# Patient Record
Sex: FEMALE | Race: WHITE | NOT HISPANIC OR LATINO | Employment: OTHER | URBAN - NONMETROPOLITAN AREA
[De-identification: names, ages, dates, MRNs, and addresses within clinical notes are randomized per-mention and may not be internally consistent; named-entity substitution may affect disease eponyms.]

---

## 2017-01-20 DIAGNOSIS — M25.512 LEFT SHOULDER PAIN, UNSPECIFIED CHRONICITY: Primary | ICD-10-CM

## 2017-01-23 ENCOUNTER — OFFICE VISIT (OUTPATIENT)
Dept: ORTHOPEDIC SURGERY | Facility: CLINIC | Age: 72
End: 2017-01-23

## 2017-01-23 ENCOUNTER — HOSPITAL ENCOUNTER (OUTPATIENT)
Dept: GENERAL RADIOLOGY | Facility: HOSPITAL | Age: 72
Discharge: HOME | End: 2017-01-23
Attending: ORTHOPAEDIC SURGERY | Admitting: ORTHOPAEDIC SURGERY

## 2017-01-23 VITALS
WEIGHT: 143 LBS | BODY MASS INDEX: 25.34 KG/M2 | HEIGHT: 63 IN | SYSTOLIC BLOOD PRESSURE: 122 MMHG | DIASTOLIC BLOOD PRESSURE: 66 MMHG | HEART RATE: 77 BPM

## 2017-01-23 DIAGNOSIS — G89.29 CHRONIC LEFT SHOULDER PAIN: Primary | ICD-10-CM

## 2017-01-23 DIAGNOSIS — M25.512 CHRONIC LEFT SHOULDER PAIN: Primary | ICD-10-CM

## 2017-01-23 DIAGNOSIS — M25.512 LEFT SHOULDER PAIN, UNSPECIFIED CHRONICITY: ICD-10-CM

## 2017-01-23 PROCEDURE — 73030 X-RAY EXAM OF SHOULDER: CPT | Performed by: RADIOLOGY

## 2017-01-23 PROCEDURE — 99203 OFFICE O/P NEW LOW 30 MIN: CPT | Performed by: ORTHOPAEDIC SURGERY

## 2017-01-23 PROCEDURE — 20610 DRAIN/INJ JOINT/BURSA W/O US: CPT | Performed by: ORTHOPAEDIC SURGERY

## 2017-01-23 PROCEDURE — 73030 X-RAY EXAM OF SHOULDER: CPT

## 2017-01-23 RX ORDER — ESOMEPRAZOLE MAGNESIUM 40 MG/1
CAPSULE, DELAYED RELEASE ORAL
Refills: 3 | COMMUNITY
Start: 2017-01-09 | End: 2017-02-16

## 2017-01-23 RX ORDER — BUPIVACAINE HYDROCHLORIDE 5 MG/ML
5 INJECTION, SOLUTION EPIDURAL; INTRACAUDAL
Status: COMPLETED | OUTPATIENT
Start: 2017-01-23 | End: 2017-01-23

## 2017-01-23 RX ORDER — METHYLPREDNISOLONE ACETATE 40 MG/ML
80 INJECTION, SUSPENSION INTRA-ARTICULAR; INTRALESIONAL; INTRAMUSCULAR; SOFT TISSUE
Status: COMPLETED | OUTPATIENT
Start: 2017-01-23 | End: 2017-01-23

## 2017-01-23 RX ORDER — ROSUVASTATIN CALCIUM 40 MG/1
40 TABLET, COATED ORAL NIGHTLY
Refills: 5 | COMMUNITY
Start: 2017-01-10 | End: 2021-02-04 | Stop reason: SDUPTHER

## 2017-01-23 RX ADMIN — METHYLPREDNISOLONE ACETATE 80 MG: 40 INJECTION, SUSPENSION INTRA-ARTICULAR; INTRALESIONAL; INTRAMUSCULAR; SOFT TISSUE at 09:31

## 2017-01-23 RX ADMIN — BUPIVACAINE HYDROCHLORIDE 5 ML: 5 INJECTION, SOLUTION EPIDURAL; INTRACAUDAL at 09:31

## 2017-01-23 NOTE — MR AVS SNAPSHOT
Sirisha Valdez   1/23/2017 8:00 AM   Office Visit    Dept Phone:  [phone]   Encounter #:  [id number]    Provider:  Tesfaye Polo MD   Department:  [facility]                Your Full Care Plan              Your Updated Medication List          This list is accurate as of: 1/23/17  8:55 AM.  Always use your most recent med list.                aspirin 81 MG EC tablet       cilostazol 50 MG tablet   Commonly known as:  PLETAL   Take 1 tablet by mouth 2 (Two) Times a Day.       diazePAM 10 MG tablet   Commonly known as:  VALIUM       esomeprazole 40 MG capsule   Commonly known as:  nexIUM       HYDROcodone-acetaminophen 7.5-325 MG per tablet   Commonly known as:  NORCO       lisinopril 2.5 MG tablet   Commonly known as:  PRINIVIL,ZESTRIL   Take 1 tablet by mouth Daily.       metoprolol tartrate 50 MG tablet   Commonly known as:  LOPRESSOR       * nicotine 21 MG/24HR patch   Commonly known as:  NICODERM CQ   Place 1 patch on the skin Daily.       * nicotine 14 MG/24HR patch   Commonly known as:  NICODERM CQ   Place 1 patch on the skin Daily.       * nicotine 7 MG/24HR patch   Commonly known as:  NICODERM CQ   Place 1 patch on the skin Daily.       pregabalin 75 MG capsule   Commonly known as:  LYRICA       * rosuvastatin 10 MG tablet   Commonly known as:  CRESTOR       * rosuvastatin 40 MG tablet   Commonly known as:  CRESTOR       * Notice:  This list has 5 medication(s) that are the same as other medications prescribed for you. Read the directions carefully, and ask your doctor or other care provider to review them with you.            Instructions     None    Patient Instructions History      Upcoming Appointments     Visit Type Date Time Department    NEW PATIENT 1/23/2017  8:00 AM [facility]    XR COR SHOULDER 2+ VW L 1/23/2017  8:15 AM [facility] XRAY [facility]    FOLLOW UP 3/6/2017  8:00 AM [facility]      MyChart Signup     Our  "records indicate that your [facility] [employer] account has been deactivated. If you would like to reactivate your account, please email [email or web address] or call [phone] to talk to our [employer] staff.             Other Info from Your Visit           Your Appointments     Mar 06, 2017  8:00 AM EST   Follow Up with Tesfaye Polo MD   [facility] (--)    [address] [place] [address] [place] [id number] [phone]           Arrive 15 minutes prior to appointment.              Allergies     Promethazine      hypotension      Reason for Visit     Left Shoulder - Pain     Shoulder Pain Patient presents today for left shoulder pain      Vital Signs     Blood Pressure Pulse Height Weight Body Mass Index Smoking Status    122/66 77 63\" (160 cm) 143 lb (64.9 kg) 25.33 kg/m2 Current Every Day Smoker        "

## 2017-01-23 NOTE — PROGRESS NOTES
"  Patient: Sirisha Valdez    YOB: 1945        History of Present Illness:   Patient is a 71-year-old white female who presents for evaluation of her left shoulder pain.  Right hand dominant  Relates it to a accident health fall in August of this year she fell out on her outstretched arm.  States it hurts all the time.  Hurts at night when she tries to lay on it.  Is unable to lift her arm overhead.  Has had history of a right rotator cuff repair done in the distal past.  Denies any paresthesias or swelling in her hand or wrist.  States that sometimes her shoulder swells and feels like a popping sensation in it.  Does take hydrocodone 4 times a day along with Lyrica and Valium for other pain problems prior to this injury    Positive smoking history  Review of Systems:    Pertinent positives evaluated and listed in HPI, others systems completed by patient and reviewed today.         Physical Exam: 71 y.o. female  General Appearance:    Alert and oriented x 3, cooperative, in no acute distress                   Vitals:    01/23/17 0853   BP: 122/66   Pulse: 77   Weight: 143 lb (64.9 kg)   Height: 63\" (160 cm)          Normal weight white female in no obvious acute distress.  Skin is intact.  No obvious bruising or ecchymosis or warmth noted.  She can forward flex to about 100° and abduct about the same with discomfort.  At her side she has full internal rotation and about 45° of external rotation without significant discomfort.  She has good internal/external rotation strength.  Does have pain and weakness with stressing the supraspinatus.  Left hand grossly neurovascularly intact without swelling.      Radiology:     X-rays are essentially normal without significant arthritis or evidence of acute injury      Large Joint Arthrocentesis  Date/Time: 1/23/2017 9:31 AM  Consent given by: patient  Site marked: site marked  Supporting Documentation  Indications: pain and diagnostic evaluation   Procedure " Details  Location: shoulder - L subacromial bursa  Needle size: 25 G  Approach: lateral  Medications administered: 80 mg methylPREDNISolone acetate 40 MG/ML; 5 mL bupivacaine (PF TEARTOP) 0.5 %  Patient tolerance: patient tolerated the procedure well with no immediate complications                Assessment/Plan:  Left shoulder pain likely secondary to rotator cuff injury.  We'll try one subacromial injection with some steroid and Marcaine and physical therapy to see how she responds.  We'll see her back in 5-6 weeks if no improvement we'll consider further evaluation with MRI        Discussion/Summary:    This chart was completed utilizing the dragon speech recognition software.  Grammatical errors, random word insertions, pronoun errors, and incomplete sentences or occasional consequences of the system due to software limitations, ambient noise, and hardware issues.  Any questions or concerns about the content, text, or information contained within the body of this dictation should be directly addressed to the physician for clarification        This document was signed by Tesfaye Polo M.D. January 23, 2017 9:27 AM

## 2017-02-16 ENCOUNTER — OFFICE VISIT (OUTPATIENT)
Dept: CARDIOLOGY | Facility: CLINIC | Age: 72
End: 2017-02-16

## 2017-02-16 VITALS
WEIGHT: 145 LBS | DIASTOLIC BLOOD PRESSURE: 70 MMHG | HEART RATE: 81 BPM | HEIGHT: 63 IN | BODY MASS INDEX: 25.69 KG/M2 | SYSTOLIC BLOOD PRESSURE: 118 MMHG

## 2017-02-16 DIAGNOSIS — I10 ESSENTIAL HYPERTENSION: ICD-10-CM

## 2017-02-16 DIAGNOSIS — I73.9 PVD (PERIPHERAL VASCULAR DISEASE) (HCC): Primary | ICD-10-CM

## 2017-02-16 DIAGNOSIS — I49.5 SSS (SICK SINUS SYNDROME) (HCC): ICD-10-CM

## 2017-02-16 DIAGNOSIS — R00.2 PALPITATIONS: ICD-10-CM

## 2017-02-16 DIAGNOSIS — I25.10 ARTERIOSCLEROTIC CARDIOVASCULAR DISEASE (ASCVD): ICD-10-CM

## 2017-02-16 PROCEDURE — 93000 ELECTROCARDIOGRAM COMPLETE: CPT | Performed by: INTERNAL MEDICINE

## 2017-02-16 PROCEDURE — 99213 OFFICE O/P EST LOW 20 MIN: CPT | Performed by: INTERNAL MEDICINE

## 2017-02-16 RX ORDER — BENZONATATE 200 MG/1
200 CAPSULE ORAL 3 TIMES DAILY PRN
COMMUNITY
End: 2017-12-11

## 2017-02-16 RX ORDER — CILOSTAZOL 50 MG/1
50 TABLET ORAL 2 TIMES DAILY
Qty: 60 TABLET | Refills: 6 | Status: SHIPPED | OUTPATIENT
Start: 2017-02-16 | End: 2018-03-01 | Stop reason: SDUPTHER

## 2017-02-16 NOTE — PROGRESS NOTES
Yasmine Reynolds MD  Sirisha Valdez  1945      Patient Active Problem List   Diagnosis   • Arteriosclerotic cardiovascular disease (ASCVD), s/p stenting in 1999, after a myocardial infarction.    • SSS (sick sinus syndrome), s/p permanent pacemaker implantion,    • Palpitations   • Dizziness   • Dyslipidemia, currently on statin   • Essential hypertension, well-contolled.    • PVD (peripheral vascular disease)   • Swelling of both lower extremities       Dear Yasmine Reynolds MD:    Tom Valdez is a 71 y.o. female with the above medical problems who is here today for follow-up.  He states she is doing well.  For some reason she has stopped taking her cilostazol which was apparently is helping her significantly with for claudication.  We will need to reordered today.  She also states she was unable to tolerate the nicotine patches due to blistering at the site of patch but she has cut down to half a pack per day.  She denies any chest pain, shortness breath, palpitations, orthopnea, PND or lower extremity edema.       Current Outpatient Prescriptions:   •  aspirin 81 MG EC tablet, Take 81 mg by mouth daily., Disp: , Rfl:   •  benzonatate (TESSALON) 200 MG capsule, Take 200 mg by mouth 3 (Three) Times a Day As Needed for cough., Disp: , Rfl:   •  diazepam (VALIUM) 10 MG tablet, Take 10 mg by mouth 2 (two) times a day., Disp: , Rfl:   •  HYDROcodone-acetaminophen (NORCO) 7.5-325 MG per tablet, Take 1 tablet by mouth every 4 (four) hours as needed for moderate pain (4-6)., Disp: , Rfl:   •  lisinopril (PRINIVIL,ZESTRIL) 2.5 MG tablet, Take 1 tablet by mouth Daily., Disp: 30 tablet, Rfl: 11  •  metoprolol tartrate (LOPRESSOR) 50 MG tablet, Take 50 mg by mouth 2 (two) times a day., Disp: , Rfl:   •  rosuvastatin (CRESTOR) 40 MG tablet, , Disp: , Rfl: 5  •  cilostazol (PLETAL) 50 MG tablet, Take 1 tablet by mouth 2 (Two) Times a Day., Disp: 60 tablet, Rfl: 6    The following portions of  "the patient's history were reviewed and updated as appropriate: allergies, current medications, past family history, past medical history, past social history, past surgical history and problem list.    Review of Systems   Constitution: Negative for chills, decreased appetite, fever and weakness.   HENT: Positive for sore throat. Negative for ear discharge and ear pain.    Eyes: Negative for pain and redness.   Cardiovascular: Positive for claudication. Negative for chest pain, leg swelling, orthopnea, palpitations, paroxysmal nocturnal dyspnea and syncope.   Respiratory: Negative for cough, shortness of breath and wheezing.    Endocrine: Negative for cold intolerance and heat intolerance.   Hematologic/Lymphatic: Bruises/bleeds easily.   Skin: Negative for dry skin, itching and rash.   Musculoskeletal: Negative for arthritis, back pain, joint pain, neck pain and stiffness.   Gastrointestinal: Negative for abdominal pain, diarrhea, nausea and vomiting.   Genitourinary: Negative for dysuria and hematuria.   Neurological: Negative for dizziness, light-headedness and seizures.   Psychiatric/Behavioral: Negative for depression. The patient is not nervous/anxious.        Objective   Blood pressure 118/70, pulse 81, height 63\" (160 cm), weight 145 lb (65.8 kg).    Physical Exam   Constitutional: She is oriented to person, place, and time. She appears well-developed and well-nourished.   White female sitting comfortably on chair.   HENT:   Mouth/Throat: Oropharynx is clear and moist.   Eyes: EOM are normal. Pupils are equal, round, and reactive to light.   Neck: Neck supple. No JVD present. No tracheal deviation present. No thyromegaly present.   Cardiovascular: Normal rate, regular rhythm, S1 normal and S2 normal.  Exam reveals no gallop and no friction rub.    No murmur heard.  Pulmonary/Chest: Effort normal and breath sounds normal. No respiratory distress. She has no wheezes. She has no rales. She exhibits no " tenderness.   Abdominal: Soft. Bowel sounds are normal. She exhibits no mass. There is no tenderness.   Musculoskeletal: Normal range of motion. She exhibits no edema.   Lymphadenopathy:     She has no cervical adenopathy.   Neurological: She is alert and oriented to person, place, and time.   Skin: Skin is warm and dry. No rash noted.   Psychiatric: She has a normal mood and affect.         ECG 12 Lead  Date/Time: 2/16/2017 11:32 AM  Performed by: PAM ROY  Authorized by: PAM ROY   Comparison: compared with previous ECG from 10/13/2016  Similar to previous ECG  Rhythm: sinus rhythm  Rate: normal  BPM: 80  Conduction: conduction normal  ST Segments: ST segments normal  T depression: II, III and aVF  QRS axis: left and normal  Q waves: II, III and aVF  Clinical impression: non-specific ECG and low voltage            Assessment/Plan     1.  Peripheral vascular disease: Patient with history of peripheral vascular disease status post femorofemoral bypass as well as bilateral lower extremity sent placement.  venous ultrasound of lower extremity did not show any evidence of DVT.  ABIs done during last visit showed decreased circulation to right lower extremity with triphasic flow.  He was reportedly doing well on cilostazol but the patient has discontinued this medication for an unclear reason.  At this point will reorder.  I have also advised her of the importance of tobacco cessation.    2.  Coronary artery disease: Patient with history of coronary artery disease currently on aspirin,beta blocker and statin.  At this point we'll continue current regimen.    3.  Palpitations: Patient with history of palpitations which are currently resolved.  Will continue to monitor.      4.  Sick sinus syndrome: Patient with history of sick sinus syndrome status post pacemaker placement.  She currently remains in sinus rhythm.    5.  Hypertension: Patient is a history of hypertension  currently well controlled on current regimen.  Need to monitor closely a statin losartan.  Will obtain CMP.    6.  Dyslipidemia: Patient with history of dyslipidemia.  Lipid panel showed adequate lipid control.  At this point we'll continue to monitor.    7. Tobacco abuse: Sensation was unable to use patches due to blistering at the site of the patch.  She is going to try nicotine gum.  He has cut down to half a pack per day.  Class her the importance of tobacco cessation.     Diagnosis Plan   1. PVD (peripheral vascular disease)     2. Arteriosclerotic cardiovascular disease (ASCVD), s/p stenting in 1999, after a myocardial infarction.      3. SSS (sick sinus syndrome), s/p permanent pacemaker implantion,      4. Palpitations     5. Essential hypertension, well-contolled.             Return in about 3 months (around 5/16/2017).    I appreciate the opportunity to participate in this patient's cardiovascular care.    Best Regards    Jose F Be

## 2017-02-20 ENCOUNTER — OFFICE VISIT (OUTPATIENT)
Dept: ORTHOPEDIC SURGERY | Facility: CLINIC | Age: 72
End: 2017-02-20

## 2017-02-20 ENCOUNTER — TELEPHONE (OUTPATIENT)
Dept: CARDIOLOGY | Facility: CLINIC | Age: 72
End: 2017-02-20

## 2017-02-20 VITALS — HEIGHT: 63 IN | WEIGHT: 145 LBS | BODY MASS INDEX: 25.69 KG/M2

## 2017-02-20 DIAGNOSIS — G89.29 CHRONIC LEFT SHOULDER PAIN: Primary | ICD-10-CM

## 2017-02-20 DIAGNOSIS — M25.512 CHRONIC LEFT SHOULDER PAIN: Primary | ICD-10-CM

## 2017-02-20 PROCEDURE — 99214 OFFICE O/P EST MOD 30 MIN: CPT | Performed by: ORTHOPAEDIC SURGERY

## 2017-02-20 NOTE — PROGRESS NOTES
"Patient: Sirisha Valdez    YOB: 1945        History of Present Illness:     Patient presents back after the subacromial injection.  States it helped for approximately 1 week.  Continues with chronic complaint of pain weakness popping and catching in the shoulder.  She relates this to a fall in August approximate 6 months ago.  Went to a couple sessions of physical therapy but she said it just, aggravated and made it worse.  Denies any peripheral paresthesias or swelling.  States she had her right shoulder rotator cuff repair done in the past.  Unable to do an MRI secondary to pacemaker.  States she has been told not to take any type of dye    Positive smoking history      Physical Exam: 71 y.o. female  General Appearance:    Alert and oriented x 3, cooperative, in no acute distress                   Vitals:    02/20/17 0951   Weight: 145 lb (65.8 kg)   Height: 63\" (160 cm)          Exam is essentially unchanged.  Notable weakness with stressing the supraspinatus.  Can forward flex to about 80° abduct about the same.  40° of external rotation or side.  Has internal rotation about the SI joint      Radiology:       Previous x-rays performed on January 23 are unremarkable      Assessment/Plan:  Chronic left shoulder pain and weakness status post fall approximately 6 months ago.  Discussed situation with the patient.  Unable to do MRI secondary to pacemaker.  At this point we discussed proceeding with a diagnostic arthroscopy, acromioplasty and probable rotator cuff repair.  We would plan do this as an outpatient.  She would need medical clearance including her cardiologist.  We discussed the detrimental effects of her age and smoking history.  Discussed the risk including infection, bleeding, prolonged rehabilitation, failure to relieve all her symptoms, failure of healing, local nerve damage etc.        Discussion/Summary:        This chart was completed utilizing the dragon speech recognition " software.  Grammatical errors, random word insertions, pronoun errors, and incomplete sentences or occasional consequences of the system due to software limitations, ambient noise, and hardware issues.  Any questions or concerns about the content, text, or information contained within the body of this dictation should be directly addressed to the physician for clarification        This document was signed by Tesfaye Polo M.D. February 20, 2017 10:01 AM

## 2017-02-20 NOTE — H&P
History and Physical      Patient: Sirisha Valdez  YOB: 1945  Date of Encounter: 02/20/2017      HPI:    Patient is a 71-year-old white female who presents for follow up evaluation after subacromial injection. This helped 1 week in regards to her left shoulder pain. Right hand dominant . She relates this to a fall in August approximate 6 months ago. Went to a couple sessions of physical therapy but she said it just, aggravated and made it worse. Denies any peripheral paresthesias or swelling. States she had her right shoulder rotator cuff repair done in the past. Unable to do an MRI secondary to pacemaker. States she has been told not to take any type of dye Does take hydrocodone 4 times a day along with Lyrica and Valium for other pain problems prior to this injury      Past Medical History   Diagnosis Date   • ACD (adult celiac disease)    • Gastroesophageal reflux    • Peripheral vascular disease    • SSS (sick sinus syndrome)    • Status post placement of cardiac pacemaker        Past Surgical History   Procedure Laterality Date   • Shoulder surgery     • Hip surgery     • Carpal tunnel release     • Breast surgery       Mass Removal.   • Hysterectomy     • Pacemaker implantation     • Cataract extraction     • Cardiac catheterization         Family History   Problem Relation Age of Onset   • Heart attack Father    • Heart disease Father    • Heart attack Brother        Social History     Social History   • Marital status:      Spouse name: N/A   • Number of children: N/A   • Years of education: N/A     Occupational History   • Not on file.     Social History Main Topics   • Smoking status: Current Every Day Smoker     Packs/day: 1.00     Types: Cigarettes     Start date: 1961   • Smokeless tobacco: Never Used   • Alcohol use No   • Drug use: No   • Sexual activity: Not on file     Other Topics Concern   • Not on file     Social History Narrative       Current Outpatient Prescriptions  "  Medication Sig Dispense Refill   • aspirin 81 MG EC tablet Take 81 mg by mouth daily.     • benzonatate (TESSALON) 200 MG capsule Take 200 mg by mouth 3 (Three) Times a Day As Needed for cough.     • cilostazol (PLETAL) 50 MG tablet Take 1 tablet by mouth 2 (Two) Times a Day. 60 tablet 6   • diazepam (VALIUM) 10 MG tablet Take 10 mg by mouth 2 (two) times a day.     • HYDROcodone-acetaminophen (NORCO) 7.5-325 MG per tablet Take 1 tablet by mouth every 4 (four) hours as needed for moderate pain (4-6).     • lisinopril (PRINIVIL,ZESTRIL) 2.5 MG tablet Take 1 tablet by mouth Daily. 30 tablet 11   • metoprolol tartrate (LOPRESSOR) 50 MG tablet Take 50 mg by mouth 2 (two) times a day.     • rosuvastatin (CRESTOR) 40 MG tablet   5     No current facility-administered medications for this visit.        Allergies   Allergen Reactions   • Promethazine      hypotension       Review of Systems   Constitution: Negative.   HENT: Negative.    Eyes: Negative.    Cardiovascular: Negative.    Respiratory: Negative.    Endocrine: Negative.    Hematologic/Lymphatic: Negative.    Skin: Negative.    Musculoskeletal:        Pertinent positives listed in HPI   Gastrointestinal: Negative.    Genitourinary: Negative.    Neurological: Negative.    Psychiatric/Behavioral: Negative.    Allergic/Immunologic: Negative.          Vitals:    02/20/17 0951   Weight: 145 lb (65.8 kg)   Height: 63\" (160 cm)             Physical Exam   Constitutional: She is oriented to person, place, and time. She appears well-developed and well-nourished. No distress.   HENT:   Head: Normocephalic and atraumatic.   Nose: Nose normal.   Mouth/Throat: Oropharynx is clear and moist.   Eyes: Conjunctivae and EOM are normal. Pupils are equal, round, and reactive to light. No scleral icterus.   Neck: Normal range of motion. Neck supple. No JVD present. No tracheal deviation present. No thyromegaly present.   Cardiovascular: Normal rate, regular rhythm, normal heart " sounds and intact distal pulses.  Exam reveals no gallop and no friction rub.    No murmur heard.  Pulmonary/Chest: Effort normal. No respiratory distress. She has wheezes. She has no rales. She exhibits no tenderness.   Abdominal: Soft. Bowel sounds are normal.   Musculoskeletal:   Left Shoulder exam reveals no obvious bruising or ecchymosis or warmth noted. Notable weakness with stressing the supraspinatus. Can forward flex to about 80° abduct about the same. 40° of external rotation or side. Has internal rotation about the SI joint. Does have pain and weakness with stressing the supraspinatus. Left hand grossly neurovascularly intact without swelling.   Lymphadenopathy:     She has no cervical adenopathy.   Neurological: She is alert and oriented to person, place, and time.   Skin: Skin is warm and dry. No erythema.   Psychiatric: She has a normal mood and affect.   Nursing note and vitals reviewed.      Radiology/Labs:          Previous x-rays performed on January 23 are unremarkable    Procedures          Assessment:    ICD-10-CM ICD-9-CM   1. Chronic left shoulder pain M25.512 719.41    G89.29 338.29         Plan:    Chronic left shoulder pain and weakness status post fall approximately 6 months ago. Discussed situation with the patient. Unable to do MRI secondary to pacemaker. At this point we discussed proceeding with a diagnostic arthroscopy, acromioplasty and probable rotator cuff repair. We would plan do this as an outpatient. She would need medical clearance including her cardiologist. We discussed the detrimental effects of her age and smoking history. Discussed the risk including infection, bleeding, prolonged rehabilitation, failure to relieve all her symptoms, failure of healing, local nerve damage etc.           Discussion/Summary:           This chart was completed utilizing the dragon speech recognition software. Grammatical errors, random word insertions, pronoun errors, and incomplete sentences  or occasional consequences of the system due to software limitations, ambient noise, and hardware issues. Any questions or concerns about the content, text, or information contained within the body of this dictation should be directly addressed to the physician for clarification      This document was signed by Tesfaye Polo M.D. February 20, 2017 10:21 AM

## 2017-02-20 NOTE — TELEPHONE ENCOUNTER
----- Message from Monica Martinez sent at 2/20/2017 12:47 PM EST -----  Regarding: surgery clearance  Contact: [phone]  Dr. Polo is requesting Surgery clearance for a left shoulder surgery, we just seen her on the 16th do we need to see her back or can we clear her?

## 2017-03-01 ENCOUNTER — TELEPHONE (OUTPATIENT)
Dept: ORTHOPEDIC SURGERY | Facility: CLINIC | Age: 72
End: 2017-03-01

## 2017-03-01 NOTE — TELEPHONE ENCOUNTER
Patient called, she had gone to PCP Dr. Reynolds on Monday 02/27/17 for medical clearance, she ask if we had received it as yet. I told her no, but we have received a clearance from her cardiologist Dr. Be. She said to call her once we receive it.     Phone number [phone]

## 2017-03-07 ENCOUNTER — APPOINTMENT (OUTPATIENT)
Dept: PREADMISSION TESTING | Facility: HOSPITAL | Age: 72
End: 2017-03-07

## 2017-03-08 ENCOUNTER — APPOINTMENT (OUTPATIENT)
Dept: GENERAL RADIOLOGY | Facility: HOSPITAL | Age: 72
End: 2017-03-08
Attending: ORTHOPAEDIC SURGERY

## 2017-03-08 ENCOUNTER — ANESTHESIA EVENT (OUTPATIENT)
Dept: PERIOP | Facility: HOSPITAL | Age: 72
End: 2017-03-08

## 2017-03-08 ENCOUNTER — HOSPITAL ENCOUNTER (OUTPATIENT)
Facility: HOSPITAL | Age: 72
Discharge: HOME | End: 2017-03-08
Attending: ORTHOPAEDIC SURGERY | Admitting: ORTHOPAEDIC SURGERY

## 2017-03-08 ENCOUNTER — ANESTHESIA (OUTPATIENT)
Dept: PERIOP | Facility: HOSPITAL | Age: 72
End: 2017-03-08

## 2017-03-08 VITALS
BODY MASS INDEX: 25.69 KG/M2 | RESPIRATION RATE: 18 BRPM | WEIGHT: 145 LBS | HEIGHT: 63 IN | SYSTOLIC BLOOD PRESSURE: 125 MMHG | TEMPERATURE: 97.3 F | HEART RATE: 87 BPM | DIASTOLIC BLOOD PRESSURE: 69 MMHG | OXYGEN SATURATION: 95 %

## 2017-03-08 DIAGNOSIS — G89.29 CHRONIC LEFT SHOULDER PAIN: ICD-10-CM

## 2017-03-08 DIAGNOSIS — M25.512 CHRONIC LEFT SHOULDER PAIN: ICD-10-CM

## 2017-03-08 PROCEDURE — 25010000002 DEXAMETHASONE PER 1 MG: Performed by: NURSE ANESTHETIST, CERTIFIED REGISTERED

## 2017-03-08 PROCEDURE — 29822 SHO ARTHRS SRG LMTD DBRDMT: CPT | Performed by: ORTHOPAEDIC SURGERY

## 2017-03-08 PROCEDURE — 29826 SHO ARTHRS SRG DECOMPRESSION: CPT | Performed by: ORTHOPAEDIC SURGERY

## 2017-03-08 PROCEDURE — 25010000002 MIDAZOLAM PER 1 MG: Performed by: ANESTHESIOLOGY

## 2017-03-08 PROCEDURE — 25010000003 CEFAZOLIN PER 500 MG: Performed by: ORTHOPAEDIC SURGERY

## 2017-03-08 PROCEDURE — 25010000002 PROPOFOL 10 MG/ML EMULSION: Performed by: NURSE ANESTHETIST, CERTIFIED REGISTERED

## 2017-03-08 PROCEDURE — 25010000002 FENTANYL CITRATE (PF) 100 MCG/2ML SOLUTION: Performed by: ANESTHESIOLOGY

## 2017-03-08 PROCEDURE — 25010000002 ONDANSETRON PER 1 MG: Performed by: NURSE ANESTHETIST, CERTIFIED REGISTERED

## 2017-03-08 PROCEDURE — 25010000002 NEOSTIGMINE 10 MG/10ML SOLUTION: Performed by: NURSE ANESTHETIST, CERTIFIED REGISTERED

## 2017-03-08 RX ORDER — FENTANYL CITRATE 50 UG/ML
INJECTION, SOLUTION INTRAMUSCULAR; INTRAVENOUS AS NEEDED
Status: DISCONTINUED | OUTPATIENT
Start: 2017-03-08 | End: 2017-03-08 | Stop reason: SURG

## 2017-03-08 RX ORDER — SODIUM CHLORIDE, SODIUM LACTATE, POTASSIUM CHLORIDE, CALCIUM CHLORIDE 600; 310; 30; 20 MG/100ML; MG/100ML; MG/100ML; MG/100ML
125 INJECTION, SOLUTION INTRAVENOUS CONTINUOUS
Status: DISCONTINUED | OUTPATIENT
Start: 2017-03-08 | End: 2017-03-08 | Stop reason: HOSPADM

## 2017-03-08 RX ORDER — HYDROCODONE BITARTRATE AND ACETAMINOPHEN 7.5; 325 MG/1; MG/1
1 TABLET ORAL ONCE AS NEEDED
Status: DISCONTINUED | OUTPATIENT
Start: 2017-03-08 | End: 2017-03-08 | Stop reason: HOSPADM

## 2017-03-08 RX ORDER — OXYCODONE HYDROCHLORIDE AND ACETAMINOPHEN 5; 325 MG/1; MG/1
1 TABLET ORAL ONCE AS NEEDED
Status: DISCONTINUED | OUTPATIENT
Start: 2017-03-08 | End: 2017-03-08 | Stop reason: HOSPADM

## 2017-03-08 RX ORDER — FENTANYL CITRATE 50 UG/ML
50 INJECTION, SOLUTION INTRAMUSCULAR; INTRAVENOUS
Status: DISCONTINUED | OUTPATIENT
Start: 2017-03-08 | End: 2017-03-08 | Stop reason: HOSPADM

## 2017-03-08 RX ORDER — ETOMIDATE 2 MG/ML
INJECTION INTRAVENOUS AS NEEDED
Status: DISCONTINUED | OUTPATIENT
Start: 2017-03-08 | End: 2017-03-08 | Stop reason: SURG

## 2017-03-08 RX ORDER — ONDANSETRON 2 MG/ML
4 INJECTION INTRAMUSCULAR; INTRAVENOUS ONCE AS NEEDED
Status: DISCONTINUED | OUTPATIENT
Start: 2017-03-08 | End: 2017-03-08 | Stop reason: HOSPADM

## 2017-03-08 RX ORDER — LIDOCAINE HYDROCHLORIDE 20 MG/ML
INJECTION, SOLUTION INFILTRATION; PERINEURAL AS NEEDED
Status: DISCONTINUED | OUTPATIENT
Start: 2017-03-08 | End: 2017-03-08 | Stop reason: SURG

## 2017-03-08 RX ORDER — ROCURONIUM BROMIDE 10 MG/ML
INJECTION, SOLUTION INTRAVENOUS AS NEEDED
Status: DISCONTINUED | OUTPATIENT
Start: 2017-03-08 | End: 2017-03-08 | Stop reason: SURG

## 2017-03-08 RX ORDER — GLYCOPYRROLATE 0.2 MG/ML
INJECTION INTRAMUSCULAR; INTRAVENOUS AS NEEDED
Status: DISCONTINUED | OUTPATIENT
Start: 2017-03-08 | End: 2017-03-08 | Stop reason: SURG

## 2017-03-08 RX ORDER — MIDAZOLAM HYDROCHLORIDE 1 MG/ML
INJECTION INTRAMUSCULAR; INTRAVENOUS AS NEEDED
Status: DISCONTINUED | OUTPATIENT
Start: 2017-03-08 | End: 2017-03-08 | Stop reason: SURG

## 2017-03-08 RX ORDER — ESMOLOL HYDROCHLORIDE 10 MG/ML
INJECTION INTRAVENOUS AS NEEDED
Status: DISCONTINUED | OUTPATIENT
Start: 2017-03-08 | End: 2017-03-08 | Stop reason: SURG

## 2017-03-08 RX ORDER — PROPOFOL 10 MG/ML
VIAL (ML) INTRAVENOUS AS NEEDED
Status: DISCONTINUED | OUTPATIENT
Start: 2017-03-08 | End: 2017-03-08 | Stop reason: SURG

## 2017-03-08 RX ORDER — KETOROLAC TROMETHAMINE 30 MG/ML
INJECTION, SOLUTION INTRAMUSCULAR; INTRAVENOUS AS NEEDED
Status: DISCONTINUED | OUTPATIENT
Start: 2017-03-08 | End: 2017-03-08

## 2017-03-08 RX ORDER — SODIUM CHLORIDE 0.9 % (FLUSH) 0.9 %
1-10 SYRINGE (ML) INJECTION AS NEEDED
Status: DISCONTINUED | OUTPATIENT
Start: 2017-03-08 | End: 2017-03-08 | Stop reason: HOSPADM

## 2017-03-08 RX ORDER — DEXAMETHASONE SODIUM PHOSPHATE 4 MG/ML
INJECTION, SOLUTION INTRA-ARTICULAR; INTRALESIONAL; INTRAMUSCULAR; INTRAVENOUS; SOFT TISSUE AS NEEDED
Status: DISCONTINUED | OUTPATIENT
Start: 2017-03-08 | End: 2017-03-08 | Stop reason: SURG

## 2017-03-08 RX ORDER — MEPERIDINE HYDROCHLORIDE 25 MG/ML
12.5 INJECTION INTRAMUSCULAR; INTRAVENOUS; SUBCUTANEOUS
Status: DISCONTINUED | OUTPATIENT
Start: 2017-03-08 | End: 2017-03-08 | Stop reason: HOSPADM

## 2017-03-08 RX ORDER — NEOSTIGMINE METHYLSULFATE 1 MG/ML
INJECTION, SOLUTION INTRAVENOUS AS NEEDED
Status: DISCONTINUED | OUTPATIENT
Start: 2017-03-08 | End: 2017-03-08 | Stop reason: SURG

## 2017-03-08 RX ORDER — IPRATROPIUM BROMIDE AND ALBUTEROL SULFATE 2.5; .5 MG/3ML; MG/3ML
3 SOLUTION RESPIRATORY (INHALATION) ONCE AS NEEDED
Status: DISCONTINUED | OUTPATIENT
Start: 2017-03-08 | End: 2017-03-08 | Stop reason: HOSPADM

## 2017-03-08 RX ORDER — HYDROCODONE BITARTRATE AND ACETAMINOPHEN 7.5; 325 MG/1; MG/1
1-2 TABLET ORAL EVERY 4 HOURS PRN
Qty: 24 TABLET | Refills: 0 | Status: SHIPPED | OUTPATIENT
Start: 2017-03-08 | End: 2018-01-10 | Stop reason: SDUPTHER

## 2017-03-08 RX ORDER — MAGNESIUM HYDROXIDE 1200 MG/15ML
LIQUID ORAL AS NEEDED
Status: DISCONTINUED | OUTPATIENT
Start: 2017-03-08 | End: 2017-03-08 | Stop reason: HOSPADM

## 2017-03-08 RX ORDER — ONDANSETRON 2 MG/ML
INJECTION INTRAMUSCULAR; INTRAVENOUS AS NEEDED
Status: DISCONTINUED | OUTPATIENT
Start: 2017-03-08 | End: 2017-03-08 | Stop reason: SURG

## 2017-03-08 RX ADMIN — ETOMIDATE 10 MG: 2 INJECTION, SOLUTION INTRAVENOUS at 10:22

## 2017-03-08 RX ADMIN — FENTANYL CITRATE 25 MCG: 50 INJECTION INTRAMUSCULAR; INTRAVENOUS at 11:25

## 2017-03-08 RX ADMIN — NEOSTIGMINE METHYLSULFATE 4 MG: 1 INJECTION, SOLUTION INTRAVENOUS at 11:20

## 2017-03-08 RX ADMIN — DEXAMETHASONE SODIUM PHOSPHATE 4 MG: 4 INJECTION, SOLUTION INTRAMUSCULAR; INTRAVENOUS at 11:20

## 2017-03-08 RX ADMIN — FENTANYL CITRATE 25 MCG: 50 INJECTION INTRAMUSCULAR; INTRAVENOUS at 11:32

## 2017-03-08 RX ADMIN — ONDANSETRON 4 MG: 2 INJECTION, SOLUTION INTRAMUSCULAR; INTRAVENOUS at 11:20

## 2017-03-08 RX ADMIN — FENTANYL CITRATE 50 MCG: 50 INJECTION INTRAMUSCULAR; INTRAVENOUS at 10:45

## 2017-03-08 RX ADMIN — CEFAZOLIN SODIUM 2 G: 2 SOLUTION INTRAVENOUS at 10:28

## 2017-03-08 RX ADMIN — FENTANYL CITRATE 25 MCG: 50 INJECTION INTRAMUSCULAR; INTRAVENOUS at 11:05

## 2017-03-08 RX ADMIN — PROPOFOL 30 MG: 10 INJECTION, EMULSION INTRAVENOUS at 10:22

## 2017-03-08 RX ADMIN — ESMOLOL HYDROCHLORIDE 20 MG: 10 INJECTION, SOLUTION INTRAVENOUS at 11:40

## 2017-03-08 RX ADMIN — FENTANYL CITRATE 100 MCG: 50 INJECTION INTRAMUSCULAR; INTRAVENOUS at 10:02

## 2017-03-08 RX ADMIN — FENTANYL CITRATE 100 MCG: 50 INJECTION INTRAMUSCULAR; INTRAVENOUS at 10:22

## 2017-03-08 RX ADMIN — LIDOCAINE HYDROCHLORIDE 100 MG: 20 INJECTION, SOLUTION INFILTRATION; PERINEURAL at 10:22

## 2017-03-08 RX ADMIN — GLYCOPYRROLATE 0.6 MG: 0.2 INJECTION, SOLUTION INTRAMUSCULAR; INTRAVENOUS at 11:20

## 2017-03-08 RX ADMIN — MIDAZOLAM HYDROCHLORIDE 2 MG: 1 INJECTION, SOLUTION INTRAMUSCULAR; INTRAVENOUS at 10:11

## 2017-03-08 RX ADMIN — SODIUM CHLORIDE, POTASSIUM CHLORIDE, SODIUM LACTATE AND CALCIUM CHLORIDE: 600; 310; 30; 20 INJECTION, SOLUTION INTRAVENOUS at 10:14

## 2017-03-08 RX ADMIN — ROCURONIUM BROMIDE 40 MG: 10 INJECTION INTRAVENOUS at 10:23

## 2017-03-08 RX ADMIN — FENTANYL CITRATE 25 MCG: 50 INJECTION INTRAMUSCULAR; INTRAVENOUS at 11:44

## 2017-03-08 NOTE — ANESTHESIA PROCEDURE NOTES
Peripheral Block    Start time: 3/8/2017 10:00 AM  Stop time: 3/8/2017 10:08 AM  Reason for block: post-op pain management  Performed by  Anesthesiologist: ANITHA ROSS  Preanesthetic Checklist  Completed: patient identified, site marked, surgical consent, pre-op evaluation, timeout performed, IV checked, risks and benefits discussed and monitors and equipment checked  Peripheral Block Prep:  Sterile barriers:gloves  Prep: Betadine  Patient monitoring: continuous pulse oximetry  Peripheral Procedure  Sedation:yes  Guidance:nerve stimulator  Loss of twitch: 1 mA  Laterality:left  Block Type:interscalene  Injection Technique:single-shot  Needle Type:Quincke  Needle Gauge:22 G    Medications  Local Injected:ropivacaine 0.5% Local Amount Injected:30mL  Post Assessment  Injection Assessment: negative aspiration for heme  Patient Tolerance:comfortable throughout block  Complications:no

## 2017-03-08 NOTE — OP NOTE
DATE OF PROCEDURE: 03/08/2017    PREOPERATIVE DIAGNOSES:   1. Chronic left shoulder pain.   2. Impingement.    POSTOPERATIVE DIAGNOSES:  1. Chronic left shoulder pain.   2. Impingement.  3. Glenohumeral joint arthritis.     PROCEDURES PERFORMED:   1. Left shoulder arthroscopy, acromioplasty and bursectomy.   2. Debridement of partial bursal-sided tear.   3. Chondroplasty of glenohumeral joint.     SURGEON: Tesfaye Polo MD     ASSISTANT: Jose Hughes CSA     INDICATIONS: Briefly, this is a 71-year-old white female who presents with chronic left shoulder pain, evidence of impingement, occasional catching and popping. She has been recalcitrant to conservative treatment, including several injections and physical therapy. Because of a pacemaker, she is unable to do an MRI, so the option of a diagnostic arthroscopy, acromioplasty, possible rotator cuff repair was discussed with her. The risks and benefits were discussed, including infection, bleeding, failure to relieve all of her symptoms, need for future surgery, failure of healing, risks of anesthesia. She was seen by her cardiologist prior to the surgery. Detrimental effects of smoking were discussed with her on her healing.     DESCRIPTION OF PROCEDURE: Briefly, the patient underwent a scalene block by Anesthesia in the preop holding area. She was then brought to the operating room and underwent general anesthetic. She was placed in the beach chair position, well positioned and well padded. Her left arm was prepped with ChloraPrep and draped in a sterile fashion. The joint was insufflated from a posterior portal with a spinal needle. A #11 blade was then used to make a posterior portal. A blunt trocar was used to introduce a 30° scope into the joint. Examination of the glenohumeral joint showed she had large areas of grade 2 and early grade 3 degenerative changes on the humeral head and changes on the glenoid. There was some mild synovitis within the joint.  The biceps tendon and labrum looked fine. The subscapularis looked fine. There were no through-and-through tears on the undersurface of the rotator cuff on its insertion in the footprint. There was evidence of some degeneration and fraying of a small area of the insertion.     An anterior portal was then made. We did a gentle synovectomy with a 4.0 full-radius shaver and also gently debrided the undersurface of the rotator cuff insertion with a 4.0 full-radius resector. Using a slotted whisker, we did gentle chondroplasty, especially at the edges of these changes. We took out some of the loose fronds of articular cartilage back to what appeared to be a stable rim on both the glenoid side and the humeral side.     The scope was then placed in the subacromial space. Here, a lateral portal was then made and using a _____ wand we performed a bursectomy. The superior surface of the rotator cuff showed some evidence of degeneration and slight partial tearing, but there were no obvious through-and-through tears that we could identify. The _____ wand was used to gently debride the surface of the rotator cuff of the loose, again, kind of the frayed portion of the cuff itself. Examination again with a nerve hook showed no obvious through-and-through tears here. We then performed an acromioplasty and took out the coracoacromial ligament anteriorly using the 4.0 full-radius shaver and a bur. With completion of this, the patient had good range of motion without any significant evidence of impingement, especially kind of anterior lateral where it did look like where most of the fraying of the rotator cuff was occurring. The wounds were then closed with 3-0 nylon simple sutures. The incisions were infiltrated with 0.5% Marcaine with epinephrine along with the subacromial space. Betadine-soaked Adaptic and sterile dressing were placed, and the patient was placed in a simple sling and an ice pack. She tolerated the procedure well,  was transferred to the recovery room in stable condition.     D: JUAN M 03/08/2017 12:36:41 ET  T: oswaldo / 03/08/2017 16:14:04 ET  Revision Count: 0  Job ID: [id number]  [id number]  cc:      Dictator Signature:___________________________     Tesfaye Polo MD

## 2017-03-08 NOTE — BRIEF OP NOTE
SHOULDER ARTHROSCOPY WITH ROTATOR CUFF REPAIR  Procedure Note    Sirisha Valdez  3/8/2017    Pre-op Diagnosis:   Chronic left shoulder pain [M25.512, G89.29]    Post-op Diagnosis:     Post-Op Diagnosis Codes:     * Chronic left shoulder pain [M25.512, G89.29]    Procedure/CPT® Codes:      Procedure(s):  LEFT SHOULDER ARTHROSCOPY, ACROMIOPLASTY, SUBACROMIC DEBRIDMENT    Surgeon(s):  Tesfaye Polo MD    Anesthesia: General    Staff:   Circulator: Ras Mccrary RN  Scrub Person: Rosaline Montano; Lor Yousif  Assistant: Jose Hughes CSA    Estimated Blood Loss: * No values recorded between 3/8/2017 10:14 AM and 3/8/2017 11:25 AM *  Urine Voided: * No values recorded between 3/8/2017 10:14 AM and 3/8/2017 11:25 AM *    Specimens:                * No specimens in log *      Drains:           Findings: See full op note  Complications: None      Tesfaye Polo MD     Date: 3/8/2017  Time: 11:25 AM

## 2017-03-08 NOTE — ANESTHESIA PROCEDURE NOTES
Airway  Urgency: elective    Airway not difficult    General Information and Staff    Patient location during procedure: OR  CRNA: GLYNN MANDUJANO    Indications and Patient Condition  Indications for airway management: airway protection    Preoxygenated: yes  MILS maintained throughout  Mask difficulty assessment: 1 - vent by mask    Final Airway Details  Final airway type: endotracheal airway      Successful airway: ETT    Successful intubation technique: direct laryngoscopy  Facilitating devices/methods: intubating stylet  Blade: Eddie  Blade size: #3  ETT size: 7.5 mm  Cormack-Lehane Classification: grade I - full view of glottis  Placement verified by: chest auscultation and capnometry   Measured from: lips  ETT to lips (cm): 21  Number of attempts at approach: 1    Additional Comments  ETT inserted without difficulty.  Head and neck maintained midline.  Lips and teeth as per preop.

## 2017-03-08 NOTE — ANESTHESIA PREPROCEDURE EVALUATION
Anesthesia Evaluation     NPO Status: > 8 hours   Airway   Mallampati: II  TM distance: >3 FB  Neck ROM: full  no difficulty expected  Dental    (+) edentulous    Pulmonary - normal exam   Cardiovascular - normal exam  Exercise tolerance: good (4-7 METS)    NYHA Classification: II  PT is on anticoagulation therapy  Patient on routine beta blocker and Beta blocker given within 24 hours of surgery    (+) hypertension, past MI  >12 months, CAD, PVD,       Neuro/Psych  (+) dizziness/light headedness,    GI/Hepatic/Renal/Endo    (+)  GERD,     Musculoskeletal     Abdominal  - normal exam   Substance History      OB/GYN          Other                                    Anesthesia Plan    ASA 3     general     intravenous induction   Anesthetic plan and risks discussed with patient.

## 2017-03-08 NOTE — DISCHARGE INSTRUCTIONS
In the shoulder acromioplasty surgery, a small piece of the surface of the acromion that is causing damage to the tendon tissue is removed. Arthroscopic acromioplasty is used to treat severe cases of impingement syndrome, a condition resulting from an injury to the rotator cuff muscles and often seen in aging adults

## 2017-03-08 NOTE — ANESTHESIA POSTPROCEDURE EVALUATION
Patient: Sirisha STEVENSON Valdez    Procedure Summary     Date Anesthesia Start Anesthesia Stop Room / Location    03/08/17 1014 1145  [facility] 03 / [facility]       Procedure Diagnosis Surgeon Provider    LEFT SHOULDER ARTHROSCOPY, ACROMIOPLASTY, SUBACROMIC DEBRIDMENT (Left Shoulder) Chronic left shoulder pain  (Chronic left shoulder pain [M25.512, G89.29]) MD Genaro Johnson MD          Anesthesia Type: general  Last vitals  /69 (03/08/17 1305)    Temp 97.3 °F (36.3 °C) (03/08/17 1219)    Pulse 87 (03/08/17 1305)   Resp 18 (03/08/17 1305)    SpO2 95 % (03/08/17 1305)      Post Anesthesia Care and Evaluation    Patient location during evaluation: PHASE II  Patient participation: complete - patient participated  Level of consciousness: awake and alert  Pain score: 1  Pain management: adequate  Airway patency: patent  Anesthetic complications: No anesthetic complications  PONV Status: controlled  Cardiovascular status: acceptable  Respiratory status: acceptable  Hydration status: acceptable

## 2017-03-20 DIAGNOSIS — Z98.890 S/P ARTHROSCOPY OF SHOULDER: Primary | ICD-10-CM

## 2017-03-23 ENCOUNTER — OFFICE VISIT (OUTPATIENT)
Dept: ORTHOPEDIC SURGERY | Facility: CLINIC | Age: 72
End: 2017-03-23

## 2017-03-23 ENCOUNTER — HOSPITAL ENCOUNTER (OUTPATIENT)
Dept: GENERAL RADIOLOGY | Facility: HOSPITAL | Age: 72
Discharge: HOME | End: 2017-03-23
Attending: ORTHOPAEDIC SURGERY | Admitting: ORTHOPAEDIC SURGERY

## 2017-03-23 VITALS — BODY MASS INDEX: 25.34 KG/M2 | WEIGHT: 143 LBS | HEIGHT: 63 IN

## 2017-03-23 DIAGNOSIS — Z98.890 S/P ARTHROSCOPY OF SHOULDER: Primary | ICD-10-CM

## 2017-03-23 DIAGNOSIS — Z98.890 S/P ARTHROSCOPY OF SHOULDER: ICD-10-CM

## 2017-03-23 PROCEDURE — 99024 POSTOP FOLLOW-UP VISIT: CPT | Performed by: ORTHOPAEDIC SURGERY

## 2017-03-23 PROCEDURE — 73030 X-RAY EXAM OF SHOULDER: CPT | Performed by: RADIOLOGY

## 2017-03-23 PROCEDURE — 73030 X-RAY EXAM OF SHOULDER: CPT

## 2017-03-23 NOTE — PROGRESS NOTES
Sirisha Valdez   :1945    Date of encounter:2017        HPI:  Sirisha Valdez is a 71 y.o. female who returns here today status post left shoulder arthroscopy with acromioplasty and debridement of the rotator cuff.  Date of surgery was 3/8/2017.  She is now 2 weeks postop.  She states she's been doing well.  She still comings of some mild soreness but nothing compared to what it was prior to surgery.  She denies paresthesias.      Exam:  Examination reveals a clean and dry incisions.  She has mild swelling.  She has forward flexion and abduction to about 80°.  Her neurovascular status is intact.    Radiology:  2 views of the left shoulder revealing no acute fractures or dislocations.    Assessment    ICD-10-CM ICD-9-CM   1. S/P arthroscopy of shoulder Z98.890 V45.89       Plan:  A 71-year-old female 2 weeks status post left shoulder arthroscopy with acromioplasty and debridement of the rotator cuff tendon.  Her incisions look good today and sutures were removed.  We will get her started in gentle range of motion exercises at home including shoulder shrugs, pendulum swings, and table walks.  She was shown how to perform all of these exercises.  She will return back in 4 weeks for reevaluation.    Written by, Dia KEY, acting as a scribe for Dr. Melani MALDONADO, Tesfaye Polo MD, personally performed the services described in this documentation as scribed by the above named individual in my presence, and it is both accurate and complete.  3/24/2017  8:12 AM      CC Dr. Yasmine Reynolds

## 2017-04-24 ENCOUNTER — OFFICE VISIT (OUTPATIENT)
Dept: ORTHOPEDIC SURGERY | Facility: CLINIC | Age: 72
End: 2017-04-24

## 2017-04-24 VITALS — BODY MASS INDEX: 25.34 KG/M2 | WEIGHT: 143 LBS | HEIGHT: 63 IN

## 2017-04-24 DIAGNOSIS — Z98.890 S/P ARTHROSCOPY OF SHOULDER: Primary | ICD-10-CM

## 2017-04-24 PROCEDURE — 99024 POSTOP FOLLOW-UP VISIT: CPT | Performed by: ORTHOPAEDIC SURGERY

## 2017-04-24 NOTE — PROGRESS NOTES
"Patient: Sirisha Valdez    YOB: 1945        History of Present Illness:     She presents back for her left shoulder status post arthroscopy and acromioplasty and rotator cuff debridement a partial tear on March 8.  Doing okay no major complaints today.  Still has some soreness on the top and outside of her shoulder at times.  Still has some pain if she sleeps on it wrong.  He definitely says it is improved compared to prior surgery          Physical Exam: 71 y.o. female  General Appearance:    Alert and oriented x 3, cooperative, in no acute distress                   Vitals:    04/24/17 0805   Weight: 143 lb (64.9 kg)   Height: 63\" (160 cm)          Full internal/external rotation at her side without discomfort.  She can forward flex and abduct about 125° little bit stiffness.  Left hand grossly neurovascular intact without swelling.      Radiology:             Assessment/Plan:  Recovering status post left shoulder arthroscopy.  Discussed the situation with her we talked about physical therapy which she does not want to do.  Would continue to work on gentle stretching and use as tolerates.  We'll see her back on for 5 weeks see how she is coming along for final visit        Discussion/Summary:        This chart was completed utilizing the dragon speech recognition software.  Grammatical errors, random word insertions, pronoun errors, and incomplete sentences or occasional consequences of the system due to software limitations, ambient noise, and hardware issues.  Any questions or concerns about the content, text, or information contained within the body of this dictation should be directly addressed to the physician for clarification        This document was signed by Tesfaye Polo M.D. April 24, 2017 8:14 AM  "

## 2017-05-11 ENCOUNTER — OFFICE VISIT (OUTPATIENT)
Dept: CARDIOLOGY | Facility: CLINIC | Age: 72
End: 2017-05-11

## 2017-05-11 VITALS
SYSTOLIC BLOOD PRESSURE: 121 MMHG | WEIGHT: 141 LBS | HEIGHT: 63 IN | BODY MASS INDEX: 24.98 KG/M2 | DIASTOLIC BLOOD PRESSURE: 67 MMHG | HEART RATE: 90 BPM

## 2017-05-11 DIAGNOSIS — I10 ESSENTIAL HYPERTENSION: ICD-10-CM

## 2017-05-11 DIAGNOSIS — I49.5 SSS (SICK SINUS SYNDROME) (HCC): ICD-10-CM

## 2017-05-11 DIAGNOSIS — I25.10 ARTERIOSCLEROTIC CARDIOVASCULAR DISEASE (ASCVD): Primary | ICD-10-CM

## 2017-05-11 DIAGNOSIS — E78.5 DYSLIPIDEMIA: ICD-10-CM

## 2017-05-11 DIAGNOSIS — I73.9 PVD (PERIPHERAL VASCULAR DISEASE) (HCC): ICD-10-CM

## 2017-05-11 DIAGNOSIS — R00.2 PALPITATIONS: ICD-10-CM

## 2017-05-11 PROCEDURE — 99214 OFFICE O/P EST MOD 30 MIN: CPT | Performed by: INTERNAL MEDICINE

## 2017-05-12 ENCOUNTER — TELEPHONE (OUTPATIENT)
Dept: CARDIOLOGY | Facility: CLINIC | Age: 72
End: 2017-05-12

## 2017-05-17 DIAGNOSIS — M25.552 LEFT HIP PAIN: Primary | ICD-10-CM

## 2017-05-22 ENCOUNTER — OFFICE VISIT (OUTPATIENT)
Dept: ORTHOPEDIC SURGERY | Facility: CLINIC | Age: 72
End: 2017-05-22

## 2017-05-22 ENCOUNTER — HOSPITAL ENCOUNTER (OUTPATIENT)
Dept: GENERAL RADIOLOGY | Facility: HOSPITAL | Age: 72
Discharge: HOME | End: 2017-05-22
Attending: ORTHOPAEDIC SURGERY | Admitting: ORTHOPAEDIC SURGERY

## 2017-05-22 DIAGNOSIS — M25.552 LEFT HIP PAIN: ICD-10-CM

## 2017-05-22 DIAGNOSIS — M79.606 LOW BACK PAIN RADIATING TO LOWER EXTREMITY: ICD-10-CM

## 2017-05-22 DIAGNOSIS — M54.50 LOW BACK PAIN RADIATING TO LOWER EXTREMITY: ICD-10-CM

## 2017-05-22 DIAGNOSIS — Z98.890 S/P ARTHROSCOPY OF SHOULDER: Primary | ICD-10-CM

## 2017-05-22 PROCEDURE — 99024 POSTOP FOLLOW-UP VISIT: CPT | Performed by: ORTHOPAEDIC SURGERY

## 2017-05-22 PROCEDURE — 73502 X-RAY EXAM HIP UNI 2-3 VIEWS: CPT

## 2017-05-22 PROCEDURE — 73502 X-RAY EXAM HIP UNI 2-3 VIEWS: CPT | Performed by: RADIOLOGY

## 2017-05-22 PROCEDURE — 99213 OFFICE O/P EST LOW 20 MIN: CPT | Performed by: ORTHOPAEDIC SURGERY

## 2017-06-08 ENCOUNTER — CLINICAL SUPPORT (OUTPATIENT)
Dept: CARDIOLOGY | Facility: CLINIC | Age: 72
End: 2017-06-08

## 2017-06-08 DIAGNOSIS — I49.5 SSS (SICK SINUS SYNDROME) (HCC): Primary | ICD-10-CM

## 2017-06-08 PROCEDURE — 93280 PM DEVICE PROGR EVAL DUAL: CPT | Performed by: INTERNAL MEDICINE

## 2017-08-15 ENCOUNTER — OFFICE VISIT (OUTPATIENT)
Dept: CARDIOLOGY | Facility: CLINIC | Age: 72
End: 2017-08-15

## 2017-08-15 VITALS
DIASTOLIC BLOOD PRESSURE: 66 MMHG | BODY MASS INDEX: 24.95 KG/M2 | HEIGHT: 63 IN | SYSTOLIC BLOOD PRESSURE: 119 MMHG | HEART RATE: 75 BPM | WEIGHT: 140.8 LBS

## 2017-08-15 DIAGNOSIS — I73.9 PVD (PERIPHERAL VASCULAR DISEASE) (HCC): ICD-10-CM

## 2017-08-15 DIAGNOSIS — I25.10 ARTERIOSCLEROTIC CARDIOVASCULAR DISEASE (ASCVD): Primary | ICD-10-CM

## 2017-08-15 DIAGNOSIS — E78.5 DYSLIPIDEMIA: ICD-10-CM

## 2017-08-15 DIAGNOSIS — I10 ESSENTIAL HYPERTENSION: ICD-10-CM

## 2017-08-15 DIAGNOSIS — D68.9 COAGULOPATHY (HCC): ICD-10-CM

## 2017-08-15 PROCEDURE — 99213 OFFICE O/P EST LOW 20 MIN: CPT | Performed by: INTERNAL MEDICINE

## 2017-08-15 PROCEDURE — 93000 ELECTROCARDIOGRAM COMPLETE: CPT | Performed by: INTERNAL MEDICINE

## 2017-08-15 RX ORDER — NITROGLYCERIN 0.4 MG/1
0.4 TABLET SUBLINGUAL
COMMUNITY

## 2017-08-15 RX ORDER — ASPIRIN 325 MG
325 TABLET, DELAYED RELEASE (ENTERIC COATED) ORAL EVERY 6 HOURS PRN
Status: ON HOLD | COMMUNITY
End: 2017-12-12 | Stop reason: ALTCHOICE

## 2017-08-15 NOTE — PROGRESS NOTES
"Yasmine Reynolds MD  Sirisha Valdez  1945      Patient Active Problem List   Diagnosis   • Arteriosclerotic cardiovascular disease (ASCVD), s/p stenting in 1999, after a myocardial infarction.    • SSS (sick sinus syndrome), s/p permanent pacemaker implantion,    • Palpitations   • Dizziness   • Dyslipidemia, currently on statin   • Essential hypertension, well-contolled.    • PVD (peripheral vascular disease)   • Swelling of both lower extremities   • Chronic left shoulder pain   • S/P arthroscopy of shoulder   • Coagulopathy       Dear Yasmine Reynolds MD:    Subjective     Sirisha Valdez is a 71 y.o. female with the above medical problems who is here today for follow-up. According to the patient she comes in today complaining of multiple problems most of which are not cardiac in nature.  She states she is having episodes of tremors after standing up from a sitting position that appeared to be orthostatic in nature.  I advised the patient the importance of drinking fluid.  She also complains of lower extremity swelling.  According to the patient she was recently evaluated by her primary care provider and told that she needed further assessment of her lower extremity circulation since she has a history of peripheral vascular disease and had undergone grafting in the past.  A review of her chart shows that we recently did an ALICE on her which showed mildly abnormal circulation.  However further testing could not be done as she has an abnormal kidney function cannot undergo procedures that required dye.  He also states she continues to smoke and has no intention of discontinuing this.  I advised her of the importance of tobacco cessation and the risks of continued smoking the setting of significant COPD and peripheral vascular disease.  She also states that when her blood was drawn recently she was told that it was \"too thick\" this point is unclear what this patient.  She states that when it was " placed on a vacuum tube the blood apparently coagulated.         Current Outpatient Prescriptions:   •  aspirin  MG tablet, Take 325 mg by mouth Every 6 (Six) Hours As Needed., Disp: , Rfl:   •  cilostazol (PLETAL) 50 MG tablet, Take 1 tablet by mouth 2 (Two) Times a Day., Disp: 60 tablet, Rfl: 6  •  diazepam (VALIUM) 10 MG tablet, Take 10 mg by mouth 2 (two) times a day., Disp: , Rfl:   •  HYDROcodone-acetaminophen (NORCO) 7.5-325 MG per tablet, Take 1-2 tablets by mouth Every 4 (Four) Hours As Needed for moderate pain (4-6) (Pain) for up to 24 doses., Disp: 24 tablet, Rfl: 0  •  LYRICA 100 MG capsule, , Disp: , Rfl: 2  •  metoprolol tartrate (LOPRESSOR) 50 MG tablet, Take 50 mg by mouth 2 (two) times a day., Disp: , Rfl:   •  nitroglycerin (NITROSTAT) 0.4 MG SL tablet, Place 0.4 mg under the tongue Every 5 (Five) Minutes As Needed for Chest Pain. Take no more than 3 doses in 15 minutes., Disp: , Rfl:   •  rosuvastatin (CRESTOR) 40 MG tablet, Take 40 mg by mouth Every Night., Disp: , Rfl: 5  •  aspirin 81 MG EC tablet, Take 81 mg by mouth daily., Disp: , Rfl:   •  benzonatate (TESSALON) 200 MG capsule, Take 200 mg by mouth 3 (Three) Times a Day As Needed for cough., Disp: , Rfl:     The following portions of the patient's history were reviewed and updated as appropriate: allergies, current medications, past family history, past medical history, past social history, past surgical history and problem list.    Review of Systems   Constitution: Negative for chills, diaphoresis and fever.   HENT: Negative for congestion, ear pain, hearing loss and stridor.    Eyes: Negative for blurred vision, pain and redness.   Cardiovascular: Positive for leg swelling. Negative for chest pain, orthopnea, palpitations and paroxysmal nocturnal dyspnea.   Respiratory: Negative for cough, hemoptysis and shortness of breath.    Endocrine: Negative for cold intolerance and heat intolerance.   Hematologic/Lymphatic: Does not  "bruise/bleed easily.   Skin: Negative for rash.   Musculoskeletal: Negative for arthritis and myalgias.   Gastrointestinal: Positive for diarrhea. Negative for abdominal pain, constipation, hematemesis, hematochezia, melena, nausea and vomiting.   Genitourinary: Negative for dysuria and hematuria.   Neurological: Positive for dizziness. Negative for focal weakness and numbness.   Psychiatric/Behavioral: Negative for depression. The patient is not nervous/anxious.        Objective   Blood pressure 119/66, pulse 75, height 63\" (160 cm), weight 140 lb 12.8 oz (63.9 kg).    Physical Exam   Constitutional: She is oriented to person, place, and time. She appears well-developed and well-nourished.   White female sitting comfortably on chair.   HENT:   Mouth/Throat: Oropharynx is clear and moist.   Eyes: EOM are normal. Pupils are equal, round, and reactive to light.   Neck: Neck supple. No JVD present. No tracheal deviation present. No thyromegaly present.   Cardiovascular: Normal rate, regular rhythm, S1 normal and S2 normal.  Exam reveals no gallop and no friction rub.    No murmur heard.  Pulmonary/Chest: Effort normal. No respiratory distress. She has no wheezes. She has rales.   Abdominal: Soft. Bowel sounds are normal. She exhibits no mass. There is no tenderness.   Musculoskeletal: Normal range of motion. She exhibits no edema.   Lymphadenopathy:     She has no cervical adenopathy.   Neurological: She is alert and oriented to person, place, and time.   Skin: Skin is warm and dry. No rash noted.   Psychiatric: She has a normal mood and affect.         ECG 12 Lead  Date/Time: 8/15/2017 9:48 AM  Performed by: PAM ROY  Authorized by: PAM ROY   Comparison: compared with previous ECG from 2/16/2017  Similar to previous ECG  Rhythm: sinus rhythm  Rate: normal  BPM: 71  Conduction: LAFB  ST Segments: ST segments normal  T Waves: T waves normal  QRS axis: left  Other: no other " findings  Clinical impression: normal ECG  Comments: AV pacing on magnet application.            Assessment/Plan     1.  Peripheral vascular disease: Patient with history of peripheral vascular disease status post femorofemoral bypass as well as bilateral lower extremity sent placement.  ABIs done during last visit showed decreased circulation to right lower extremity with triphasic flow.  She has tolerated restarting cilostazol well and states this has helped her symptoms.  At this point will continue.      2.  Coronary artery disease: Patient with history of coronary artery disease currently on aspirin,beta blocker and statin.  Unable to start ACE inhibitor due to renal dysfunction.  At this point we'll continue current regimen.    3.  Hypertension: Patient is a history of hypertension currently well controlled on current regimen.     4.  Coagulopathy: Patient with questionable coagulopathy.  At this point will obtain a PT and PTT to evaluate her clotting status.    5.  Dyslipidemia: Patient with history of dyslipidemia.  Lipid panel showed adequate lipid control.  We will obtain labs from Dr. Reynolds.    6. Tobacco abuse: The patient states she has not made any progress on discontinuing tobacco use.  I once again counseled her on the importance of tobacco cessation as well as techniques to discontinue tobacco use.  She states she will try     Diagnosis Plan   1. Arteriosclerotic cardiovascular disease (ASCVD), s/p stenting in 1999, after a myocardial infarction.   ECG 12 Lead    Adult Transthoracic Echo Complete   2. PVD (peripheral vascular disease)     3. Essential hypertension, well-contolled.      4. Coagulopathy  Protime-INR    aPTT   5. Dyslipidemia, currently on statin  Lipid Panel          Return in about 4 weeks (around 9/12/2017).    I appreciate the opportunity to participate in this patient's cardiovascular care.    Best Regards    Jose F Be

## 2017-08-22 ENCOUNTER — APPOINTMENT (OUTPATIENT)
Dept: CARDIOLOGY | Facility: HOSPITAL | Age: 72
End: 2017-08-22
Attending: INTERNAL MEDICINE

## 2017-09-28 ENCOUNTER — TRANSCRIBE ORDERS (OUTPATIENT)
Dept: ADMINISTRATIVE | Facility: HOSPITAL | Age: 72
End: 2017-09-28

## 2017-09-28 DIAGNOSIS — Z12.31 VISIT FOR SCREENING MAMMOGRAM: Primary | ICD-10-CM

## 2017-10-11 ENCOUNTER — HOSPITAL ENCOUNTER (OUTPATIENT)
Dept: MAMMOGRAPHY | Facility: HOSPITAL | Age: 72
Discharge: HOME | End: 2017-10-11
Admitting: FAMILY MEDICINE

## 2017-10-11 ENCOUNTER — HOSPITAL ENCOUNTER (OUTPATIENT)
Dept: CARDIOLOGY | Facility: HOSPITAL | Age: 72
Discharge: HOME | End: 2017-10-11
Attending: INTERNAL MEDICINE | Admitting: INTERNAL MEDICINE

## 2017-10-11 DIAGNOSIS — Z12.31 VISIT FOR SCREENING MAMMOGRAM: ICD-10-CM

## 2017-10-11 DIAGNOSIS — I25.10 ARTERIOSCLEROTIC CARDIOVASCULAR DISEASE (ASCVD): ICD-10-CM

## 2017-10-11 PROCEDURE — G0202 SCR MAMMO BI INCL CAD: HCPCS

## 2017-10-11 PROCEDURE — 93306 TTE W/DOPPLER COMPLETE: CPT

## 2017-10-11 PROCEDURE — 77063 BREAST TOMOSYNTHESIS BI: CPT

## 2017-10-11 PROCEDURE — 77063 BREAST TOMOSYNTHESIS BI: CPT | Performed by: RADIOLOGY

## 2017-10-11 PROCEDURE — G0202 SCR MAMMO BI INCL CAD: HCPCS | Performed by: RADIOLOGY

## 2017-10-11 PROCEDURE — 93306 TTE W/DOPPLER COMPLETE: CPT | Performed by: INTERNAL MEDICINE

## 2017-10-12 LAB
BH CV ECHO MEAS - ACS: 2.2 CM
BH CV ECHO MEAS - AO ROOT AREA (BSA CORRECTED): 1.7
BH CV ECHO MEAS - AO ROOT AREA: 5.9 CM^2
BH CV ECHO MEAS - AO ROOT DIAM: 2.7 CM
BH CV ECHO MEAS - BSA(HAYCOCK): 1.7 M^2
BH CV ECHO MEAS - BSA: 1.7 M^2
BH CV ECHO MEAS - BZI_BMI: 24.8 KILOGRAMS/M^2
BH CV ECHO MEAS - BZI_METRIC_HEIGHT: 160 CM
BH CV ECHO MEAS - BZI_METRIC_WEIGHT: 63.5 KG
BH CV ECHO MEAS - CONTRAST EF 4CH: 65.5 ML/M^2
BH CV ECHO MEAS - EDV(CUBED): 73.1 ML
BH CV ECHO MEAS - EDV(MOD-SP4): 58 ML
BH CV ECHO MEAS - EDV(TEICH): 77.8 ML
BH CV ECHO MEAS - EF(CUBED): 69.4 %
BH CV ECHO MEAS - EF(MOD-SP4): 65.5 %
BH CV ECHO MEAS - EF(TEICH): 61.4 %
BH CV ECHO MEAS - ESV(CUBED): 22.4 ML
BH CV ECHO MEAS - ESV(MOD-SP4): 20 ML
BH CV ECHO MEAS - ESV(TEICH): 30 ML
BH CV ECHO MEAS - FS: 32.6 %
BH CV ECHO MEAS - IVS/LVPW: 0.86
BH CV ECHO MEAS - IVSD: 1.2 CM
BH CV ECHO MEAS - LA DIMENSION: 3.6 CM
BH CV ECHO MEAS - LA/AO: 1.3
BH CV ECHO MEAS - LV DIASTOLIC VOL/BSA (35-75): 34.9 ML/M^2
BH CV ECHO MEAS - LV MASS(C)D: 206.4 GRAMS
BH CV ECHO MEAS - LV MASS(C)DI: 124.2 GRAMS/M^2
BH CV ECHO MEAS - LV SYSTOLIC VOL/BSA (12-30): 12 ML/M^2
BH CV ECHO MEAS - LVIDD: 4.2 CM
BH CV ECHO MEAS - LVIDS: 2.8 CM
BH CV ECHO MEAS - LVLD AP4: 7.3 CM
BH CV ECHO MEAS - LVLS AP4: 6 CM
BH CV ECHO MEAS - LVOT AREA (M): 3.5 CM^2
BH CV ECHO MEAS - LVOT AREA: 3.4 CM^2
BH CV ECHO MEAS - LVOT DIAM: 2.1 CM
BH CV ECHO MEAS - LVPWD: 1.4 CM
BH CV ECHO MEAS - MV A MAX VEL: 104.1 CM/SEC
BH CV ECHO MEAS - MV E MAX VEL: 79 CM/SEC
BH CV ECHO MEAS - MV E/A: 0.76
BH CV ECHO MEAS - PA ACC SLOPE: 636.9 CM/SEC^2
BH CV ECHO MEAS - PA ACC TIME: 0.16 SEC
BH CV ECHO MEAS - PA PR(ACCEL): 6.1 MMHG
BH CV ECHO MEAS - RAP SYSTOLE: 10 MMHG
BH CV ECHO MEAS - RVDD: 2.5 CM
BH CV ECHO MEAS - RVSP: 26.7 MMHG
BH CV ECHO MEAS - SI(CUBED): 30.5 ML/M^2
BH CV ECHO MEAS - SI(MOD-SP4): 22.9 ML/M^2
BH CV ECHO MEAS - SI(TEICH): 28.7 ML/M^2
BH CV ECHO MEAS - SV(CUBED): 50.7 ML
BH CV ECHO MEAS - SV(MOD-SP4): 38 ML
BH CV ECHO MEAS - SV(TEICH): 47.7 ML
BH CV ECHO MEAS - TR MAX VEL: 204.1 CM/SEC
LV EF 2D ECHO EST: 65 %

## 2017-10-13 ENCOUNTER — TELEPHONE (OUTPATIENT)
Dept: CARDIOLOGY | Facility: CLINIC | Age: 72
End: 2017-10-13

## 2017-10-13 NOTE — TELEPHONE ENCOUNTER
The patient's Procardia was noted to be within normal limits.  However remind the patient she has not completed the blood work ordered.

## 2017-10-16 ENCOUNTER — HOSPITAL ENCOUNTER (OUTPATIENT)
Dept: MAMMOGRAPHY | Facility: HOSPITAL | Age: 72
Discharge: HOME | End: 2017-10-16
Admitting: RADIOLOGY

## 2017-10-16 DIAGNOSIS — R92.8 ABNORMAL MAMMOGRAM: ICD-10-CM

## 2017-10-16 PROCEDURE — G0279 TOMOSYNTHESIS, MAMMO: HCPCS

## 2017-10-16 PROCEDURE — G0279 TOMOSYNTHESIS, MAMMO: HCPCS | Performed by: RADIOLOGY

## 2017-10-16 PROCEDURE — G0204 DX MAMMO INCL CAD BI: HCPCS | Performed by: RADIOLOGY

## 2017-10-16 PROCEDURE — G0204 DX MAMMO INCL CAD BI: HCPCS

## 2017-10-17 ENCOUNTER — APPOINTMENT (OUTPATIENT)
Dept: MAMMOGRAPHY | Facility: HOSPITAL | Age: 72
End: 2017-10-17
Attending: RADIOLOGY

## 2017-11-16 ENCOUNTER — APPOINTMENT (OUTPATIENT)
Dept: MAMMOGRAPHY | Facility: HOSPITAL | Age: 72
End: 2017-11-16

## 2017-11-16 ENCOUNTER — APPOINTMENT (OUTPATIENT)
Dept: CARDIOLOGY | Facility: HOSPITAL | Age: 72
End: 2017-11-16

## 2017-11-28 ENCOUNTER — HOSPITAL ENCOUNTER (OUTPATIENT)
Dept: MAMMOGRAPHY | Facility: HOSPITAL | Age: 72
Discharge: HOME | End: 2017-11-28
Admitting: RADIOLOGY

## 2017-11-28 ENCOUNTER — APPOINTMENT (OUTPATIENT)
Dept: CARDIOLOGY | Facility: HOSPITAL | Age: 72
End: 2017-11-28

## 2017-11-28 ENCOUNTER — HOSPITAL ENCOUNTER (OUTPATIENT)
Dept: MAMMOGRAPHY | Facility: HOSPITAL | Age: 72
Discharge: HOME | End: 2017-11-28

## 2017-11-28 DIAGNOSIS — R92.8 ABNORMAL MAMMOGRAM: ICD-10-CM

## 2017-11-28 PROCEDURE — 88360 TUMOR IMMUNOHISTOCHEM/MANUAL: CPT | Performed by: RADIOLOGY

## 2017-11-28 PROCEDURE — 88342 IMHCHEM/IMCYTCHM 1ST ANTB: CPT | Performed by: RADIOLOGY

## 2017-11-28 PROCEDURE — A4648 IMPLANTABLE TISSUE MARKER: HCPCS

## 2017-11-28 PROCEDURE — 19081 BX BREAST 1ST LESION STRTCTC: CPT | Performed by: RADIOLOGY

## 2017-11-28 PROCEDURE — 19082 BX BREAST ADD LESION STRTCTC: CPT | Performed by: RADIOLOGY

## 2017-11-28 PROCEDURE — 88305 TISSUE EXAM BY PATHOLOGIST: CPT | Performed by: RADIOLOGY

## 2017-12-01 ENCOUNTER — TELEPHONE (OUTPATIENT)
Dept: MAMMOGRAPHY | Facility: HOSPITAL | Age: 72
End: 2017-12-01

## 2017-12-01 NOTE — TELEPHONE ENCOUNTER
Patient informed of biopsy results. Patient requesting to see Dr. Babin appointment set up for Monday December 1st.. Patient has numbers to call the breast care nurse if needed.

## 2017-12-01 NOTE — TELEPHONE ENCOUNTER
Daughters of patient called to get information about the patients biopsy results and information about the surgeon patient has chosen to see. Spent 10 minutes with each daughter explaing the type of cancer and the receptors and the significance of the biopsy results. Encouraged family to call any time they had a question or concern.

## 2017-12-04 ENCOUNTER — OFFICE VISIT (OUTPATIENT)
Dept: SURGERY | Facility: CLINIC | Age: 72
End: 2017-12-04

## 2017-12-04 VITALS
HEART RATE: 80 BPM | DIASTOLIC BLOOD PRESSURE: 69 MMHG | HEIGHT: 63 IN | SYSTOLIC BLOOD PRESSURE: 159 MMHG | WEIGHT: 139.2 LBS | BODY MASS INDEX: 24.66 KG/M2

## 2017-12-04 DIAGNOSIS — C50.911 BILATERAL MALIGNANT NEOPLASM OF BREAST IN FEMALE, ESTROGEN RECEPTOR POSITIVE, UNSPECIFIED SITE OF BREAST (HCC): Primary | ICD-10-CM

## 2017-12-04 DIAGNOSIS — C50.912 BILATERAL MALIGNANT NEOPLASM OF BREAST IN FEMALE, ESTROGEN RECEPTOR POSITIVE, UNSPECIFIED SITE OF BREAST (HCC): Primary | ICD-10-CM

## 2017-12-04 DIAGNOSIS — Z17.0 BILATERAL MALIGNANT NEOPLASM OF BREAST IN FEMALE, ESTROGEN RECEPTOR POSITIVE, UNSPECIFIED SITE OF BREAST (HCC): Primary | ICD-10-CM

## 2017-12-04 LAB
LAB AP CASE REPORT: NORMAL
LAB AP CASE REPORT: NORMAL
LAB AP CLINICAL INFORMATION: NORMAL
Lab: NORMAL
Lab: NORMAL
PATH REPORT.FINAL DX SPEC: NORMAL
PATH REPORT.FINAL DX SPEC: NORMAL

## 2017-12-04 PROCEDURE — 93702 BIS XTRACELL FLUID ANALYSIS: CPT | Performed by: SURGERY

## 2017-12-04 PROCEDURE — 99204 OFFICE O/P NEW MOD 45 MIN: CPT | Performed by: SURGERY

## 2017-12-04 NOTE — PROGRESS NOTES
Subjective   Sirisha Valdez is a 72 y.o. female is being seen for consultation today at the request of Dr. James for breast cancer.    History of Present Illness  Ms. Valdez was seen in the office today for her new diagnosis of breast cancer. This is a 72 year female who underwent a screening mammogram with tomosynthesis on 10/16/17 which demonstrated architectural distortion of both breasts.  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  The patient denies palpable mass or nipple discharge.  There is a remote history of a benign left breast biopsy.  As far as risk factors go, Monica was 20 at the time that she had her first child, with an onset of menses at age 16.  There is no family history of breast or ovarian cancer.  The patient is postmenopausal and not on hormone replacement therapy.  Allergies   Allergen Reactions   • Promethazine      hypotension     No current facility-administered medications for this visit.      No current outpatient prescriptions on file.     Facility-Administered Medications Ordered in Other Visits   Medication Dose Route Frequency Provider Last Rate Last Dose   • ceFAZolin (ANCEF) IVPB (duplex) 2 g  2 g Intravenous Once Lida Babin MD         Past Medical History:   Diagnosis Date   • ACD (adult celiac disease)    • Arthritis    • Breast cancer    • Coronary artery disease     3 HEART STENTS   • Gastroesophageal reflux    • Glaucoma    • Hypertension    • Migraine    • Peripheral arterial disease     KIM LEG STENTS       ANDFEM POP   • Peripheral vascular disease    • SSS (sick sinus syndrome)    • Status post placement of cardiac pacemaker    • Wears dentures    • Wears glasses      Past Surgical History:   Procedure Laterality Date   • BREAST SURGERY      Mass Removal.   • CARDIAC CATHETERIZATION     • CARPAL TUNNEL RELEASE     • CATARACT EXTRACTION    "  • FEMORAL DISTAL BYPASS     • HIP SURGERY     • HYSTERECTOMY     • PACEMAKER IMPLANTATION     • SHOULDER ARTHROSCOPY W/ ROTATOR CUFF REPAIR Left 3/8/2017    Procedure: LEFT SHOULDER ARTHROSCOPY, ACROMIOPLASTY, SUBACROMIC DEBRIDMENT;  Surgeon: Tesfaye Polo MD;  Location: [facility];  Service:    • SHOULDER SURGERY       Review of Systems  General: chills  Integumentary: negative  Eyes: eyesight problems, glasses, red eyes, eyes itch  ENT: negative  Respiratory: pneumonia  Gastrointestinal: diarrhea  Cardiovascular: negative  Neurological: negative  Psychiatric: depression and mood swings  Hematologic/Lymphatic: easy bleeding and easy bruising  Genitourinary: negative  Musculoskeletal: negative  Endocrine: negative  Breasts: negative    The following portions of the patient's history were reviewed and updated as appropriate: allergies, current medications, past family history, past medical history, past social history, past surgical history and problem list.    Objective   /69 (BP Location: Left arm, Patient Position: Lying)  Pulse 80  Ht 160 cm (63\")  Wt 63.1 kg (139 lb 3.2 oz)  BMI 24.66 kg/m2  Physical Exam  General:  This is a WD WN white female in no acute distress  Vital signs stable, afebrile  HEENT exam:  WNL. Sclera are anicteric.  EOMI  Neck:  supple, FROM.  No JVD.  Trachea midline  Lungs:  Respiratory effort normal. Auscultation: Coarse, bilateral expiratory rhonchi  Heart:  Regular rate and rhythm, without murmur, gallop, rub.  No pedal edema  Breasts: On visual inspection the breasts are symmetrical. Examination of the right breast demonstrates no discrete mass, skin change, or axillary adenopathy.  Examination of the left breast demonstrates no discrete mass, skin change, or axillary adenopathy.  Abdomen: Nontender, without hepatosplenomegaly  Musculoskeletal:  muscle strength/tone is normal.    Psyc:  alert, oriented x 3.  Mood and affect are appropriate  skin:  Warm with good " turgor.  Without rash or lesion  extremities:  Examination of the extremities revealed no cyanosis, clubbing or edema.    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  Grade 1 infiltrating ductal carcinoma of the left breast    Plan: Proceed with right lumpectomy with needle localization and left lumpectomy with needle localization and sentinel lymph node biopsy, possible axillary node dissection       Discussion/Summary: As the patient is followed by cardiology she will require cardiology clearance.  The risks of the procedure were discussed with the patient including the possible need for additional surgery.  She is also aware that if the sentinel node is positive she will require an axillary node dissection.  She is aware that mastectomy is an option.  She is aware that she may or may not require postoperative radiation.    Errors in dictation may reflect use of voice recognition software and not all errors in transcription may have been detected prior to signing.    Future Appointments  Date Time Provider Department Center   12/18/2017 4:40 PM Lida Babin MD [facility] None   1/18/2018 11:40 AM MD MUMTA[facility] M[facility] None

## 2017-12-04 NOTE — PROGRESS NOTES
Tom Valdez is a 72 y.o. female is being seen for consultation today at the request of Dr. James for breast cancer.    History of Present Illness    Allergies   Allergen Reactions   • Promethazine      hypotension   • Cefzil [Cefprozil]      Current Outpatient Prescriptions   Medication Sig Dispense Refill   • aspirin 81 MG EC tablet Take 81 mg by mouth daily.     • cilostazol (PLETAL) 50 MG tablet Take 1 tablet by mouth 2 (Two) Times a Day. 60 tablet 6   • diazepam (VALIUM) 10 MG tablet Take 10 mg by mouth 2 (two) times a day.     • HYDROcodone-acetaminophen (NORCO) 7.5-325 MG per tablet Take 1-2 tablets by mouth Every 4 (Four) Hours As Needed for moderate pain (4-6) (Pain) for up to 24 doses. 24 tablet 0   • metoprolol tartrate (LOPRESSOR) 50 MG tablet Take 50 mg by mouth 2 (two) times a day.     • nitroglycerin (NITROSTAT) 0.4 MG SL tablet Place 0.4 mg under the tongue Every 5 (Five) Minutes As Needed for Chest Pain. Take no more than 3 doses in 15 minutes.     • rosuvastatin (CRESTOR) 40 MG tablet Take 40 mg by mouth Every Night.  5   • aspirin  MG tablet Take 325 mg by mouth Every 6 (Six) Hours As Needed.     • benzonatate (TESSALON) 200 MG capsule Take 200 mg by mouth 3 (Three) Times a Day As Needed for cough.     • LYRICA 100 MG capsule   2     No current facility-administered medications for this visit.      Past Medical History:   Diagnosis Date   • ACD (adult celiac disease)    • Arthritis    • Breast cancer    • COPD (chronic obstructive pulmonary disease)    • Coronary artery disease     3 HEART STENTS   • Gastroesophageal reflux    • Glaucoma    • Hypertension    • Migraine    • Peripheral arterial disease     KIM LEG STENTS       ANDFEM POP   • Peripheral vascular disease    • SSS (sick sinus syndrome)    • Status post placement of cardiac pacemaker      Past Surgical History:   Procedure Laterality Date   • BREAST SURGERY      Mass Removal.   • CARDIAC CATHETERIZATION     •  "CARPAL TUNNEL RELEASE     • CATARACT EXTRACTION     • FEMORAL DISTAL BYPASS     • HIP SURGERY     • HYSTERECTOMY     • PACEMAKER IMPLANTATION     • SHOULDER ARTHROSCOPY W/ ROTATOR CUFF REPAIR Left 3/8/2017    Procedure: LEFT SHOULDER ARTHROSCOPY, ACROMIOPLASTY, SUBACROMIC DEBRIDMENT;  Surgeon: Tesfaye Polo MD;  Location: [facility];  Service:    • SHOULDER SURGERY       Review of Systems  General: weight loss 7lbs  Integumentary: negative  Eyes: negative  ENT: negative  Respiratory: shortness of breath, chronic cough, wheezing, coughing up blood and pneumonia  Gastrointestinal: nausea/vomiting and abdominal pain  Cardiovascular: negative  Neurological: negative  Psychiatric: anxiety  Hematologic/Lymphatic: negative  Genitourinary: painful urination and frequent urination  Musculoskeletal: painful joints, sore muscles, joint swelling, back pain and joint stiffness  Endocrine: negative  Breasts: negative    The following portions of the patient's history were reviewed and updated as appropriate: allergies, current medications, past family history, past medical history, past social history, past surgical history and problem list.    Objective   /69 (BP Location: Left arm, Patient Position: Lying)  Pulse 80  Ht 63\" (160 cm)  Wt 139 lb 3.2 oz (63.1 kg)  BMI 24.66 kg/m2  Physical Exam    Results/Data    Procedures       Assessment/Plan     No problem-specific Assessment & Plan notes found for this encounter.           Discussion/Summary    Errors in dictation may reflect use of voice recognition software and not all errors in transcription may have been detected prior to signing.    Future Appointments  Date Time Provider Department Center   12/7/2017 11:40 AM Jose F Ingram MD [facility] None     "

## 2017-12-05 PROBLEM — Z17.0 BILATERAL MALIGNANT NEOPLASM OF BREAST IN FEMALE, ESTROGEN RECEPTOR POSITIVE (HCC): Status: ACTIVE | Noted: 2017-12-05

## 2017-12-05 PROBLEM — C50.912 BILATERAL MALIGNANT NEOPLASM OF BREAST IN FEMALE, ESTROGEN RECEPTOR POSITIVE (HCC): Status: ACTIVE | Noted: 2017-12-05

## 2017-12-05 PROBLEM — C50.911 BILATERAL MALIGNANT NEOPLASM OF BREAST IN FEMALE, ESTROGEN RECEPTOR POSITIVE: Status: ACTIVE | Noted: 2017-12-05

## 2017-12-07 ENCOUNTER — DOCUMENTATION (OUTPATIENT)
Dept: CARDIOLOGY | Facility: CLINIC | Age: 72
End: 2017-12-07

## 2017-12-07 ENCOUNTER — TELEPHONE (OUTPATIENT)
Dept: CARDIOLOGY | Facility: CLINIC | Age: 72
End: 2017-12-07

## 2017-12-07 NOTE — TELEPHONE ENCOUNTER
Pt just found out she has breast cancer and needs to be cleared for surgery by next Tuesday. Is there anyway we can look into her chart and see if she needs to be seen first.       Thank you

## 2017-12-08 DIAGNOSIS — C50.812 MALIGNANT NEOPLASM OF OVERLAPPING SITES OF LEFT FEMALE BREAST, UNSPECIFIED ESTROGEN RECEPTOR STATUS (HCC): Primary | ICD-10-CM

## 2017-12-11 ENCOUNTER — TELEPHONE (OUTPATIENT)
Dept: SURGERY | Facility: CLINIC | Age: 72
End: 2017-12-11

## 2017-12-11 ENCOUNTER — APPOINTMENT (OUTPATIENT)
Dept: PREADMISSION TESTING | Facility: HOSPITAL | Age: 72
End: 2017-12-11

## 2017-12-11 LAB
ANION GAP SERPL CALCULATED.3IONS-SCNC: 7.6 MMOL/L (ref 3.6–11.2)
BUN BLD-MCNC: 6 MG/DL (ref 7–21)
BUN/CREAT SERPL: 5.8 (ref 7–25)
CALCIUM SPEC-SCNC: 9.5 MG/DL (ref 7.7–10)
CHLORIDE SERPL-SCNC: 111 MMOL/L (ref 99–112)
CO2 SERPL-SCNC: 24.4 MMOL/L (ref 24.3–31.9)
CREAT BLD-MCNC: 1.03 MG/DL (ref 0.43–1.29)
DEPRECATED RDW RBC AUTO: 45.8 FL (ref 37–54)
ERYTHROCYTE [DISTWIDTH] IN BLOOD BY AUTOMATED COUNT: 13.9 % (ref 11.5–14.5)
GFR SERPL CREATININE-BSD FRML MDRD: 53 ML/MIN/1.73
GLUCOSE BLD-MCNC: 93 MG/DL (ref 70–110)
HCT VFR BLD AUTO: 45.2 % (ref 37–47)
HGB BLD-MCNC: 15.1 G/DL (ref 12–16)
MCH RBC QN AUTO: 31.5 PG (ref 27–33)
MCHC RBC AUTO-ENTMCNC: 33.4 G/DL (ref 33–37)
MCV RBC AUTO: 94.4 FL (ref 80–94)
OSMOLALITY SERPL CALC.SUM OF ELEC: 282.3 MOSM/KG (ref 273–305)
PLATELET # BLD AUTO: 228 10*3/MM3 (ref 130–400)
PMV BLD AUTO: 9.9 FL (ref 6–10)
POTASSIUM BLD-SCNC: 3.6 MMOL/L (ref 3.5–5.3)
RBC # BLD AUTO: 4.79 10*6/MM3 (ref 4.2–5.4)
SODIUM BLD-SCNC: 143 MMOL/L (ref 135–153)
WBC NRBC COR # BLD: 10.91 10*3/MM3 (ref 4.5–12.5)

## 2017-12-11 PROCEDURE — 86300 IMMUNOASSAY TUMOR CA 15-3: CPT | Performed by: SURGERY

## 2017-12-11 PROCEDURE — 80048 BASIC METABOLIC PNL TOTAL CA: CPT | Performed by: ANESTHESIOLOGY

## 2017-12-11 PROCEDURE — 85027 COMPLETE CBC AUTOMATED: CPT | Performed by: ANESTHESIOLOGY

## 2017-12-11 PROCEDURE — 36415 COLL VENOUS BLD VENIPUNCTURE: CPT

## 2017-12-11 NOTE — DISCHARGE INSTRUCTIONS
0730---12/12/17   ARRIVAL TIME  TAKE the following medications the morning of surgery:  All heart or blood pressure medications    HOLD all diabetic medications the morning of surgery as ordered by physician.    Please discontinue all blood thinners and anticoagulants (except aspirin) prior to surgery as per your surgeon and cardiologist instructions.  Aspirin may be continued up to the day prior to surgery.     CHLORHEXIDINE CLOTHS GIVEN WITH INSTRUCTIONS AND FORM TO RETURN TO HOSPITAL    General Instructions:  · Do not eat or drink after midnight: includes water, mints, or gum. You may brush your teeth.  Dental appliances that are removable must be taken out day of surgery.  · Do not smoke, chew tobacco, or drink alcohol.  · Bring medications in original bottles, any inhalers and if applicable your C-PAP/BI-PAP machine.  · Bring any papers given to you in the doctor's office.  · Wear clean comfortable clothes and socks.  · Do not wear contact lenses or make-up. Bring a case for your glasses if applicable.  · Bring crutches or walker if applicable.  · Leave all other valuables and jewelry at home.    If you were given a blood bank ID arm band remember to bring it with you the day of surgery.    Preventing a Surgical Site Infection:  Shower on the morning of surgery using a fresh bar of anti-bacterial soap (such as Dial) and clean washcloth. Dry with a clean towel and dress in clean clothing.  For 2 to 3 days before surgery, avoid shaving with a razor near where you will have surgery because the razor can irritate skin and make it easier to develop an infection. Ask your surgeon if you will be receiving antibiotics prior to surgery.  Make sure you, your family, and all healthcare providers clear their hands with soap and water or an alcohol-based hand  before caring for you or your wound.  If at all possible, quit smoking as many days before surgery as you can.    Day of surgery:  Upon arrival, a Pre-op  nurse and Anesthesiologist will review your health history, obtain vital signs, and answer questions you may have. The only belongings needed at this time will be your home medications and if applicable your C-PAP/BI-PAP machine. If you are staying overnight your family can leave the rest of your belongings in the car and bring them to your room later. A Pre-op nurse will start an IV and you may receive medication in preparation for surgery, including something to help you relax. Your family will be able to see you in the Pre-op area. While you are in surgery your family should notify the waiting room  if they leave the waiting room area and provide a contact phone number.    Please be aware that surgery does come with discomfort. We want to make every effort to control your discomfort so please discuss any uncontrolled symptoms with your nurse. Your doctor will most likely have prescribed pain medications.    If you are going home after surgery you will receive individualized written care instructions before being discharged. A responsible adult must drive you to and from the hospital on the day of surgery and stay with you for 24 hours.    If you are staying overnight following surgery, you will be transported to your hospital room following the recovery period.  [facility] has all private rooms.    If you have any questions please call Pre-Admission Testing at [phone].  Deductibles and co-payments are collected on the day of service. Please be prepared to pay the required co-pay, deductible or deposit on the day of service as defined by your plan.

## 2017-12-12 ENCOUNTER — HOSPITAL ENCOUNTER (OUTPATIENT)
Dept: NUCLEAR MEDICINE | Facility: HOSPITAL | Age: 72
Discharge: HOME | End: 2017-12-12
Attending: SURGERY

## 2017-12-12 ENCOUNTER — HOSPITAL ENCOUNTER (OUTPATIENT)
Facility: HOSPITAL | Age: 72
Setting detail: HOSPITAL OUTPATIENT SURGERY
Discharge: HOME | End: 2017-12-12
Attending: SURGERY | Admitting: SURGERY

## 2017-12-12 ENCOUNTER — APPOINTMENT (OUTPATIENT)
Dept: MAMMOGRAPHY | Facility: HOSPITAL | Age: 72
End: 2017-12-12

## 2017-12-12 ENCOUNTER — ANESTHESIA (OUTPATIENT)
Dept: PERIOP | Facility: HOSPITAL | Age: 72
End: 2017-12-12

## 2017-12-12 ENCOUNTER — ANESTHESIA EVENT (OUTPATIENT)
Dept: PERIOP | Facility: HOSPITAL | Age: 72
End: 2017-12-12

## 2017-12-12 VITALS
HEART RATE: 74 BPM | HEIGHT: 63 IN | SYSTOLIC BLOOD PRESSURE: 136 MMHG | RESPIRATION RATE: 16 BRPM | WEIGHT: 139 LBS | DIASTOLIC BLOOD PRESSURE: 78 MMHG | BODY MASS INDEX: 24.63 KG/M2 | TEMPERATURE: 98.7 F | OXYGEN SATURATION: 93 %

## 2017-12-12 DIAGNOSIS — R92.8 ABNORMAL MAMMOGRAM: ICD-10-CM

## 2017-12-12 DIAGNOSIS — Z17.0 BILATERAL MALIGNANT NEOPLASM OF BREAST IN FEMALE, ESTROGEN RECEPTOR POSITIVE, UNSPECIFIED SITE OF BREAST (HCC): ICD-10-CM

## 2017-12-12 DIAGNOSIS — C50.812 MALIGNANT NEOPLASM OF OVERLAPPING SITES OF LEFT FEMALE BREAST, UNSPECIFIED ESTROGEN RECEPTOR STATUS (HCC): ICD-10-CM

## 2017-12-12 DIAGNOSIS — C50.911 BILATERAL MALIGNANT NEOPLASM OF BREAST IN FEMALE, ESTROGEN RECEPTOR POSITIVE, UNSPECIFIED SITE OF BREAST (HCC): ICD-10-CM

## 2017-12-12 DIAGNOSIS — C50.912 BILATERAL MALIGNANT NEOPLASM OF BREAST IN FEMALE, ESTROGEN RECEPTOR POSITIVE, UNSPECIFIED SITE OF BREAST (HCC): ICD-10-CM

## 2017-12-12 LAB
CANCER AG15-3 SERPL-ACNC: 12.4 U/ML (ref 0–25)
CANCER AG27-29 SERPL-ACNC: 14.7 U/ML (ref 0–38.6)

## 2017-12-12 PROCEDURE — 25010000002 ONDANSETRON PER 1 MG: Performed by: NURSE ANESTHETIST, CERTIFIED REGISTERED

## 2017-12-12 PROCEDURE — 19301 PARTIAL MASTECTOMY: CPT | Performed by: SURGERY

## 2017-12-12 PROCEDURE — 25010000003 CEFAZOLIN PER 500 MG: Performed by: SURGERY

## 2017-12-12 PROCEDURE — A4648 IMPLANTABLE TISSUE MARKER: HCPCS | Performed by: SURGERY

## 2017-12-12 PROCEDURE — 38525 BIOPSY/REMOVAL LYMPH NODES: CPT | Performed by: SURGERY

## 2017-12-12 PROCEDURE — 25010000002 PROPOFOL 10 MG/ML EMULSION: Performed by: NURSE ANESTHETIST, CERTIFIED REGISTERED

## 2017-12-12 PROCEDURE — 19082 BX BREAST ADD LESION STRTCTC: CPT | Performed by: RADIOLOGY

## 2017-12-12 PROCEDURE — 38792 RA TRACER ID OF SENTINL NODE: CPT

## 2017-12-12 PROCEDURE — 38900 IO MAP OF SENT LYMPH NODE: CPT | Performed by: SURGERY

## 2017-12-12 PROCEDURE — 25010000002 MIDAZOLAM PER 1 MG: Performed by: NURSE ANESTHETIST, CERTIFIED REGISTERED

## 2017-12-12 PROCEDURE — 25010000002 FENTANYL CITRATE (PF) 100 MCG/2ML SOLUTION: Performed by: NURSE ANESTHETIST, CERTIFIED REGISTERED

## 2017-12-12 PROCEDURE — 88342 IMHCHEM/IMCYTCHM 1ST ANTB: CPT | Performed by: SURGERY

## 2017-12-12 PROCEDURE — 19081 BX BREAST 1ST LESION STRTCTC: CPT | Performed by: RADIOLOGY

## 2017-12-12 PROCEDURE — 25010000002 MORPHINE PER 10 MG: Performed by: SURGERY

## 2017-12-12 RX ORDER — FENTANYL CITRATE 50 UG/ML
INJECTION, SOLUTION INTRAMUSCULAR; INTRAVENOUS AS NEEDED
Status: DISCONTINUED | OUTPATIENT
Start: 2017-12-12 | End: 2017-12-12 | Stop reason: SURG

## 2017-12-12 RX ORDER — FAMOTIDINE 10 MG/ML
INJECTION, SOLUTION INTRAVENOUS AS NEEDED
Status: DISCONTINUED | OUTPATIENT
Start: 2017-12-12 | End: 2017-12-12 | Stop reason: SURG

## 2017-12-12 RX ORDER — ONDANSETRON 2 MG/ML
4 INJECTION INTRAMUSCULAR; INTRAVENOUS ONCE AS NEEDED
Status: DISCONTINUED | OUTPATIENT
Start: 2017-12-12 | End: 2017-12-12 | Stop reason: HOSPADM

## 2017-12-12 RX ORDER — PROPOFOL 10 MG/ML
VIAL (ML) INTRAVENOUS AS NEEDED
Status: DISCONTINUED | OUTPATIENT
Start: 2017-12-12 | End: 2017-12-12 | Stop reason: SURG

## 2017-12-12 RX ORDER — OXYCODONE HYDROCHLORIDE AND ACETAMINOPHEN 5; 325 MG/1; MG/1
1 TABLET ORAL EVERY 6 HOURS PRN
Qty: 28 TABLET | Refills: 0 | Status: SHIPPED | OUTPATIENT
Start: 2017-12-12 | End: 2018-06-19

## 2017-12-12 RX ORDER — ONDANSETRON 2 MG/ML
4 INJECTION INTRAMUSCULAR; INTRAVENOUS EVERY 4 HOURS PRN
Status: DISCONTINUED | OUTPATIENT
Start: 2017-12-12 | End: 2017-12-12 | Stop reason: HOSPADM

## 2017-12-12 RX ORDER — SODIUM CHLORIDE, SODIUM LACTATE, POTASSIUM CHLORIDE, CALCIUM CHLORIDE 600; 310; 30; 20 MG/100ML; MG/100ML; MG/100ML; MG/100ML
125 INJECTION, SOLUTION INTRAVENOUS CONTINUOUS
Status: DISCONTINUED | OUTPATIENT
Start: 2017-12-12 | End: 2017-12-12 | Stop reason: HOSPADM

## 2017-12-12 RX ORDER — LIDOCAINE HYDROCHLORIDE 20 MG/ML
INJECTION, SOLUTION EPIDURAL; INFILTRATION; INTRACAUDAL; PERINEURAL AS NEEDED
Status: DISCONTINUED | OUTPATIENT
Start: 2017-12-12 | End: 2017-12-12 | Stop reason: SURG

## 2017-12-12 RX ORDER — OXYCODONE HYDROCHLORIDE AND ACETAMINOPHEN 5; 325 MG/1; MG/1
1 TABLET ORAL ONCE AS NEEDED
Status: DISCONTINUED | OUTPATIENT
Start: 2017-12-12 | End: 2017-12-12 | Stop reason: HOSPADM

## 2017-12-12 RX ORDER — MEPERIDINE HYDROCHLORIDE 25 MG/ML
12.5 INJECTION INTRAMUSCULAR; INTRAVENOUS; SUBCUTANEOUS
Status: DISCONTINUED | OUTPATIENT
Start: 2017-12-12 | End: 2017-12-12 | Stop reason: HOSPADM

## 2017-12-12 RX ORDER — ONDANSETRON 2 MG/ML
INJECTION INTRAMUSCULAR; INTRAVENOUS AS NEEDED
Status: DISCONTINUED | OUTPATIENT
Start: 2017-12-12 | End: 2017-12-12 | Stop reason: SURG

## 2017-12-12 RX ORDER — MIDAZOLAM HYDROCHLORIDE 1 MG/ML
INJECTION INTRAMUSCULAR; INTRAVENOUS AS NEEDED
Status: DISCONTINUED | OUTPATIENT
Start: 2017-12-12 | End: 2017-12-12 | Stop reason: SURG

## 2017-12-12 RX ORDER — HYDROCODONE BITARTRATE AND ACETAMINOPHEN 7.5; 325 MG/1; MG/1
1 TABLET ORAL EVERY 4 HOURS PRN
Status: DISCONTINUED | OUTPATIENT
Start: 2017-12-12 | End: 2017-12-12 | Stop reason: HOSPADM

## 2017-12-12 RX ORDER — MAGNESIUM HYDROXIDE 1200 MG/15ML
LIQUID ORAL AS NEEDED
Status: DISCONTINUED | OUTPATIENT
Start: 2017-12-12 | End: 2017-12-12 | Stop reason: HOSPADM

## 2017-12-12 RX ORDER — ISOSULFAN BLUE 50 MG/5ML
INJECTION, SOLUTION SUBCUTANEOUS AS NEEDED
Status: DISCONTINUED | OUTPATIENT
Start: 2017-12-12 | End: 2017-12-12 | Stop reason: HOSPADM

## 2017-12-12 RX ORDER — SODIUM CHLORIDE 0.9 % (FLUSH) 0.9 %
1-10 SYRINGE (ML) INJECTION AS NEEDED
Status: DISCONTINUED | OUTPATIENT
Start: 2017-12-12 | End: 2017-12-12 | Stop reason: HOSPADM

## 2017-12-12 RX ORDER — FENTANYL CITRATE 50 UG/ML
50 INJECTION, SOLUTION INTRAMUSCULAR; INTRAVENOUS
Status: DISCONTINUED | OUTPATIENT
Start: 2017-12-12 | End: 2017-12-12 | Stop reason: HOSPADM

## 2017-12-12 RX ORDER — IPRATROPIUM BROMIDE AND ALBUTEROL SULFATE 2.5; .5 MG/3ML; MG/3ML
3 SOLUTION RESPIRATORY (INHALATION) ONCE AS NEEDED
Status: DISCONTINUED | OUTPATIENT
Start: 2017-12-12 | End: 2017-12-12 | Stop reason: HOSPADM

## 2017-12-12 RX ADMIN — CEFAZOLIN SODIUM 2 G: 2 SOLUTION INTRAVENOUS at 10:42

## 2017-12-12 RX ADMIN — SODIUM CHLORIDE, POTASSIUM CHLORIDE, SODIUM LACTATE AND CALCIUM CHLORIDE 125 ML/HR: 600; 310; 30; 20 INJECTION, SOLUTION INTRAVENOUS at 10:20

## 2017-12-12 RX ADMIN — FENTANYL CITRATE 25 MCG: 50 INJECTION INTRAMUSCULAR; INTRAVENOUS at 11:14

## 2017-12-12 RX ADMIN — MORPHINE SULFATE 4 MG: 4 INJECTION, SOLUTION INTRAMUSCULAR; INTRAVENOUS at 12:50

## 2017-12-12 RX ADMIN — LIDOCAINE HYDROCHLORIDE 60 MG: 20 INJECTION, SOLUTION EPIDURAL; INFILTRATION; INTRACAUDAL; PERINEURAL at 10:51

## 2017-12-12 RX ADMIN — FENTANYL CITRATE 25 MCG: 50 INJECTION INTRAMUSCULAR; INTRAVENOUS at 11:37

## 2017-12-12 RX ADMIN — FENTANYL CITRATE 50 MCG: 50 INJECTION INTRAMUSCULAR; INTRAVENOUS at 10:47

## 2017-12-12 RX ADMIN — PROPOFOL 60 MG: 10 INJECTION, EMULSION INTRAVENOUS at 10:51

## 2017-12-12 RX ADMIN — FENTANYL CITRATE 25 MCG: 50 INJECTION INTRAMUSCULAR; INTRAVENOUS at 11:32

## 2017-12-12 RX ADMIN — MIDAZOLAM HYDROCHLORIDE 2 MG: 1 INJECTION, SOLUTION INTRAMUSCULAR; INTRAVENOUS at 10:42

## 2017-12-12 RX ADMIN — FENTANYL CITRATE 25 MCG: 50 INJECTION INTRAMUSCULAR; INTRAVENOUS at 11:19

## 2017-12-12 RX ADMIN — FENTANYL CITRATE 50 MCG: 50 INJECTION INTRAMUSCULAR; INTRAVENOUS at 10:44

## 2017-12-12 RX ADMIN — FAMOTIDINE 20 MG: 10 INJECTION, SOLUTION INTRAVENOUS at 10:54

## 2017-12-12 RX ADMIN — ONDANSETRON 4 MG: 2 INJECTION, SOLUTION INTRAMUSCULAR; INTRAVENOUS at 10:42

## 2017-12-12 RX ADMIN — EPHEDRINE SULFATE 10 MG: 50 INJECTION INTRAMUSCULAR; INTRAVENOUS; SUBCUTANEOUS at 11:03

## 2017-12-12 NOTE — ANESTHESIA PREPROCEDURE EVALUATION
Anesthesia Evaluation     Patient summary reviewed and Nursing notes reviewed   NPO Solid Status: > 8 hours  NPO Liquid Status: > 8 hours     Airway   Mallampati: III  TM distance: <3 FB  Neck ROM: limited  possible difficult intubation  Dental - normal exam   (+) lower dentures and upper dentures    Pulmonary - negative pulmonary ROS and normal exam   Cardiovascular - negative cardio ROS and normal exam  Exercise tolerance: good (4-7 METS)    NYHA Classification: II        Neuro/Psych- negative ROS  GI/Hepatic/Renal/Endo - negative ROS     Musculoskeletal (-) negative ROS    Abdominal  - normal exam    Bowel sounds: normal.   Substance History - negative use     OB/GYN negative ob/gyn ROS         Other                                        Anesthesia Plan    ASA 3     general     intravenous and inhalational induction   Anesthetic plan and risks discussed with patient.  Use of blood products discussed with patient  Consented to blood products.   Plan discussed with CRNA.

## 2017-12-12 NOTE — H&P (VIEW-ONLY)
Subjective   Sirisha Valdez is a 72 y.o. female is being seen for consultation today at the request of Dr. James for breast cancer.    History of Present Illness  Ms. Valdez was seen in the office today for her new diagnosis of breast cancer. This is a 72 year female who underwent a screening mammogram with tomosynthesis on 10/16/17 which demonstrated architectural distortion of both breasts.  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  The patient denies palpable mass or nipple discharge.  There is a remote history of a benign left breast biopsy.  As far as risk factors go, Monica was 20 at the time that she had her first child, with an onset of menses at age 16.  There is no family history of breast or ovarian cancer.  The patient is postmenopausal and not on hormone replacement therapy.  Allergies   Allergen Reactions   • Promethazine      hypotension     Current Outpatient Prescriptions   Medication Sig Dispense Refill   • aspirin 81 MG EC tablet Take 81 mg by mouth daily.     • cilostazol (PLETAL) 50 MG tablet Take 1 tablet by mouth 2 (Two) Times a Day. 60 tablet 6   • diazepam (VALIUM) 10 MG tablet Take 10 mg by mouth 2 (two) times a day.     • HYDROcodone-acetaminophen (NORCO) 7.5-325 MG per tablet Take 1-2 tablets by mouth Every 4 (Four) Hours As Needed for moderate pain (4-6) (Pain) for up to 24 doses. 24 tablet 0   • metoprolol tartrate (LOPRESSOR) 50 MG tablet Take 50 mg by mouth 2 (two) times a day.     • nitroglycerin (NITROSTAT) 0.4 MG SL tablet Place 0.4 mg under the tongue Every 5 (Five) Minutes As Needed for Chest Pain. Take no more than 3 doses in 15 minutes.     • rosuvastatin (CRESTOR) 40 MG tablet Take 40 mg by mouth Every Night.  5   • aspirin  MG tablet Take 325 mg by mouth Every 6 (Six) Hours As Needed.     • benzonatate (TESSALON) 200 MG capsule Take 200 mg  "by mouth 3 (Three) Times a Day As Needed for cough.     • LYRICA 100 MG capsule   2     No current facility-administered medications for this visit.      Past Medical History:   Diagnosis Date   • ACD (adult celiac disease)    • Arthritis    • Breast cancer    • Coronary artery disease     3 HEART STENTS   • Gastroesophageal reflux    • Glaucoma    • Hypertension    • Migraine    • Peripheral arterial disease     KIM LEG STENTS       ANDFEM POP   • Peripheral vascular disease    • SSS (sick sinus syndrome)    • Status post placement of cardiac pacemaker      Past Surgical History:   Procedure Laterality Date   • BREAST SURGERY      Mass Removal.   • CARDIAC CATHETERIZATION     • CARPAL TUNNEL RELEASE     • CATARACT EXTRACTION     • FEMORAL DISTAL BYPASS     • HIP SURGERY     • HYSTERECTOMY     • PACEMAKER IMPLANTATION     • SHOULDER ARTHROSCOPY W/ ROTATOR CUFF REPAIR Left 3/8/2017    Procedure: LEFT SHOULDER ARTHROSCOPY, ACROMIOPLASTY, SUBACROMIC DEBRIDMENT;  Surgeon: Tesfaye Polo MD;  Location: [facility];  Service:    • SHOULDER SURGERY       Review of Systems  General: chills  Integumentary: negative  Eyes: eyesight problems, glasses, red eyes, eyes itch  ENT: negative  Respiratory: pneumonia  Gastrointestinal: diarrhea  Cardiovascular: negative  Neurological: negative  Psychiatric: depression and mood swings  Hematologic/Lymphatic: easy bleeding and easy bruising  Genitourinary: negative  Musculoskeletal: negative  Endocrine: negative  Breasts: negative    The following portions of the patient's history were reviewed and updated as appropriate: allergies, current medications, past family history, past medical history, past social history, past surgical history and problem list.    Objective   /69 (BP Location: Left arm, Patient Position: Lying)  Pulse 80  Ht 63\" (160 cm)  Wt 139 lb 3.2 oz (63.1 kg)  BMI 24.66 kg/m2  Physical Exam  General:  This is a WD WN white female in no acute " distress  Vital signs stable, afebrile  HEENT exam:  WNL. Sclera are anicteric.  EOMI  Neck:  supple, FROM.  No JVD.  Trachea midline  Lungs:  Respiratory effort normal. Auscultation: Coarse, bilateral expiratory rhonchi  Heart:  Regular rate and rhythm, without murmur, gallop, rub.  No pedal edema  Breasts: On visual inspection the breasts are symmetrical. Examination of the right breast demonstrates no discrete mass, skin change, or axillary adenopathy.  Examination of the left breast demonstrates no discrete mass, skin change, or axillary adenopathy.  Abdomen: Nontender, without hepatosplenomegaly  Musculoskeletal:  muscle strength/tone is normal.    Psyc:  alert, oriented x 3.  Mood and affect are appropriate  skin:  Warm with good turgor.  Without rash or lesion  extremities:  Examination of the extremities revealed no cyanosis, clubbing or edema.    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  Grade 1 infiltrating ductal carcinoma of the left breast    Plan: Proceed with right lumpectomy with needle localization and left lumpectomy with needle localization and sentinel lymph node biopsy, possible axillary node dissection       Discussion/Summary: As the patient is followed by cardiology she will require cardiology clearance.  The risks of the procedure were discussed with the patient including the possible need for additional surgery.  She is also aware that if the sentinel node is positive she will require an axillary node dissection.  She is aware that mastectomy is an option.  She is aware that she may or may not require postoperative radiation.    Errors in dictation may reflect use of voice recognition software and not all errors in transcription may have been detected prior to signing.    Future Appointments  Date  Time Provider Department Center   12/7/2017 11:40 AM Jose F Ingram MD [facility] None       Subjective   Sirisha Valdez is a 72 y.o. female is being seen for consultation today at the request of Dr. James for breast cancer.    History of Present Illness  Ms. Valdez was seen in the office today for her new diagnosis of breast cancer  Allergies   Allergen Reactions   • Promethazine      hypotension     Current Outpatient Prescriptions   Medication Sig Dispense Refill   • aspirin 81 MG EC tablet Take 81 mg by mouth daily.     • cilostazol (PLETAL) 50 MG tablet Take 1 tablet by mouth 2 (Two) Times a Day. 60 tablet 6   • diazepam (VALIUM) 10 MG tablet Take 10 mg by mouth 2 (two) times a day.     • HYDROcodone-acetaminophen (NORCO) 7.5-325 MG per tablet Take 1-2 tablets by mouth Every 4 (Four) Hours As Needed for moderate pain (4-6) (Pain) for up to 24 doses. 24 tablet 0   • metoprolol tartrate (LOPRESSOR) 50 MG tablet Take 50 mg by mouth 2 (two) times a day.     • nitroglycerin (NITROSTAT) 0.4 MG SL tablet Place 0.4 mg under the tongue Every 5 (Five) Minutes As Needed for Chest Pain. Take no more than 3 doses in 15 minutes.     • rosuvastatin (CRESTOR) 40 MG tablet Take 40 mg by mouth Every Night.  5   • aspirin  MG tablet Take 325 mg by mouth Every 6 (Six) Hours As Needed.     • benzonatate (TESSALON) 200 MG capsule Take 200 mg by mouth 3 (Three) Times a Day As Needed for cough.     • LYRICA 100 MG capsule   2     No current facility-administered medications for this visit.      Past Medical History:   Diagnosis Date   • ACD (adult celiac disease)    • Arthritis    • Breast cancer    • Coronary artery disease     3 HEART STENTS   • Gastroesophageal reflux    • Glaucoma    • Hypertension    • Migraine    • Peripheral arterial disease     KIM LEG STENTS       ANDFEM POP   • Peripheral vascular disease    • SSS (sick sinus syndrome)    • Status post placement of cardiac pacemaker      Past  "Surgical History:   Procedure Laterality Date   • BREAST SURGERY      Mass Removal.   • CARDIAC CATHETERIZATION     • CARPAL TUNNEL RELEASE     • CATARACT EXTRACTION     • FEMORAL DISTAL BYPASS     • HIP SURGERY     • HYSTERECTOMY     • PACEMAKER IMPLANTATION     • SHOULDER ARTHROSCOPY W/ ROTATOR CUFF REPAIR Left 3/8/2017    Procedure: LEFT SHOULDER ARTHROSCOPY, ACROMIOPLASTY, SUBACROMIC DEBRIDMENT;  Surgeon: Tesfaye Polo MD;  Location: [facility];  Service:    • SHOULDER SURGERY       Review of Systems  General: chills  Integumentary: negative  Eyes: eyesight problems, glasses, red eyes, eyes itch  ENT: negative  Respiratory: pneumonia  Gastrointestinal: diarrhea  Cardiovascular: negative  Neurological: negative  Psychiatric: depression and mood swings  Hematologic/Lymphatic: easy bleeding and easy bruising  Genitourinary: negative  Musculoskeletal: negative  Endocrine: negative  Breasts: negative    The following portions of the patient's history were reviewed and updated as appropriate: allergies, current medications, past family history, past medical history, past social history, past surgical history and problem list.    Objective   /69 (BP Location: Left arm, Patient Position: Lying)  Pulse 80  Ht 63\" (160 cm)  Wt 139 lb 3.2 oz (63.1 kg)  BMI 24.66 kg/m2  Physical Exam  General:  This is a WD WN white female in no acute distress  Vital signs stable, afebrile  HEENT exam:  WNL. Sclera are anicteric.  EOMI  Neck:  supple, FROM.  No JVD.  Trachea midline  Lungs:  Respiratory effort normal. Auscultation: Coarse, bilateral expiratory rhonchi  Heart:  Regular rate and rhythm, without murmur, gallop, rub.  No pedal edema  Breasts: On visual inspection the breasts are symmetrical. Examination of the right breast demonstrates no discrete mass, skin change, or axillary adenopathy.  Examination of the left breast demonstrates no discrete mass, skin change, or axillary adenopathy.  Abdomen: " Nontender, without hepatosplenomegaly  Musculoskeletal:  muscle strength/tone is normal.    Psyc:  alert, oriented x 3.  Mood and affect are appropriate  skin:  Warm with good turgor.  Without rash or lesion  extremities:  Examination of the extremities revealed no cyanosis, clubbing or edema.    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  Grade 1 infiltrating ductal carcinoma of the left breast    Plan: Proceed with right lumpectomy with needle localization and left lumpectomy with needle localization and sentinel lymph node biopsy, possible axillary node dissection       Discussion/Summary: As the patient is followed by cardiology she will require cardiology clearance.  The risks of the procedure were discussed with the patient including the possible need for additional surgery.  She is also aware that if the sentinel node is positive she will require an axillary node dissection.  She is aware that mastectomy is an option.  She is aware that she may or may not require postoperative radiation.    Errors in dictation may reflect use of voice recognition software and not all errors in transcription may have been detected prior to signing.    Future Appointments  Date Time Provider Department Center   12/7/2017 11:40 AM Jose F Ingram MD [facility] None     This document has been electronically signed by Lida CARMONA MD on December 12, 2017 6:15 AM

## 2017-12-12 NOTE — H&P
Subjective   Sirisha Valdez is a 72 y.o. female is being seen for consultation today at the request of Dr. James for breast cancer.    History of Present Illness  Ms. Valdez was seen in the office today for her new diagnosis of breast cancer. This is a 72 year female who underwent a screening mammogram with tomosynthesis on 10/16/17 which demonstrated architectural distortion of both breasts.  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  The patient denies palpable mass or nipple discharge.  There is a remote history of a benign left breast biopsy.  As far as risk factors go, Monica was 20 at the time that she had her first child, with an onset of menses at age 16.  There is no family history of breast or ovarian cancer.  The patient is postmenopausal and not on hormone replacement therapy.  Allergies   Allergen Reactions   • Promethazine      hypotension     Current Outpatient Prescriptions   Medication Sig Dispense Refill   • aspirin 81 MG EC tablet Take 81 mg by mouth daily.     • cilostazol (PLETAL) 50 MG tablet Take 1 tablet by mouth 2 (Two) Times a Day. 60 tablet 6   • diazepam (VALIUM) 10 MG tablet Take 10 mg by mouth 2 (two) times a day.     • HYDROcodone-acetaminophen (NORCO) 7.5-325 MG per tablet Take 1-2 tablets by mouth Every 4 (Four) Hours As Needed for moderate pain (4-6) (Pain) for up to 24 doses. 24 tablet 0   • metoprolol tartrate (LOPRESSOR) 50 MG tablet Take 50 mg by mouth 2 (two) times a day.     • nitroglycerin (NITROSTAT) 0.4 MG SL tablet Place 0.4 mg under the tongue Every 5 (Five) Minutes As Needed for Chest Pain. Take no more than 3 doses in 15 minutes.     • rosuvastatin (CRESTOR) 40 MG tablet Take 40 mg by mouth Every Night.  5   • aspirin  MG tablet Take 325 mg by mouth Every 6 (Six) Hours As Needed.     • benzonatate (TESSALON) 200 MG capsule Take 200 mg  "by mouth 3 (Three) Times a Day As Needed for cough.     • LYRICA 100 MG capsule   2     No current facility-administered medications for this visit.      Past Medical History:   Diagnosis Date   • ACD (adult celiac disease)    • Arthritis    • Breast cancer    • Coronary artery disease     3 HEART STENTS   • Gastroesophageal reflux    • Glaucoma    • Hypertension    • Migraine    • Peripheral arterial disease     KIM LEG STENTS       ANDFEM POP   • Peripheral vascular disease    • SSS (sick sinus syndrome)    • Status post placement of cardiac pacemaker      Past Surgical History:   Procedure Laterality Date   • BREAST SURGERY      Mass Removal.   • CARDIAC CATHETERIZATION     • CARPAL TUNNEL RELEASE     • CATARACT EXTRACTION     • FEMORAL DISTAL BYPASS     • HIP SURGERY     • HYSTERECTOMY     • PACEMAKER IMPLANTATION     • SHOULDER ARTHROSCOPY W/ ROTATOR CUFF REPAIR Left 3/8/2017    Procedure: LEFT SHOULDER ARTHROSCOPY, ACROMIOPLASTY, SUBACROMIC DEBRIDMENT;  Surgeon: Tesfaye Polo MD;  Location: [facility];  Service:    • SHOULDER SURGERY       Review of Systems  General: chills  Integumentary: negative  Eyes: eyesight problems, glasses, red eyes, eyes itch  ENT: negative  Respiratory: pneumonia  Gastrointestinal: diarrhea  Cardiovascular: negative  Neurological: negative  Psychiatric: depression and mood swings  Hematologic/Lymphatic: easy bleeding and easy bruising  Genitourinary: negative  Musculoskeletal: negative  Endocrine: negative  Breasts: negative    The following portions of the patient's history were reviewed and updated as appropriate: allergies, current medications, past family history, past medical history, past social history, past surgical history and problem list.    Objective   /69 (BP Location: Left arm, Patient Position: Lying)  Pulse 80  Ht 63\" (160 cm)  Wt 139 lb 3.2 oz (63.1 kg)  BMI 24.66 kg/m2  Physical Exam  General:  This is a WD WN white female in no acute " distress  Vital signs stable, afebrile  HEENT exam:  WNL. Sclera are anicteric.  EOMI  Neck:  supple, FROM.  No JVD.  Trachea midline  Lungs:  Respiratory effort normal. Auscultation: Coarse, bilateral expiratory rhonchi  Heart:  Regular rate and rhythm, without murmur, gallop, rub.  No pedal edema  Breasts: On visual inspection the breasts are symmetrical. Examination of the right breast demonstrates no discrete mass, skin change, or axillary adenopathy.  Examination of the left breast demonstrates no discrete mass, skin change, or axillary adenopathy.  Abdomen: Nontender, without hepatosplenomegaly  Musculoskeletal:  muscle strength/tone is normal.    Psyc:  alert, oriented x 3.  Mood and affect are appropriate  skin:  Warm with good turgor.  Without rash or lesion  extremities:  Examination of the extremities revealed no cyanosis, clubbing or edema.    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  Grade 1 infiltrating ductal carcinoma of the left breast    Plan: Proceed with right lumpectomy with needle localization and left lumpectomy with needle localization and sentinel lymph node biopsy, possible axillary node dissection       Discussion/Summary: As the patient is followed by cardiology she will require cardiology clearance.  The risks of the procedure were discussed with the patient including the possible need for additional surgery.  She is also aware that if the sentinel node is positive she will require an axillary node dissection.  She is aware that mastectomy is an option.  She is aware that she may or may not require postoperative radiation.    Errors in dictation may reflect use of voice recognition software and not all errors in transcription may have been detected prior to signing.    Future Appointments  Date  Time Provider Department Center   12/7/2017 11:40 AM Jose F Ingram MD [facility] None       Subjective   Sirisha Valdez is a 72 y.o. female is being seen for consultation today at the request of Dr. James for breast cancer.    History of Present Illness  Ms. Valdez was seen in the office today for her new diagnosis of breast cancer  Allergies   Allergen Reactions   • Promethazine      hypotension     Current Outpatient Prescriptions   Medication Sig Dispense Refill   • aspirin 81 MG EC tablet Take 81 mg by mouth daily.     • cilostazol (PLETAL) 50 MG tablet Take 1 tablet by mouth 2 (Two) Times a Day. 60 tablet 6   • diazepam (VALIUM) 10 MG tablet Take 10 mg by mouth 2 (two) times a day.     • HYDROcodone-acetaminophen (NORCO) 7.5-325 MG per tablet Take 1-2 tablets by mouth Every 4 (Four) Hours As Needed for moderate pain (4-6) (Pain) for up to 24 doses. 24 tablet 0   • metoprolol tartrate (LOPRESSOR) 50 MG tablet Take 50 mg by mouth 2 (two) times a day.     • nitroglycerin (NITROSTAT) 0.4 MG SL tablet Place 0.4 mg under the tongue Every 5 (Five) Minutes As Needed for Chest Pain. Take no more than 3 doses in 15 minutes.     • rosuvastatin (CRESTOR) 40 MG tablet Take 40 mg by mouth Every Night.  5   • aspirin  MG tablet Take 325 mg by mouth Every 6 (Six) Hours As Needed.     • benzonatate (TESSALON) 200 MG capsule Take 200 mg by mouth 3 (Three) Times a Day As Needed for cough.     • LYRICA 100 MG capsule   2     No current facility-administered medications for this visit.      Past Medical History:   Diagnosis Date   • ACD (adult celiac disease)    • Arthritis    • Breast cancer    • Coronary artery disease     3 HEART STENTS   • Gastroesophageal reflux    • Glaucoma    • Hypertension    • Migraine    • Peripheral arterial disease     KIM LEG STENTS       ANDFEM POP   • Peripheral vascular disease    • SSS (sick sinus syndrome)    • Status post placement of cardiac pacemaker      Past  "Surgical History:   Procedure Laterality Date   • BREAST SURGERY      Mass Removal.   • CARDIAC CATHETERIZATION     • CARPAL TUNNEL RELEASE     • CATARACT EXTRACTION     • FEMORAL DISTAL BYPASS     • HIP SURGERY     • HYSTERECTOMY     • PACEMAKER IMPLANTATION     • SHOULDER ARTHROSCOPY W/ ROTATOR CUFF REPAIR Left 3/8/2017    Procedure: LEFT SHOULDER ARTHROSCOPY, ACROMIOPLASTY, SUBACROMIC DEBRIDMENT;  Surgeon: Tesfaye Polo MD;  Location: [facility];  Service:    • SHOULDER SURGERY       Review of Systems  General: chills  Integumentary: negative  Eyes: eyesight problems, glasses, red eyes, eyes itch  ENT: negative  Respiratory: pneumonia  Gastrointestinal: diarrhea  Cardiovascular: negative  Neurological: negative  Psychiatric: depression and mood swings  Hematologic/Lymphatic: easy bleeding and easy bruising  Genitourinary: negative  Musculoskeletal: negative  Endocrine: negative  Breasts: negative    The following portions of the patient's history were reviewed and updated as appropriate: allergies, current medications, past family history, past medical history, past social history, past surgical history and problem list.    Objective   /69 (BP Location: Left arm, Patient Position: Lying)  Pulse 80  Ht 63\" (160 cm)  Wt 139 lb 3.2 oz (63.1 kg)  BMI 24.66 kg/m2  Physical Exam  General:  This is a WD WN white female in no acute distress  Vital signs stable, afebrile  HEENT exam:  WNL. Sclera are anicteric.  EOMI  Neck:  supple, FROM.  No JVD.  Trachea midline  Lungs:  Respiratory effort normal. Auscultation: Coarse, bilateral expiratory rhonchi  Heart:  Regular rate and rhythm, without murmur, gallop, rub.  No pedal edema  Breasts: On visual inspection the breasts are symmetrical. Examination of the right breast demonstrates no discrete mass, skin change, or axillary adenopathy.  Examination of the left breast demonstrates no discrete mass, skin change, or axillary adenopathy.  Abdomen: " Nontender, without hepatosplenomegaly  Musculoskeletal:  muscle strength/tone is normal.    Psyc:  alert, oriented x 3.  Mood and affect are appropriate  skin:  Warm with good turgor.  Without rash or lesion  extremities:  Examination of the extremities revealed no cyanosis, clubbing or edema.    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  Grade 1 infiltrating ductal carcinoma of the left breast    Plan: Proceed with right lumpectomy with needle localization and left lumpectomy with needle localization and sentinel lymph node biopsy, possible axillary node dissection       Discussion/Summary: As the patient is followed by cardiology she will require cardiology clearance.  The risks of the procedure were discussed with the patient including the possible need for additional surgery.  She is also aware that if the sentinel node is positive she will require an axillary node dissection.  She is aware that mastectomy is an option.  She is aware that she may or may not require postoperative radiation.    Errors in dictation may reflect use of voice recognition software and not all errors in transcription may have been detected prior to signing.    Future Appointments  Date Time Provider Department Center   12/7/2017 11:40 AM Jose F Ingram MD [facility] None     This document has been electronically signed by Lida CARMONA MD on December 12, 2017 6:15 AM

## 2017-12-12 NOTE — ANESTHESIA POSTPROCEDURE EVALUATION
Patient: Sirsiha STEVENSON Valdez    Procedure Summary     Date Anesthesia Start Anesthesia Stop Room / Location    12/12/17 1042 1235  [facility] 02 / [facility]       Procedure Diagnosis Surgeon Provider    Left BREAST LUMPECTOMY WITH SENTINEL NODE BIOPSY AND possible AXILLARY NODE DISSECTION.  Isotope and Lymphazurin injection left breast. Left breast preoperative needle localization (Left Breast); INTRAOPERATIVE ULTRASOUND (Left ); BREAST LUMPECTOMY WITH NEEDLE LOCALIZATION (Right Breast) Bilateral malignant neoplasm of breast in female, estrogen receptor positive, unspecified site of breast  (Bilateral malignant neoplasm of breast in female, estrogen receptor positive, unspecified site of breast [C50.911, Z17.0, C50.912]) MD Ryan Stoddard,           Anesthesia Type: general  Last vitals  BP   115/55 (12/12/17 0958)   Temp   97.6 °F (36.4 °C) (12/12/17 0958)   Pulse    88   Resp   16 (12/12/17 0958)     SpO2   94 % (12/12/17 0958)     Post Anesthesia Care and Evaluation    Patient location during evaluation: PACU  Patient participation: complete - patient participated  Level of consciousness: awake and alert  Pain score: 1  Pain management: adequate  Airway patency: patent  Anesthetic complications: No anesthetic complications  PONV Status: controlled  Cardiovascular status: acceptable  Respiratory status: acceptable  Hydration status: acceptable

## 2017-12-12 NOTE — OP NOTE
Left breast isotope and Lymphazurin injection, left sentinel lymph node biopsy with frozen section, left lumpectomy with preoperative needle localization and specimen x-ray, right breast lumpectomy with preop needle localization and specimen x-ray    Pre-op:  DCIS of the right breast, invasive ductal carcinoma of the left breast    Post-op:  same       Indications:      DCIS of the right breast, invasive ductal carcinoma of the left breast  Procedure Details   After obtaining informed consent and receiving preoperative antibiotics and with venous compression boots in place, the patient was taken to the operating room and placed under anesthesia.  Patient had preoperative localization wires placed in the right and left breast.  After prep of the skin with alcohol the isotope was injected in the subdermal space of the left nipple areolar complex.  After massaging the breast 5 cc of Lymphazurin blue was injected into the subcutaneous areola lymphatic plexus.  The left and right breast and axilla were prepped and draped in a sterile fashion.  The left axilla sentinel node biopsy was performed first.  An incision was made parallel to the lateral aspect of pectoralis major muscle and carried down into the axillary tissue.  With use of the visual of the blue dye and the navigator probe a total of 4 sentinel lymph nodes were identified.  These were sent for frozen section.  Hemostasis was obtained.  The incision was closed in a 2 layer closure of 3. 0 Vicryl sutures to Shelly's fascia followed by 4. 0 Vicryl subcuticular stitch.  Gloves and instruments were changed and attention was turned to the left lumpectomy.  After reviewing the specimen x-rays was felt that the area of concern was in the 2 o'clock position.  A radial incision over this was made and carried down into the breast tissue.  The area was then dissected circumferentially around where it was felt the lesion was.  The area was removed and sent for specimen  x-ray which did confirm the area of concern to be present within the specimen.  After obtaining hemostasis the breast was mobilized along the pectoralis fashion in order to allow for closure of the deep space.  This was closed with 0 Vicryl sutures.  The skin was closed with 3. 0 Vicryl subdermal sutures followed by 4. 0 Vicryl subcuticular stitch.  Attention was then turned to the right breast.  The lesion was in the 11 o'clock position.  This was excised in a similar fashion.  During the dissection what was felt to be the residual of the biopsy clip was visually identified.  After removing the specimen the specimen x-ray did not clearly demonstrate the presence of the clip.  Was felt this was likely suctioned away during the procedure.  The area of clinical concern however was excised.  After obtaining hemostasis the incision was closed with deep 0 Vicryl sutures, followed by 3-0 Vicryl subdermal and subcutaneous sutures, followed by a 4. 0 Vicryl subcuticular stitch.  All sentinel lymph nodes returned negative for malignancy.  Areas were injected with half percent Marcaine with epinephrine.  Steri-Strips, dressing, Surgi-Bra were placed.  Patient tolerated the procedure well and was taken to the recovery room in stable condition.    Findings:      Estimated Blood Loss:  Minimal           Drains: none           Specimens:   ID Type Source Tests Collected by Time Destination   A : #1 Tissue Francis Lymph Node TISSUE EXAM Lida Babin MD 12/12/2017 1108    B : #2  Tissue Francis Lymph Node TISSUE EXAM Lida Babin MD 12/12/2017 1110    C : #3 Tissue Francis Lymph Node TISSUE EXAM Lida Babin MD 12/12/2017 1114    D : # 4 Tissue Francis Lymph Node TISSUE EXAM Lida Babin MD 12/12/2017 1121    E : Lumpectomy taken out at 1139 Tissue Breast, Left TISSUE EXAM Lida Babin MD 12/12/2017 1138    F : Lumpectomy taken out at 1201 Tissue Breast, Right TISSUE EXAM Lida Babin MD 12/12/2017  1151         Grafts and Implants: No       Complications:  none           Disposition: PACU - hemodynamically stable.           Condition: stable

## 2017-12-20 ENCOUNTER — OFFICE VISIT (OUTPATIENT)
Dept: SURGERY | Facility: CLINIC | Age: 72
End: 2017-12-20

## 2017-12-20 VITALS
HEIGHT: 63 IN | DIASTOLIC BLOOD PRESSURE: 72 MMHG | WEIGHT: 138.2 LBS | BODY MASS INDEX: 24.49 KG/M2 | SYSTOLIC BLOOD PRESSURE: 159 MMHG | HEART RATE: 88 BPM

## 2017-12-20 DIAGNOSIS — Z17.0 BILATERAL MALIGNANT NEOPLASM OF BREAST IN FEMALE, ESTROGEN RECEPTOR POSITIVE, UNSPECIFIED SITE OF BREAST (HCC): Primary | ICD-10-CM

## 2017-12-20 DIAGNOSIS — C50.912 BILATERAL MALIGNANT NEOPLASM OF BREAST IN FEMALE, ESTROGEN RECEPTOR POSITIVE, UNSPECIFIED SITE OF BREAST (HCC): Primary | ICD-10-CM

## 2017-12-20 DIAGNOSIS — C50.911 BILATERAL MALIGNANT NEOPLASM OF BREAST IN FEMALE, ESTROGEN RECEPTOR POSITIVE, UNSPECIFIED SITE OF BREAST (HCC): Primary | ICD-10-CM

## 2017-12-20 PROCEDURE — 99024 POSTOP FOLLOW-UP VISIT: CPT | Performed by: SURGERY

## 2017-12-20 NOTE — PROGRESS NOTES
Subjective   Sirisha Valdez is a 72 y.o. female here today for post-op breast care and pathology report.    History of Present Illness  Ms. Valdez was seen in the office today for her first postoperative visit following a left breast lumpectomy and sentinel lymph node biopsy and a right breast lumpectomy.  Final pathology demonstrated left T1bN0 infiltrating ductal carcinoma, ER positive, ND positive, HER-2 negative.  The anterior margin was positive for invasive tumor and other margins were positive for DCIS.  The right lumpectomy demonstrated DCIS, ER positive, ND positive with close but clear margins.  The patient has expected postoperative soreness but other wise voices no complaints.  Allergies   Allergen Reactions   • Promethazine      hypotension         Current Outpatient Prescriptions   Medication Sig Dispense Refill   • aspirin 81 MG EC tablet Take 81 mg by mouth daily.     • cilostazol (PLETAL) 50 MG tablet Take 1 tablet by mouth 2 (Two) Times a Day. 60 tablet 6   • diazepam (VALIUM) 10 MG tablet Take 10 mg by mouth 2 (two) times a day.     • HYDROcodone-acetaminophen (NORCO) 7.5-325 MG per tablet Take 1-2 tablets by mouth Every 4 (Four) Hours As Needed for moderate pain (4-6) (Pain) for up to 24 doses. 24 tablet 0   • metoprolol tartrate (LOPRESSOR) 50 MG tablet Take 50 mg by mouth 2 (two) times a day.     • nitroglycerin (NITROSTAT) 0.4 MG SL tablet Place 0.4 mg under the tongue Every 5 (Five) Minutes As Needed for Chest Pain. Take no more than 3 doses in 15 minutes.     • oxyCODONE-acetaminophen (PERCOCET) 5-325 MG per tablet Take 1 tablet by mouth Every 6 (Six) Hours As Needed for Moderate Pain 28 tablet 0   • rosuvastatin (CRESTOR) 40 MG tablet Take 40 mg by mouth Every Night.  5     No current facility-administered medications for this visit.        Objective   There were no vitals taken for this visit.   Physical Exam  General:  This is a WD WN white female in no acute distress  Vital signs stable,  afebrile  HEENT exam:  WNL. Sclera are anicteric.  EOMI  Neck:  supple, FROM.  No JVD.  Trachea midline  Lungs:  Respiratory effort normal. Auscultation: Coarse, bilateral expiratory rhonchi  Heart:  Regular rate and rhythm, without murmur, gallop, rub.  No pedal edema  Breasts: On visual inspection the breasts are symmetrical. Examination of the right breast demonstrates a healing surgical scar in the 11 to 12 o'clock position.  Examination of the left breast demonstrates a healing surgical scar in the breast and the axilla.  Abdomen: Nontender, without hepatosplenomegaly  Musculoskeletal:  muscle strength/tone is normal.    Psyc:  alert, oriented x 3.  Mood and affect are appropriate  skin:  Warm with good turgor.  Without rash or lesion  extremities:  Examination of the extremities revealed no cyanosis, clubbing or edema.  Results/Data  Pathology report was reviewed and discussed with the patient.    Procedures     Assessment/Plan     Low grade DCIS of the right breast.  Grade 1 infiltrating ductal carcinoma of the left breast    Plan: We'll proceed with left breast reexcision.  As the patient may well be a candidate to avoid radiation and as she is undergoing anesthesia anyway I am going to re-excise the close margins on the right side.       Discussion/Summary: Risks of the procedure were discussed with the patient who wishes to proceed    Errors in dictation may reflect use of voice recognition software and not all errors in transcription may have been detected prior to signing.    Future Appointments  Date Time Provider Department Center   1/18/2018 11:40 AM Jose F Ingram MD [facility] None

## 2017-12-23 NOTE — H&P
Subjective   Sirisha Valdez is a 72 y.o. female here today for post-op breast care and pathology report.    History of Present Illness  Ms. Valdez was seen in the office today for her first postoperative visit following a left breast lumpectomy and sentinel lymph node biopsy and a right breast lumpectomy.  Final pathology demonstrated left T1bN0 infiltrating ductal carcinoma, ER positive, MO positive, HER-2 negative.  The anterior margin was positive for invasive tumor and other margins were positive for DCIS.  The right lumpectomy demonstrated DCIS, ER positive, MO positive with close but clear margins.  The patient has expected postoperative soreness but other wise voices no complaints.  Allergies   Allergen Reactions   • Promethazine      hypotension         Current Outpatient Prescriptions   Medication Sig Dispense Refill   • aspirin 81 MG EC tablet Take 81 mg by mouth daily.     • cilostazol (PLETAL) 50 MG tablet Take 1 tablet by mouth 2 (Two) Times a Day. 60 tablet 6   • diazepam (VALIUM) 10 MG tablet Take 10 mg by mouth 2 (two) times a day.     • HYDROcodone-acetaminophen (NORCO) 7.5-325 MG per tablet Take 1-2 tablets by mouth Every 4 (Four) Hours As Needed for moderate pain (4-6) (Pain) for up to 24 doses. 24 tablet 0   • metoprolol tartrate (LOPRESSOR) 50 MG tablet Take 50 mg by mouth 2 (two) times a day.     • nitroglycerin (NITROSTAT) 0.4 MG SL tablet Place 0.4 mg under the tongue Every 5 (Five) Minutes As Needed for Chest Pain. Take no more than 3 doses in 15 minutes.     • oxyCODONE-acetaminophen (PERCOCET) 5-325 MG per tablet Take 1 tablet by mouth Every 6 (Six) Hours As Needed for Moderate Pain 28 tablet 0   • rosuvastatin (CRESTOR) 40 MG tablet Take 40 mg by mouth Every Night.  5     No current facility-administered medications for this visit.        Objective   There were no vitals taken for this visit.   Physical Exam  General:  This is a WD WN white female in no acute distress  Vital signs stable,  afebrile  HEENT exam:  WNL. Sclera are anicteric.  EOMI  Neck:  supple, FROM.  No JVD.  Trachea midline  Lungs:  Respiratory effort normal. Auscultation: Coarse, bilateral expiratory rhonchi  Heart:  Regular rate and rhythm, without murmur, gallop, rub.  No pedal edema  Breasts: On visual inspection the breasts are symmetrical. Examination of the right breast demonstrates a healing surgical scar in the 11 to 12 o'clock position.  Examination of the left breast demonstrates a healing surgical scar in the breast and the axilla.  Abdomen: Nontender, without hepatosplenomegaly  Musculoskeletal:  muscle strength/tone is normal.    Psyc:  alert, oriented x 3.  Mood and affect are appropriate  skin:  Warm with good turgor.  Without rash or lesion  extremities:  Examination of the extremities revealed no cyanosis, clubbing or edema.  Results/Data  Pathology report was reviewed and discussed with the patient.    Procedures     Assessment/Plan     Low grade DCIS of the right breast.  Grade 1 infiltrating ductal carcinoma of the left breast    Plan: We'll proceed with left breast reexcision.  As the patient may well be a candidate to avoid radiation and as she is undergoing anesthesia anyway I am going to re-excise the close margins on the right side.       Discussion/Summary: Risks of the procedure were discussed with the patient who wishes to proceed    Errors in dictation may reflect use of voice recognition software and not all errors in transcription may have been detected prior to signing.    Future Appointments  Date Time Provider Department Center   1/18/2018 11:40 AM Jose F Ingram MD [facility] None     This document has been electronically signed by Lida CARMONA MD on December 23, 2017 6:36 PM

## 2017-12-26 ENCOUNTER — TELEPHONE (OUTPATIENT)
Dept: SURGERY | Facility: CLINIC | Age: 72
End: 2017-12-26

## 2017-12-27 ENCOUNTER — ANESTHESIA (OUTPATIENT)
Dept: PERIOP | Facility: HOSPITAL | Age: 72
End: 2017-12-27

## 2017-12-27 ENCOUNTER — ANESTHESIA EVENT (OUTPATIENT)
Dept: PERIOP | Facility: HOSPITAL | Age: 72
End: 2017-12-27

## 2017-12-27 ENCOUNTER — HOSPITAL ENCOUNTER (OUTPATIENT)
Facility: HOSPITAL | Age: 72
Setting detail: HOSPITAL OUTPATIENT SURGERY
Discharge: HOME | End: 2017-12-27
Attending: SURGERY | Admitting: SURGERY

## 2017-12-27 VITALS
TEMPERATURE: 97.9 F | SYSTOLIC BLOOD PRESSURE: 109 MMHG | HEIGHT: 63 IN | OXYGEN SATURATION: 97 % | BODY MASS INDEX: 24.45 KG/M2 | HEART RATE: 88 BPM | DIASTOLIC BLOOD PRESSURE: 72 MMHG | WEIGHT: 138 LBS | RESPIRATION RATE: 16 BRPM

## 2017-12-27 DIAGNOSIS — Z17.0 BILATERAL MALIGNANT NEOPLASM OF BREAST IN FEMALE, ESTROGEN RECEPTOR POSITIVE, UNSPECIFIED SITE OF BREAST (HCC): ICD-10-CM

## 2017-12-27 DIAGNOSIS — C50.912 BILATERAL MALIGNANT NEOPLASM OF BREAST IN FEMALE, ESTROGEN RECEPTOR POSITIVE, UNSPECIFIED SITE OF BREAST (HCC): ICD-10-CM

## 2017-12-27 DIAGNOSIS — C50.911 BILATERAL MALIGNANT NEOPLASM OF BREAST IN FEMALE, ESTROGEN RECEPTOR POSITIVE, UNSPECIFIED SITE OF BREAST (HCC): ICD-10-CM

## 2017-12-27 PROCEDURE — 25010000003 CEFAZOLIN PER 500 MG: Performed by: SURGERY

## 2017-12-27 PROCEDURE — 19301 PARTIAL MASTECTOMY: CPT | Performed by: SURGERY

## 2017-12-27 PROCEDURE — 88342 IMHCHEM/IMCYTCHM 1ST ANTB: CPT | Performed by: SURGERY

## 2017-12-27 PROCEDURE — 88307 TISSUE EXAM BY PATHOLOGIST: CPT | Performed by: SURGERY

## 2017-12-27 PROCEDURE — 25010000002 PROPOFOL 10 MG/ML EMULSION: Performed by: NURSE ANESTHETIST, CERTIFIED REGISTERED

## 2017-12-27 PROCEDURE — 25010000002 FENTANYL CITRATE (PF) 100 MCG/2ML SOLUTION: Performed by: NURSE ANESTHETIST, CERTIFIED REGISTERED

## 2017-12-27 PROCEDURE — 25010000002 ONDANSETRON PER 1 MG: Performed by: NURSE ANESTHETIST, CERTIFIED REGISTERED

## 2017-12-27 RX ORDER — SODIUM CHLORIDE, SODIUM LACTATE, POTASSIUM CHLORIDE, CALCIUM CHLORIDE 600; 310; 30; 20 MG/100ML; MG/100ML; MG/100ML; MG/100ML
125 INJECTION, SOLUTION INTRAVENOUS CONTINUOUS
Status: DISCONTINUED | OUTPATIENT
Start: 2017-12-27 | End: 2017-12-27 | Stop reason: HOSPADM

## 2017-12-27 RX ORDER — ONDANSETRON 2 MG/ML
4 INJECTION INTRAMUSCULAR; INTRAVENOUS EVERY 4 HOURS PRN
Status: DISCONTINUED | OUTPATIENT
Start: 2017-12-27 | End: 2017-12-27 | Stop reason: HOSPADM

## 2017-12-27 RX ORDER — ONDANSETRON 2 MG/ML
INJECTION INTRAMUSCULAR; INTRAVENOUS AS NEEDED
Status: DISCONTINUED | OUTPATIENT
Start: 2017-12-27 | End: 2017-12-27 | Stop reason: SURG

## 2017-12-27 RX ORDER — OXYCODONE HYDROCHLORIDE AND ACETAMINOPHEN 5; 325 MG/1; MG/1
1 TABLET ORAL ONCE AS NEEDED
Status: DISCONTINUED | OUTPATIENT
Start: 2017-12-27 | End: 2017-12-27 | Stop reason: HOSPADM

## 2017-12-27 RX ORDER — SODIUM CHLORIDE 0.9 % (FLUSH) 0.9 %
1-10 SYRINGE (ML) INJECTION AS NEEDED
Status: DISCONTINUED | OUTPATIENT
Start: 2017-12-27 | End: 2017-12-27 | Stop reason: HOSPADM

## 2017-12-27 RX ORDER — LIDOCAINE HYDROCHLORIDE 20 MG/ML
INJECTION, SOLUTION INFILTRATION; PERINEURAL AS NEEDED
Status: DISCONTINUED | OUTPATIENT
Start: 2017-12-27 | End: 2017-12-27 | Stop reason: SURG

## 2017-12-27 RX ORDER — MIDAZOLAM HYDROCHLORIDE 1 MG/ML
2 INJECTION INTRAMUSCULAR; INTRAVENOUS
Status: DISCONTINUED | OUTPATIENT
Start: 2017-12-27 | End: 2017-12-27 | Stop reason: HOSPADM

## 2017-12-27 RX ORDER — FENTANYL CITRATE 50 UG/ML
INJECTION, SOLUTION INTRAMUSCULAR; INTRAVENOUS AS NEEDED
Status: DISCONTINUED | OUTPATIENT
Start: 2017-12-27 | End: 2017-12-27 | Stop reason: SURG

## 2017-12-27 RX ORDER — HYDROCODONE BITARTRATE AND ACETAMINOPHEN 7.5; 325 MG/1; MG/1
1 TABLET ORAL 4 TIMES DAILY PRN
Qty: 28 TABLET | Refills: 0 | Status: SHIPPED | OUTPATIENT
Start: 2017-12-27 | End: 2018-01-24

## 2017-12-27 RX ORDER — MIDAZOLAM HYDROCHLORIDE 1 MG/ML
1 INJECTION INTRAMUSCULAR; INTRAVENOUS
Status: DISCONTINUED | OUTPATIENT
Start: 2017-12-27 | End: 2017-12-27 | Stop reason: HOSPADM

## 2017-12-27 RX ORDER — FAMOTIDINE 10 MG/ML
INJECTION, SOLUTION INTRAVENOUS AS NEEDED
Status: DISCONTINUED | OUTPATIENT
Start: 2017-12-27 | End: 2017-12-27 | Stop reason: SURG

## 2017-12-27 RX ORDER — IPRATROPIUM BROMIDE AND ALBUTEROL SULFATE 2.5; .5 MG/3ML; MG/3ML
3 SOLUTION RESPIRATORY (INHALATION) ONCE AS NEEDED
Status: DISCONTINUED | OUTPATIENT
Start: 2017-12-27 | End: 2017-12-27 | Stop reason: HOSPADM

## 2017-12-27 RX ORDER — ONDANSETRON 2 MG/ML
4 INJECTION INTRAMUSCULAR; INTRAVENOUS ONCE AS NEEDED
Status: DISCONTINUED | OUTPATIENT
Start: 2017-12-27 | End: 2017-12-27 | Stop reason: HOSPADM

## 2017-12-27 RX ORDER — PROPOFOL 10 MG/ML
VIAL (ML) INTRAVENOUS AS NEEDED
Status: DISCONTINUED | OUTPATIENT
Start: 2017-12-27 | End: 2017-12-27 | Stop reason: SURG

## 2017-12-27 RX ORDER — MAGNESIUM HYDROXIDE 1200 MG/15ML
LIQUID ORAL AS NEEDED
Status: DISCONTINUED | OUTPATIENT
Start: 2017-12-27 | End: 2017-12-27 | Stop reason: HOSPADM

## 2017-12-27 RX ORDER — HYDROCODONE BITARTRATE AND ACETAMINOPHEN 7.5; 325 MG/1; MG/1
1 TABLET ORAL EVERY 4 HOURS PRN
Status: DISCONTINUED | OUTPATIENT
Start: 2017-12-27 | End: 2017-12-27 | Stop reason: HOSPADM

## 2017-12-27 RX ORDER — FENTANYL CITRATE 50 UG/ML
50 INJECTION, SOLUTION INTRAMUSCULAR; INTRAVENOUS
Status: DISCONTINUED | OUTPATIENT
Start: 2017-12-27 | End: 2017-12-27 | Stop reason: HOSPADM

## 2017-12-27 RX ORDER — MEPERIDINE HYDROCHLORIDE 25 MG/ML
12.5 INJECTION INTRAMUSCULAR; INTRAVENOUS; SUBCUTANEOUS
Status: DISCONTINUED | OUTPATIENT
Start: 2017-12-27 | End: 2017-12-27 | Stop reason: HOSPADM

## 2017-12-27 RX ADMIN — PROPOFOL 100 MG: 10 INJECTION, EMULSION INTRAVENOUS at 07:37

## 2017-12-27 RX ADMIN — FENTANYL CITRATE 50 MCG: 50 INJECTION INTRAMUSCULAR; INTRAVENOUS at 07:56

## 2017-12-27 RX ADMIN — CEFAZOLIN SODIUM 2 G: 2 SOLUTION INTRAVENOUS at 07:32

## 2017-12-27 RX ADMIN — LIDOCAINE HYDROCHLORIDE 40 MG: 20 INJECTION, SOLUTION INFILTRATION; PERINEURAL at 07:37

## 2017-12-27 RX ADMIN — FENTANYL CITRATE 50 MCG: 50 INJECTION INTRAMUSCULAR; INTRAVENOUS at 09:31

## 2017-12-27 RX ADMIN — FAMOTIDINE 20 MG: 10 INJECTION, SOLUTION INTRAVENOUS at 07:37

## 2017-12-27 RX ADMIN — PROPOFOL 50 MG: 10 INJECTION, EMULSION INTRAVENOUS at 07:55

## 2017-12-27 RX ADMIN — ONDANSETRON 4 MG: 2 INJECTION, SOLUTION INTRAMUSCULAR; INTRAVENOUS at 07:37

## 2017-12-27 RX ADMIN — FENTANYL CITRATE 50 MCG: 50 INJECTION INTRAMUSCULAR; INTRAVENOUS at 08:39

## 2017-12-27 RX ADMIN — EPHEDRINE SULFATE 10 MG: 50 INJECTION INTRAMUSCULAR; INTRAVENOUS; SUBCUTANEOUS at 07:50

## 2017-12-27 RX ADMIN — SODIUM CHLORIDE, POTASSIUM CHLORIDE, SODIUM LACTATE AND CALCIUM CHLORIDE 125 ML/HR: 600; 310; 30; 20 INJECTION, SOLUTION INTRAVENOUS at 07:15

## 2017-12-27 RX ADMIN — EPHEDRINE SULFATE 10 MG: 50 INJECTION INTRAMUSCULAR; INTRAVENOUS; SUBCUTANEOUS at 07:45

## 2017-12-27 NOTE — ANESTHESIA POSTPROCEDURE EVALUATION
Patient: Sirisha STEVENSON Valdez    Procedure Summary     Date Anesthesia Start Anesthesia Stop Room / Location    12/27/17 0732 0903  [facility] / [facility]       Procedure Diagnosis Surgeon Provider    right breast re-excision  left breast re-excision with 3 cm inferior and superior advancement flap (Bilateral Breast) Bilateral malignant neoplasm of breast in female, estrogen receptor positive, unspecified site of breast  (Bilateral malignant neoplasm of breast in female, estrogen receptor positive, unspecified site of breast [C50.911, Z17.0, C50.912]) MD Genaro Stoddard MD          Anesthesia Type: general  Last vitals  BP   109/72 (12/27/17 0939)   Temp   97.9 °F (36.6 °C) (12/27/17 0939)   Pulse   88 (12/27/17 0939)   Resp   16 (12/27/17 0939)     SpO2   97 % (12/27/17 0939)     Post Anesthesia Care and Evaluation    Patient location during evaluation: PHASE II  Patient participation: complete - patient participated  Level of consciousness: awake and alert  Pain score: 1  Pain management: adequate  Airway patency: patent  Anesthetic complications: No anesthetic complications  PONV Status: controlled  Cardiovascular status: acceptable  Respiratory status: acceptable  Hydration status: acceptable

## 2017-12-27 NOTE — ADDENDUM NOTE
Addendum  created 12/27/17 1414 by Nickie Gamboa CRNA    Anesthesia Intra Blocks edited, Child order released for a procedure order, LDA created via procedure documentation, Sign clinical note

## 2017-12-27 NOTE — ANESTHESIA PROCEDURE NOTES
Airway    General Information and Staff    CRNA: HELEN THURMAN    Indications and Patient Condition    Preoxygenated: yes  Mask difficulty assessment: 0 - not attempted    Final Airway Details  Final airway type: supraglottic airway      Successful airway: Size 3    Number of attempts at approach: 1

## 2017-12-27 NOTE — ANESTHESIA PREPROCEDURE EVALUATION
Anesthesia Evaluation     Patient summary reviewed and Nursing notes reviewed   NPO Solid Status: > 8 hours  NPO Liquid Status: > 8 hours     Airway   Mallampati: III  TM distance: <3 FB  Neck ROM: limited  Dental - normal exam   (+) lower dentures and upper dentures    Pulmonary - negative pulmonary ROS and normal exam   Cardiovascular - normal exam  Exercise tolerance: good (4-7 METS)    NYHA Classification: II  PT is on anticoagulation therapy  Patient on routine beta blocker and Beta blocker given within 24 hours of surgery    (+) hypertension, past MI  >12 months, CAD, PVD,       Neuro/Psych  (+) headaches, dizziness/light headedness,     GI/Hepatic/Renal/Endo    (+)  GERD,     Musculoskeletal     Abdominal  - normal exam    Bowel sounds: normal.   Substance History - negative use     OB/GYN negative ob/gyn ROS         Other   (+) arthritis                                             Anesthesia Plan    ASA 3     general     intravenous induction   Anesthetic plan and risks discussed with patient.

## 2018-01-02 LAB
LAB AP CASE REPORT: NORMAL
Lab: NORMAL
PATH REPORT.FINAL DX SPEC: NORMAL

## 2018-01-10 ENCOUNTER — TELEPHONE (OUTPATIENT)
Dept: SURGERY | Facility: CLINIC | Age: 73
End: 2018-01-10

## 2018-01-10 ENCOUNTER — OFFICE VISIT (OUTPATIENT)
Dept: SURGERY | Facility: CLINIC | Age: 73
End: 2018-01-10

## 2018-01-10 VITALS
HEART RATE: 68 BPM | SYSTOLIC BLOOD PRESSURE: 127 MMHG | DIASTOLIC BLOOD PRESSURE: 65 MMHG | BODY MASS INDEX: 23.67 KG/M2 | WEIGHT: 133.6 LBS | HEIGHT: 63 IN

## 2018-01-10 DIAGNOSIS — C50.912 BILATERAL MALIGNANT NEOPLASM OF BREAST IN FEMALE, ESTROGEN RECEPTOR POSITIVE, UNSPECIFIED SITE OF BREAST (HCC): Primary | ICD-10-CM

## 2018-01-10 DIAGNOSIS — Z17.0 BILATERAL MALIGNANT NEOPLASM OF BREAST IN FEMALE, ESTROGEN RECEPTOR POSITIVE, UNSPECIFIED SITE OF BREAST (HCC): Primary | ICD-10-CM

## 2018-01-10 DIAGNOSIS — C50.911 BILATERAL MALIGNANT NEOPLASM OF BREAST IN FEMALE, ESTROGEN RECEPTOR POSITIVE, UNSPECIFIED SITE OF BREAST (HCC): Primary | ICD-10-CM

## 2018-01-10 DIAGNOSIS — Z09 POSTOP CHECK: ICD-10-CM

## 2018-01-10 LAB
LAB AP CASE REPORT: NORMAL
Lab: NORMAL
Lab: NORMAL
PATH REPORT.FINAL DX SPEC: NORMAL
PATH REPORT.GROSS SPEC: NORMAL

## 2018-01-10 PROCEDURE — 99024 POSTOP FOLLOW-UP VISIT: CPT | Performed by: SURGERY

## 2018-01-10 RX ORDER — ANASTROZOLE 1 MG/1
1 TABLET ORAL DAILY
Qty: 30 TABLET | Refills: 5 | Status: SHIPPED | OUTPATIENT
Start: 2018-01-10 | End: 2022-08-23

## 2018-01-10 NOTE — PROGRESS NOTES
Subjective   Sirisha Valdez is a 72 y.o. female here today for post-op.    History of Present Illness  Ms. Valdez was seen in the office today for breast cancer follow-up.  She is status post a left breast lumpectomy and sentinel lymph node biopsy and a right breast lumpectomy.  Final pathology demonstrated left T1bN0 infiltrating ductal carcinoma, ER positive, NV positive, HER-2 negative.  The anterior margin was positive for invasive tumor and other margins were positive for DCIS.  The right lumpectomy demonstrated DCIS, ER positive, NV positive with close but clear margins.  The patient underwent margin reexcision of the left and the right breast on 12/27/17.  The right lumpectomy showed no residual disease and the left side demonstrated a very small focus of DCIS.  The patient voices no complaints related to her surgery.  Allergies   Allergen Reactions   • Promethazine      hypotension         Current Outpatient Prescriptions   Medication Sig Dispense Refill   • aspirin 81 MG EC tablet Take 81 mg by mouth daily.     • cilostazol (PLETAL) 50 MG tablet Take 1 tablet by mouth 2 (Two) Times a Day. 60 tablet 6   • diazepam (VALIUM) 10 MG tablet Take 10 mg by mouth 2 (two) times a day.     • HYDROcodone-acetaminophen (NORCO) 7.5-325 MG per tablet Take 1 tablet by mouth 4 (Four) Times a Day As Needed for Moderate Pain 28 tablet 0   • metoprolol tartrate (LOPRESSOR) 50 MG tablet Take 50 mg by mouth 2 (two) times a day.     • nitroglycerin (NITROSTAT) 0.4 MG SL tablet Place 0.4 mg under the tongue Every 5 (Five) Minutes As Needed for Chest Pain. Take no more than 3 doses in 15 minutes.     • oxyCODONE-acetaminophen (PERCOCET) 5-325 MG per tablet Take 1 tablet by mouth Every 6 (Six) Hours As Needed for Moderate Pain 28 tablet 0   • rosuvastatin (CRESTOR) 40 MG tablet Take 40 mg by mouth Every Night.  5     No current facility-administered medications for this visit.        Objective   /65 (BP Location: Left arm,  "Patient Position: Sitting)  Pulse 68  Ht 160 cm (63\")  Wt 60.6 kg (133 lb 9.6 oz)  BMI 23.67 kg/m2   Physical Exam  On examination this is a well-developed well-nourished white female in no acute distress  HEENT examination: Within normal limits.  Conjunctiva pink.  Nose and ears appear normal.  Neck: Supple, full range of motion.  No JVD.  Musculoskeletal: Full range of motion all extremities without focal weakness. Normal gait. No digital cyanosis.  Psych: Patient is alert, oriented x3. Mood and affect are appropriate.  Breasts: Surgical incisions are healing well.  There is some fullness in the left breast suggestive of a mild hematoma/seroma.  Results/Data  Pathology report was reviewed and discussed with the patient and daughter    Procedures     Assessment/Plan      Low grade DCIS of the right breast.  Grade 1 infiltrating ductal carcinoma of the left breast T1b N0 MX, ER positive, DC positive  Both sites with clear margins on reexcision    Given the patient's age greater than 70, low grade of the tumor and clear margin status I think it is reasonable to forego the radiation as per new guidelines.  The patient will be started on an aromatase inhibitor and will require every 6 month mammograms for 2 years.  6 weeks follow-up to recheck incision  A she'll need baseline bone density.  She states that her primary care provider Dr. Reynolds has a machine in the office and the patient has an appointment with her for the end of the month.  We will schedule the bone density to be done at that appointment.       Discussion/Summary    Errors in dictation may reflect use of voice recognition software and not all errors in transcription may have been detected prior to signing.    Future Appointments  Date Time Provider Department Center   1/18/2018 11:40 AM Jose F Ingram MD [facility] None       "

## 2018-01-10 NOTE — TELEPHONE ENCOUNTER
Called Dr. Reynolds's office to see if they can do a bone density scan, they said to have her come in at 9 on the day of her appointment  And they would get it done.      So I called patient and spoke with her daughter Radha and she is aware of appointment day and time

## 2018-02-05 ENCOUNTER — TELEPHONE (OUTPATIENT)
Dept: SURGERY | Facility: CLINIC | Age: 73
End: 2018-02-05

## 2018-02-05 NOTE — TELEPHONE ENCOUNTER
Called to let patient know that Dr. Babin reviewed her bone density test and she needs to start taking Vitamin D and Calcium bid

## 2018-03-01 RX ORDER — CILOSTAZOL 50 MG/1
TABLET ORAL
Qty: 60 TABLET | Refills: 2 | Status: SHIPPED | OUTPATIENT
Start: 2018-03-01 | End: 2018-05-23 | Stop reason: SDUPTHER

## 2018-03-07 ENCOUNTER — OFFICE VISIT (OUTPATIENT)
Dept: SURGERY | Facility: CLINIC | Age: 73
End: 2018-03-07

## 2018-03-07 VITALS
HEIGHT: 63 IN | SYSTOLIC BLOOD PRESSURE: 132 MMHG | WEIGHT: 130.8 LBS | BODY MASS INDEX: 23.18 KG/M2 | HEART RATE: 72 BPM | DIASTOLIC BLOOD PRESSURE: 64 MMHG

## 2018-03-07 DIAGNOSIS — M85.80 OSTEOPENIA, UNSPECIFIED LOCATION: ICD-10-CM

## 2018-03-07 DIAGNOSIS — Z17.0 BILATERAL MALIGNANT NEOPLASM OF BREAST IN FEMALE, ESTROGEN RECEPTOR POSITIVE, UNSPECIFIED SITE OF BREAST (HCC): Primary | ICD-10-CM

## 2018-03-07 DIAGNOSIS — Z79.811 AROMATASE INHIBITOR USE: ICD-10-CM

## 2018-03-07 DIAGNOSIS — C50.912 BILATERAL MALIGNANT NEOPLASM OF BREAST IN FEMALE, ESTROGEN RECEPTOR POSITIVE, UNSPECIFIED SITE OF BREAST (HCC): Primary | ICD-10-CM

## 2018-03-07 DIAGNOSIS — Z09 POSTOP CHECK: ICD-10-CM

## 2018-03-07 DIAGNOSIS — C50.911 BILATERAL MALIGNANT NEOPLASM OF BREAST IN FEMALE, ESTROGEN RECEPTOR POSITIVE, UNSPECIFIED SITE OF BREAST (HCC): Primary | ICD-10-CM

## 2018-03-07 PROCEDURE — 99024 POSTOP FOLLOW-UP VISIT: CPT | Performed by: SURGERY

## 2018-03-07 PROCEDURE — 93702 BIS XTRACELL FLUID ANALYSIS: CPT | Performed by: SURGERY

## 2018-03-07 NOTE — PROGRESS NOTES
Subjective   Sirisha Valdez is a 72 y.o. female follow up     History of Present Illness  Ms. Valdez was seen in the office today for breast cancer follow-up.  She is status post a left breast lumpectomy and sentinel lymph node biopsy and a right breast lumpectomy on 12/12/17.  Final pathology demonstrated left T1bN0 infiltrating ductal carcinoma, ER positive, NV positive, HER-2 negative.  The anterior margin was positive for invasive tumor and other margins were positive for DCIS.  The right lumpectomy demonstrated DCIS, ER positive, NV positive with close but clear margins.  The patient underwent margin reexcision of the left and the right breast on 12/27/17.  The right lumpectomy showed no residual disease and the left side demonstrated a very small focus of DCIS.   Due to the patient's age and clear margin status the decision was made to forego radiation.  The patient was started on anastrozole in January.  She states she is tolerating it well with no side effects.  The patient has no complaints referable to the breast.  The patient had a bone density study done on 1/29/18 which demonstrated normal lumbar spine, normal left hip, left radius with a T score of -1.1  Allergies   Allergen Reactions   • Promethazine      hypotension       Current Outpatient Prescriptions   Medication Sig Dispense Refill   • aspirin 81 MG EC tablet Take 81 mg by mouth daily.     • cilostazol (PLETAL) 50 MG tablet TAKE 1 TABLET BY MOUTH 2 TIMES DAILY 60 tablet 2   • diazepam (VALIUM) 10 MG tablet Take 10 mg by mouth 2 (two) times a day.     • metoprolol tartrate (LOPRESSOR) 50 MG tablet Take 50 mg by mouth 2 (two) times a day.     • nitroglycerin (NITROSTAT) 0.4 MG SL tablet Place 0.4 mg under the tongue Every 5 (Five) Minutes As Needed for Chest Pain. Take no more than 3 doses in 15 minutes.     • oxyCODONE-acetaminophen (PERCOCET) 5-325 MG per tablet Take 1 tablet by mouth Every 6 (Six) Hours As Needed for Moderate Pain 28 tablet 0    • rosuvastatin (CRESTOR) 40 MG tablet Take 40 mg by mouth Every Night.  5     No current facility-administered medications for this visit.      Past Medical History:   Diagnosis Date   • Arthritis    • Breast cancer    • Coronary artery disease     3 HEART STENTS   • Gastroesophageal reflux    • Glaucoma    • Hypertension    • MI (myocardial infarction)    • Migraine    • Peripheral arterial disease     KIM LEG STENTS       ANDFEM POP   • Peripheral vascular disease    • SSS (sick sinus syndrome)    • Status post placement of cardiac pacemaker    • Wears dentures    • Wears glasses      Past Surgical History:   Procedure Laterality Date   • BREAST LUMPECTOMY Right 12/12/2017    Procedure: BREAST LUMPECTOMY WITH NEEDLE LOCALIZATION;  Surgeon: Lida Babin MD;  Location: [facility];  Service:    • BREAST LUMPECTOMY Bilateral 12/27/2017    Procedure: right breast re-excision  left breast re-excision with 3 cm inferior and superior advancement flap;  Surgeon: Lida Babin MD;  Location: [facility];  Service:    • BREAST LUMPECTOMY WITH SENTINEL NODE BIOPSY AND AXILLARY NODE DISSECTION Left 12/12/2017    Procedure: Left BREAST LUMPECTOMY WITH SENTINEL NODE BIOPSY  Isotope and Lymphazurin injection left breast. Left breast preoperative needle localization;  Surgeon: Lida Babin MD;  Location: [facility];  Service:    • BREAST SURGERY      Mass Removal.   • CARDIAC CATHETERIZATION     • CARPAL TUNNEL RELEASE     • CATARACT EXTRACTION     • FEMORAL DISTAL BYPASS     • HIP SURGERY     • HYSTERECTOMY     • PACEMAKER IMPLANTATION     • SHOULDER ARTHROSCOPY W/ ROTATOR CUFF REPAIR Left 3/8/2017    Procedure: LEFT SHOULDER ARTHROSCOPY, ACROMIOPLASTY, SUBACROMIC DEBRIDMENT;  Surgeon: Tesfaye Polo MD;  Location: [facility];  Service:    • SHOULDER SURGERY       The following portions of the patient's history were reviewed and updated as appropriate: allergies, current medications, past family history,  "past medical history, past social history, past surgical history and problem list.    Review of systems:  Unchanged from prior except HPI    Objective   /64 (BP Location: Left arm, Patient Position: Sitting)  Pulse 72  Ht 160 cm (62.99\")  Wt 59.3 kg (130 lb 12.8 oz)  BMI 23.18 kg/m2   Physical Exam  On examination this is a well-developed well-nourished white female in no acute distress  HEENT examination: Within normal limits.  Conjunctiva pink.  Nose and ears appear normal.  Neck: Supple, full range of motion.  No JVD.  Musculoskeletal: Full range of motion all extremities without focal weakness. Normal gait. No digital cyanosis.  Psych: Patient is alert, oriented x3. Mood and affect are appropriate.  Breasts: Right 10:00 incision and left breast and axillary incisions are healing well.  Good conformity of the breasts.  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  Grade 1 infiltrating ductal carcinoma of the left breast T1b N0 MX, ER positive, AL positive  Both sites with clear margins on reexcision  Osteopenia    Plan:  Continue anastrozole  Continue calcium and vitamin D  Bilateral mammogram in June, 2018 with return to the office following         Discussion/Summary    Errors in dictation may reflect use of voice recognition software and not all errors in transcription may have been detected prior to signing.    Future Appointments  Date Time Provider Department Center   3/8/2018 9:20 AM Jose F Ingram MD [facility] None   3/8/2018 11:45 AM [facility] None     "

## 2018-03-08 ENCOUNTER — TELEPHONE (OUTPATIENT)
Dept: CARDIOLOGY | Facility: CLINIC | Age: 73
End: 2018-03-08

## 2018-03-08 ENCOUNTER — OFFICE VISIT (OUTPATIENT)
Dept: CARDIOLOGY | Facility: CLINIC | Age: 73
End: 2018-03-08

## 2018-03-08 VITALS
BODY MASS INDEX: 23.57 KG/M2 | HEIGHT: 63 IN | SYSTOLIC BLOOD PRESSURE: 122 MMHG | DIASTOLIC BLOOD PRESSURE: 71 MMHG | WEIGHT: 133 LBS | HEART RATE: 67 BPM

## 2018-03-08 DIAGNOSIS — E78.5 DYSLIPIDEMIA: ICD-10-CM

## 2018-03-08 DIAGNOSIS — I10 ESSENTIAL HYPERTENSION: ICD-10-CM

## 2018-03-08 DIAGNOSIS — I73.9 PVD (PERIPHERAL VASCULAR DISEASE) (HCC): Primary | ICD-10-CM

## 2018-03-08 DIAGNOSIS — Z72.0 TOBACCO ABUSE: ICD-10-CM

## 2018-03-08 DIAGNOSIS — I25.10 ARTERIOSCLEROTIC CARDIOVASCULAR DISEASE (ASCVD): ICD-10-CM

## 2018-03-08 PROCEDURE — 93000 ELECTROCARDIOGRAM COMPLETE: CPT | Performed by: INTERNAL MEDICINE

## 2018-03-08 PROCEDURE — 99213 OFFICE O/P EST LOW 20 MIN: CPT | Performed by: INTERNAL MEDICINE

## 2018-03-08 RX ORDER — HYDROCODONE BITARTRATE AND ACETAMINOPHEN 7.5; 325 MG/1; MG/1
1 TABLET ORAL EVERY 6 HOURS PRN
COMMUNITY

## 2018-03-08 RX ORDER — MELATONIN
1000 DAILY
Status: ON HOLD | COMMUNITY
End: 2019-06-26

## 2018-03-08 RX ORDER — ANASTROZOLE 1 MG/1
TABLET ORAL DAILY
COMMUNITY
End: 2018-06-22 | Stop reason: SDUPTHER

## 2018-03-08 NOTE — PROGRESS NOTES
Yasmine Reynolds MD  Sirisha Valdez  1945      Patient Active Problem List   Diagnosis   • Arteriosclerotic cardiovascular disease (ASCVD), s/p stenting in 1999, after a myocardial infarction.    • SSS (sick sinus syndrome), s/p permanent pacemaker implantion,    • Palpitations   • Dizziness   • Dyslipidemia, currently on statin   • Essential hypertension, well-contolled.    • PVD (peripheral vascular disease)   • Swelling of both lower extremities   • Chronic left shoulder pain   • S/P arthroscopy of shoulder   • Coagulopathy   • Bilateral malignant neoplasm of breast in female, estrogen receptor positive   • Tobacco abuse       Dear Yasmine Reynolds MD:    Subjective     Sirisha Valdez is a 72 y.o. female with the above medical problems who is here today for follow-up. According to the patient she has been doing fairly well since her last visit.  He denies chest pain, shortness breath, palpitations, syncope, PND, lower extremity edema, dizziness or syncope.  She states she has not been having any claudication since her last visit.  She continues to smoke in spite of multiple recommendations on smoking cessation.         Current Outpatient Prescriptions:   •  anastrozole (ARIMIDEX) 1 MG tablet, Take  by mouth Daily., Disp: , Rfl:   •  aspirin 81 MG EC tablet, Take 81 mg by mouth daily., Disp: , Rfl:   •  CALCIUM CARBONATE PO, Take  by mouth., Disp: , Rfl:   •  cholecalciferol (VITAMIN D3) 1000 units tablet, Take 1,000 Units by mouth Daily., Disp: , Rfl:   •  cilostazol (PLETAL) 50 MG tablet, TAKE 1 TABLET BY MOUTH 2 TIMES DAILY, Disp: 60 tablet, Rfl: 2  •  diazepam (VALIUM) 10 MG tablet, Take 10 mg by mouth 2 (two) times a day., Disp: , Rfl:   •  HYDROcodone-acetaminophen (NORCO) 7.5-325 MG per tablet, Take 1 tablet by mouth Every 6 (Six) Hours As Needed for Moderate Pain ., Disp: , Rfl:   •  metoprolol tartrate (LOPRESSOR) 50 MG tablet, Take 50 mg by mouth 2 (two) times a day., Disp: , Rfl:   •   "rosuvastatin (CRESTOR) 40 MG tablet, Take 40 mg by mouth Every Night., Disp: , Rfl: 5  •  nitroglycerin (NITROSTAT) 0.4 MG SL tablet, Place 0.4 mg under the tongue Every 5 (Five) Minutes As Needed for Chest Pain. Take no more than 3 doses in 15 minutes., Disp: , Rfl:   •  oxyCODONE-acetaminophen (PERCOCET) 5-325 MG per tablet, Take 1 tablet by mouth Every 6 (Six) Hours As Needed for Moderate Pain, Disp: 28 tablet, Rfl: 0    The following portions of the patient's history were reviewed and updated as appropriate: allergies, current medications, past family history, past medical history, past social history, past surgical history and problem list.    Review of Systems   Constitution: Negative for chills, diaphoresis and fever.   Cardiovascular: Negative for chest pain, leg swelling, orthopnea, palpitations and paroxysmal nocturnal dyspnea.   Respiratory: Negative for cough, hemoptysis and shortness of breath.    Endocrine: Negative for cold intolerance and heat intolerance.   Hematologic/Lymphatic: Does not bruise/bleed easily.   Skin: Negative for rash.   Musculoskeletal: Negative for myalgias.   Gastrointestinal: Negative for abdominal pain, constipation, diarrhea, nausea and vomiting.   Genitourinary: Negative for dysuria and hematuria.   Neurological: Negative for dizziness, focal weakness, light-headedness and numbness.       Objective   Blood pressure 122/71, pulse 67, height 160 cm (63\"), weight 60.3 kg (133 lb).    Physical Exam   Constitutional: She is oriented to person, place, and time. She appears well-developed and well-nourished.   White female sitting comfortably on chair.   HENT:   Mouth/Throat: Oropharynx is clear and moist.   Eyes: EOM are normal. Pupils are equal, round, and reactive to light.   Neck: Neck supple. No JVD present. No tracheal deviation present. No thyromegaly present.   Cardiovascular: Normal rate, regular rhythm, S1 normal and S2 normal.  Exam reveals no gallop and no friction rub.  "   No murmur heard.  Pulmonary/Chest: Effort normal. No respiratory distress. She has no wheezes. She has no rales.   Abdominal: Soft. Bowel sounds are normal. She exhibits no mass. There is no tenderness.   Musculoskeletal: Normal range of motion. She exhibits no edema.   Lymphadenopathy:     She has no cervical adenopathy.   Neurological: She is alert and oriented to person, place, and time.   Skin: Skin is warm and dry. No rash noted.   Psychiatric: She has a normal mood and affect.         ECG 12 Lead  Date/Time: 3/8/2018 9:27 AM  Performed by: PAM ROY  Authorized by: PAM ROY   Comparison: compared with previous ECG from 8/15/2017  Similar to previous ECG  Rhythm: sinus rhythm  Rate: normal  BPM: 67  Conduction: conduction normal  ST Segments: ST segments normal  T Waves: T waves normal  QRS axis: normal  Q waves: III  Clinical impression: non-specific ECG  Comments: Ventricular pacing at 100 bpm on magnet application.            Assessment/Plan     1.  Peripheral vascular disease: Patient with history of peripheral vascular disease status post femorofemoral bypass as well as bilateral lower extremity sent placement.  ABIs done during last visit showed decreased circulation to right lower extremity with triphasic flow.  According to the patient she has been doing well since her last visit.  She has had no symptoms of claudication.  We will continue current regimen.    2.  Coronary artery disease: Patient with history of coronary artery disease currently on aspirin,beta blocker and statin.  Unable to start ACE inhibitor due to renal dysfunction.  At this point we'll continue current regimen.    3.  Hypertension: Patient is a history of hypertension currently well controlled on current regimen.     4.  Dyslipidemia: Patient with history of dyslipidemia.  Lipid panel showed adequate lipid control.  We will obtain labs from primary care provider.    5. Tobacco abuse: The  patient continues to smoke in spite of multiple counseling sessions and tobacco cessation.  She does not appear interested in discontinuing smoking.  I once again advised her of the dangers of     Diagnosis Plan   1. PVD (peripheral vascular disease)     2. Arteriosclerotic cardiovascular disease (ASCVD), s/p stenting in 1999, after a myocardial infarction.      3. Essential hypertension, well-contolled.      4. Dyslipidemia, currently on statin     5. Tobacco abuse            Return in about 6 months (around 9/8/2018).    I appreciate the opportunity to participate in this patient's cardiovascular care.    Best Regards    Jose F Be

## 2018-03-08 NOTE — TELEPHONE ENCOUNTER
----- Message from Jose F Ingram MD sent at 3/8/2018 12:16 PM EST -----   Please obtain recent labs from the patient's primary care provider.

## 2018-04-12 ENCOUNTER — CLINICAL SUPPORT (OUTPATIENT)
Dept: CARDIOLOGY | Facility: CLINIC | Age: 73
End: 2018-04-12

## 2018-04-12 DIAGNOSIS — I49.5 SSS (SICK SINUS SYNDROME) (HCC): Primary | ICD-10-CM

## 2018-04-12 PROCEDURE — 93280 PM DEVICE PROGR EVAL DUAL: CPT | Performed by: INTERNAL MEDICINE

## 2018-05-23 RX ORDER — CILOSTAZOL 50 MG/1
TABLET ORAL
Qty: 60 TABLET | Refills: 2 | Status: SHIPPED | OUTPATIENT
Start: 2018-05-23 | End: 2018-09-17 | Stop reason: SDUPTHER

## 2018-06-19 ENCOUNTER — APPOINTMENT (OUTPATIENT)
Dept: ULTRASOUND IMAGING | Facility: HOSPITAL | Age: 73
End: 2018-06-19

## 2018-06-19 ENCOUNTER — HOSPITAL ENCOUNTER (EMERGENCY)
Facility: HOSPITAL | Age: 73
Discharge: ANOTHER HOSPITAL | End: 2018-06-19
Attending: INTERNAL MEDICINE | Admitting: INTERNAL MEDICINE

## 2018-06-19 ENCOUNTER — APPOINTMENT (OUTPATIENT)
Dept: GENERAL RADIOLOGY | Facility: HOSPITAL | Age: 73
End: 2018-06-19

## 2018-06-19 ENCOUNTER — TELEPHONE (OUTPATIENT)
Dept: CARDIOLOGY | Facility: CLINIC | Age: 73
End: 2018-06-19

## 2018-06-19 VITALS
SYSTOLIC BLOOD PRESSURE: 126 MMHG | DIASTOLIC BLOOD PRESSURE: 62 MMHG | WEIGHT: 129 LBS | HEIGHT: 63 IN | HEART RATE: 68 BPM | TEMPERATURE: 97.7 F | BODY MASS INDEX: 22.86 KG/M2 | OXYGEN SATURATION: 96 % | RESPIRATION RATE: 16 BRPM

## 2018-06-19 DIAGNOSIS — I70.209 ARTERIAL OCCLUSION, LOWER EXTREMITY (HCC): Primary | ICD-10-CM

## 2018-06-19 LAB
ALBUMIN SERPL-MCNC: 3.9 G/DL (ref 3.4–4.8)
ALBUMIN/GLOB SERPL: 1.4 G/DL (ref 1.5–2.5)
ALP SERPL-CCNC: 70 U/L (ref 35–104)
ALT SERPL W P-5'-P-CCNC: 11 U/L (ref 10–36)
ANION GAP SERPL CALCULATED.3IONS-SCNC: 5.3 MMOL/L (ref 3.6–11.2)
APTT PPP: 26.2 SECONDS (ref 23.8–36.1)
AST SERPL-CCNC: 25 U/L (ref 10–30)
BASOPHILS # BLD AUTO: 0.04 10*3/MM3 (ref 0–0.3)
BASOPHILS NFR BLD AUTO: 0.4 % (ref 0–2)
BILIRUB SERPL-MCNC: 0.3 MG/DL (ref 0.2–1.8)
BNP SERPL-MCNC: 110 PG/ML (ref 0–100)
BUN BLD-MCNC: 9 MG/DL (ref 7–21)
BUN/CREAT SERPL: 7.9 (ref 7–25)
CALCIUM SPEC-SCNC: 9.4 MG/DL (ref 7.7–10)
CHLORIDE SERPL-SCNC: 113 MMOL/L (ref 99–112)
CO2 SERPL-SCNC: 25.7 MMOL/L (ref 24.3–31.9)
CREAT BLD-MCNC: 1.14 MG/DL (ref 0.43–1.29)
DEPRECATED RDW RBC AUTO: 49.3 FL (ref 37–54)
EOSINOPHIL # BLD AUTO: 0.71 10*3/MM3 (ref 0–0.7)
EOSINOPHIL NFR BLD AUTO: 7.1 % (ref 0–7)
ERYTHROCYTE [DISTWIDTH] IN BLOOD BY AUTOMATED COUNT: 14.6 % (ref 11.5–14.5)
GFR SERPL CREATININE-BSD FRML MDRD: 47 ML/MIN/1.73
GLOBULIN UR ELPH-MCNC: 2.7 GM/DL
GLUCOSE BLD-MCNC: 135 MG/DL (ref 70–110)
HCT VFR BLD AUTO: 39.4 % (ref 37–47)
HGB BLD-MCNC: 13.3 G/DL (ref 12–16)
IMM GRANULOCYTES # BLD: 0.03 10*3/MM3 (ref 0–0.03)
IMM GRANULOCYTES NFR BLD: 0.3 % (ref 0–0.5)
INR PPP: 0.99 (ref 0.9–1.1)
LYMPHOCYTES # BLD AUTO: 3.46 10*3/MM3 (ref 1–3)
LYMPHOCYTES NFR BLD AUTO: 34.4 % (ref 16–46)
MCH RBC QN AUTO: 32.8 PG (ref 27–33)
MCHC RBC AUTO-ENTMCNC: 33.8 G/DL (ref 33–37)
MCV RBC AUTO: 97 FL (ref 80–94)
MONOCYTES # BLD AUTO: 0.83 10*3/MM3 (ref 0.1–0.9)
MONOCYTES NFR BLD AUTO: 8.3 % (ref 0–12)
NEUTROPHILS # BLD AUTO: 4.98 10*3/MM3 (ref 1.4–6.5)
NEUTROPHILS NFR BLD AUTO: 49.5 % (ref 40–75)
OSMOLALITY SERPL CALC.SUM OF ELEC: 287.6 MOSM/KG (ref 273–305)
PLATELET # BLD AUTO: 223 10*3/MM3 (ref 130–400)
PMV BLD AUTO: 10.1 FL (ref 6–10)
POTASSIUM BLD-SCNC: 3.8 MMOL/L (ref 3.5–5.3)
PROT SERPL-MCNC: 6.6 G/DL (ref 6–8)
PROTHROMBIN TIME: 13.3 SECONDS (ref 11–15.4)
RBC # BLD AUTO: 4.06 10*6/MM3 (ref 4.2–5.4)
SODIUM BLD-SCNC: 144 MMOL/L (ref 135–153)
TROPONIN I SERPL-MCNC: 0.01 NG/ML
WBC NRBC COR # BLD: 10.05 10*3/MM3 (ref 4.5–12.5)

## 2018-06-19 PROCEDURE — 85025 COMPLETE CBC W/AUTO DIFF WBC: CPT | Performed by: PHYSICIAN ASSISTANT

## 2018-06-19 PROCEDURE — 71045 X-RAY EXAM CHEST 1 VIEW: CPT

## 2018-06-19 PROCEDURE — 93923 UPR/LXTR ART STDY 3+ LVLS: CPT

## 2018-06-19 PROCEDURE — 96375 TX/PRO/DX INJ NEW DRUG ADDON: CPT

## 2018-06-19 PROCEDURE — 83880 ASSAY OF NATRIURETIC PEPTIDE: CPT | Performed by: PHYSICIAN ASSISTANT

## 2018-06-19 PROCEDURE — 99283 EMERGENCY DEPT VISIT LOW MDM: CPT

## 2018-06-19 PROCEDURE — 93005 ELECTROCARDIOGRAM TRACING: CPT | Performed by: INTERNAL MEDICINE

## 2018-06-19 PROCEDURE — 84484 ASSAY OF TROPONIN QUANT: CPT | Performed by: PHYSICIAN ASSISTANT

## 2018-06-19 PROCEDURE — 85730 THROMBOPLASTIN TIME PARTIAL: CPT | Performed by: PHYSICIAN ASSISTANT

## 2018-06-19 PROCEDURE — 93970 EXTREMITY STUDY: CPT | Performed by: RADIOLOGY

## 2018-06-19 PROCEDURE — 25010000002 LORAZEPAM PER 2 MG: Performed by: INTERNAL MEDICINE

## 2018-06-19 PROCEDURE — 80053 COMPREHEN METABOLIC PANEL: CPT | Performed by: PHYSICIAN ASSISTANT

## 2018-06-19 PROCEDURE — 96365 THER/PROPH/DIAG IV INF INIT: CPT

## 2018-06-19 PROCEDURE — 93970 EXTREMITY STUDY: CPT

## 2018-06-19 PROCEDURE — 93923 UPR/LXTR ART STDY 3+ LVLS: CPT | Performed by: RADIOLOGY

## 2018-06-19 PROCEDURE — 25010000002 HEPARIN (PORCINE) PER 1000 UNITS: Performed by: PHYSICIAN ASSISTANT

## 2018-06-19 PROCEDURE — 36415 COLL VENOUS BLD VENIPUNCTURE: CPT

## 2018-06-19 PROCEDURE — 85610 PROTHROMBIN TIME: CPT | Performed by: PHYSICIAN ASSISTANT

## 2018-06-19 PROCEDURE — 96376 TX/PRO/DX INJ SAME DRUG ADON: CPT

## 2018-06-19 PROCEDURE — 71045 X-RAY EXAM CHEST 1 VIEW: CPT | Performed by: RADIOLOGY

## 2018-06-19 RX ORDER — LORAZEPAM 2 MG/ML
1 INJECTION INTRAMUSCULAR ONCE
Status: COMPLETED | OUTPATIENT
Start: 2018-06-19 | End: 2018-06-19

## 2018-06-19 RX ORDER — HEPARIN SODIUM 5000 [USP'U]/ML
60 INJECTION, SOLUTION INTRAVENOUS; SUBCUTANEOUS ONCE
Status: COMPLETED | OUTPATIENT
Start: 2018-06-19 | End: 2018-06-19

## 2018-06-19 RX ORDER — SODIUM CHLORIDE 0.9 % (FLUSH) 0.9 %
10 SYRINGE (ML) INJECTION AS NEEDED
Status: DISCONTINUED | OUTPATIENT
Start: 2018-06-19 | End: 2018-06-19 | Stop reason: HOSPADM

## 2018-06-19 RX ADMIN — LORAZEPAM 1 MG: 2 INJECTION INTRAMUSCULAR; INTRAVENOUS at 18:22

## 2018-06-19 RX ADMIN — HEPARIN SODIUM 3500 UNITS: 5000 INJECTION, SOLUTION INTRAVENOUS; SUBCUTANEOUS at 18:16

## 2018-06-19 RX ADMIN — HEPARIN SODIUM 12 UNITS/KG/HR: 10000 INJECTION, SOLUTION INTRAVENOUS at 18:23

## 2018-06-19 NOTE — TELEPHONE ENCOUNTER
Let's try to set her up on next available as he lost her previous appointment because she showed up to the wrong clinic.  If she is having severe trouble she is to go to the ER.

## 2018-06-19 NOTE — TELEPHONE ENCOUNTER
Pt called and stated she is having edema all the way up her legs. They were blue last night. I advised pt that I will send  a message and advised her to go tot he ER. She expressed understanding.    She isn't on any water pills.

## 2018-06-19 NOTE — TELEPHONE ENCOUNTER
Made pt an apt on 6.21.18 with  in [place] at 11 am. Called pt and advised her. She expressed understanding.

## 2018-06-21 ENCOUNTER — TELEPHONE (OUTPATIENT)
Dept: CARDIOLOGY | Facility: CLINIC | Age: 73
End: 2018-06-21

## 2018-06-21 NOTE — TELEPHONE ENCOUNTER
"Called patients daughter.  She is concerned with her mother Sirisha.  She was in the ER with leg pain and diagnosed with Arterial occlusion, LE.  Sent to [facility] per ER and patients' daughter is upset that they sent her home from  having no procedure done stating they could do nothing for her.\"  Daughter (Juana) wants to know if anything else can be done, second opinion?  Also she missed appt today with you since being at  today.  Next available appt is Aug.  Would you suggest seeing her sooner? Please advise.   "

## 2018-06-25 NOTE — TELEPHONE ENCOUNTER
"Called patient and given message.  \"They understand more since discussing again with the [facility] DR\"per Juana her daughter.    I made her a follow up appt in July.    "

## 2018-06-25 NOTE — TELEPHONE ENCOUNTER
Peripheral vascular disease is not treated the cardiology clinic.  If [facility] had found significant peripheral vascular disease they would have referred her to vascular surgery.  We cannot provide a second opinion on this condition but can see her on next available appointment for possible referral.

## 2018-06-26 RX ORDER — ANASTROZOLE 1 MG/1
TABLET ORAL
Qty: 30 TABLET | Refills: 5 | Status: SHIPPED | OUTPATIENT
Start: 2018-06-26 | End: 2019-01-22 | Stop reason: SDUPTHER

## 2018-08-16 ENCOUNTER — OFFICE VISIT (OUTPATIENT)
Dept: CARDIOLOGY | Facility: CLINIC | Age: 73
End: 2018-08-16

## 2018-08-16 VITALS
DIASTOLIC BLOOD PRESSURE: 57 MMHG | BODY MASS INDEX: 23.21 KG/M2 | SYSTOLIC BLOOD PRESSURE: 94 MMHG | HEART RATE: 61 BPM | HEIGHT: 63 IN | WEIGHT: 131 LBS

## 2018-08-16 DIAGNOSIS — Z72.0 TOBACCO ABUSE: ICD-10-CM

## 2018-08-16 DIAGNOSIS — I10 ESSENTIAL HYPERTENSION: ICD-10-CM

## 2018-08-16 DIAGNOSIS — I25.10 ARTERIOSCLEROTIC CARDIOVASCULAR DISEASE (ASCVD): ICD-10-CM

## 2018-08-16 DIAGNOSIS — E78.5 DYSLIPIDEMIA: ICD-10-CM

## 2018-08-16 DIAGNOSIS — I73.9 PVD (PERIPHERAL VASCULAR DISEASE) (HCC): Primary | ICD-10-CM

## 2018-08-16 PROCEDURE — 99213 OFFICE O/P EST LOW 20 MIN: CPT | Performed by: INTERNAL MEDICINE

## 2018-08-16 RX ORDER — BUSPIRONE HYDROCHLORIDE 10 MG/1
10 TABLET ORAL 2 TIMES DAILY
Refills: 5 | Status: ON HOLD | COMMUNITY
Start: 2018-07-03 | End: 2019-06-26

## 2018-08-16 NOTE — PROGRESS NOTES
Yasmine Reynolds MD  Sirisha STEVENSON Valdez  1945      Patient Active Problem List   Diagnosis   • Arteriosclerotic cardiovascular disease (ASCVD), s/p stenting in 1999, after a myocardial infarction.    • SSS (sick sinus syndrome), s/p permanent pacemaker implantion,    • Palpitations   • Dizziness   • Dyslipidemia, currently on statin   • Essential hypertension, well-contolled.    • PVD (peripheral vascular disease) (CMS/HCC)   • Swelling of both lower extremities   • Chronic left shoulder pain   • S/P arthroscopy of shoulder   • Coagulopathy (CMS/HCC)   • Bilateral malignant neoplasm of breast in female, estrogen receptor positive (CMS/HCC)   • Tobacco abuse       Dear Yasmine Reynolds MD:    Tom Valdez is a 72 y.o. female with the above medical problems who is here today for follow-up.  According to the patient she has been doing fairly well since her last visit.  She denies any chest or shortness breath, palpitations, orthopnea, PND, lower extremity edema, dizziness or syncope.  She continues to have issues with peripheral vascular disease she is a disease is seen at  but they did not intervene on her.  She also continues to smoke approximately one pack per day in spite of multiple counseling sessions on cessation.         Current Outpatient Prescriptions:   •  anastrozole (ARIMIDEX) 1 MG tablet, TAKE 1 TABLET DAILY, Disp: 30 tablet, Rfl: 5  •  aspirin 81 MG EC tablet, Take 81 mg by mouth daily., Disp: , Rfl:   •  CALCIUM CARBONATE PO, Take  by mouth., Disp: , Rfl:   •  cholecalciferol (VITAMIN D3) 1000 units tablet, Take 1,000 Units by mouth Daily., Disp: , Rfl:   •  cilostazol (PLETAL) 50 MG tablet, TAKE 1 TABLET BY MOUTH 2 TIMES DAILY, Disp: 60 tablet, Rfl: 2  •  diazepam (VALIUM) 10 MG tablet, Take 10 mg by mouth 2 (two) times a day., Disp: , Rfl:   •  HYDROcodone-acetaminophen (NORCO) 7.5-325 MG per tablet, Take 1 tablet by mouth Every 6 (Six) Hours As Needed for Moderate  "Pain ., Disp: , Rfl:   •  metoprolol tartrate (LOPRESSOR) 50 MG tablet, Take 50 mg by mouth 2 (two) times a day., Disp: , Rfl:   •  nitroglycerin (NITROSTAT) 0.4 MG SL tablet, Place 0.4 mg under the tongue Every 5 (Five) Minutes As Needed for Chest Pain. Take no more than 3 doses in 15 minutes., Disp: , Rfl:   •  rosuvastatin (CRESTOR) 40 MG tablet, Take 40 mg by mouth Every Night., Disp: , Rfl: 5  •  busPIRone (BUSPAR) 10 MG tablet, Take 10 mg by mouth 2 (Two) Times a Day., Disp: , Rfl: 5    The following portions of the patient's history were reviewed and updated as appropriate: allergies, current medications, past family history, past medical history, past social history, past surgical history and problem list.    Review of Systems   Constitution: Negative for chills, diaphoresis and fever.   HENT: Negative for congestion, ear discharge, ear pain and sore throat.    Eyes: Negative for blurred vision, pain and redness.   Cardiovascular: Negative for chest pain, claudication, dyspnea on exertion, irregular heartbeat, leg swelling, near-syncope, orthopnea, palpitations, paroxysmal nocturnal dyspnea and syncope.   Respiratory: Negative for cough, hemoptysis and shortness of breath.    Endocrine: Negative for cold intolerance and heat intolerance.   Hematologic/Lymphatic: Does not bruise/bleed easily.   Skin: Negative for rash.   Musculoskeletal: Positive for joint pain and stiffness. Negative for arthritis, back pain and myalgias.   Gastrointestinal: Negative for abdominal pain, constipation, diarrhea, hematemesis, hematochezia, melena, nausea and vomiting.   Genitourinary: Negative for dysuria and hematuria.   Neurological: Negative for dizziness, focal weakness, light-headedness and numbness.   Psychiatric/Behavioral: Negative for depression. The patient is not nervous/anxious.        Objective   Blood pressure 94/57, pulse 61, height 160 cm (63\"), weight 59.4 kg (131 lb).    Physical Exam   Constitutional: She is " oriented to person, place, and time. She appears well-developed and well-nourished.   White female sitting comfortably on chair.   HENT:   Mouth/Throat: Oropharynx is clear and moist.   Eyes: Pupils are equal, round, and reactive to light. EOM are normal.   Neck: Neck supple. No JVD present. No tracheal deviation present. No thyromegaly present.   Cardiovascular: Normal rate, regular rhythm, S1 normal and S2 normal.  Exam reveals no gallop and no friction rub.    No murmur heard.  Pulses:       Posterior tibial pulses are 2+ on the right side, and 1+ on the left side.   Pulmonary/Chest: Effort normal. No respiratory distress. She has no wheezes. She has no rales.   Abdominal: Soft. Bowel sounds are normal. She exhibits no mass. There is no tenderness.   Musculoskeletal: Normal range of motion. She exhibits no edema.   Lymphadenopathy:     She has no cervical adenopathy.   Neurological: She is alert and oriented to person, place, and time.   Skin: Skin is warm and dry. No rash noted.   Psychiatric: She has a normal mood and affect.       Procedures    Assessment/Plan     1.  Peripheral vascular disease: Patient with history of peripheral vascular disease status post femorofemoral bypass as well as bilateral lower extremity sent placement.  The patient has decreased pulses especially in the left side as well as decreased temperature on the leg.  We'll need to consider repeat ALICE if she presented with any further symptoms.  I have advised of the importance of tobacco cessation.    2.  Coronary artery disease: Patient with history of coronary artery disease currently on aspirin,beta blocker and statin.  Unable to start ACE inhibitor due to renal dysfunction.  At this point we'll continue current regimen.    3.  Hypertension: Patient is a history of hypertension currently well controlled on current regimen.     4.  Dyslipidemia: Patient with history of dyslipidemia.  The patient's last lipid panel showed adequate  control. 's point we will continue current regimen.    5. Tobacco abuse: The patient once again states she is continuing to smoke in spite of multiple counseling sessions on tobacco cessation.  I once again emphasized the importance of discontinue smoking with her history of coronary artery disease as well as peripheral vascular disease.  She has been informed that this could lead to limb loss.     Diagnosis Plan   1. PVD (peripheral vascular disease) (CMS/HCC)     2. Arteriosclerotic cardiovascular disease (ASCVD), s/p stenting in 1999, after a myocardial infarction.      3. Essential hypertension, well-contolled.      4. Dyslipidemia, currently on statin     5. Tobacco abuse            Return in about 3 months (around 11/16/2018).    I appreciate the opportunity to participate in this patient's cardiovascular care.    Best Regards    Jose F Be

## 2018-09-13 DIAGNOSIS — C50.111 BILATERAL MALIGNANT NEOPLASM OF CENTRAL PORTION OF BREAST IN FEMALE, UNSPECIFIED ESTROGEN RECEPTOR STATUS (HCC): Primary | ICD-10-CM

## 2018-09-13 DIAGNOSIS — C50.112 BILATERAL MALIGNANT NEOPLASM OF CENTRAL PORTION OF BREAST IN FEMALE, UNSPECIFIED ESTROGEN RECEPTOR STATUS (HCC): Primary | ICD-10-CM

## 2018-09-17 ENCOUNTER — TELEPHONE (OUTPATIENT)
Dept: CARDIOLOGY | Facility: CLINIC | Age: 73
End: 2018-09-17

## 2018-09-17 RX ORDER — CILOSTAZOL 50 MG/1
50 TABLET ORAL 2 TIMES DAILY
Qty: 60 TABLET | Refills: 3 | Status: SHIPPED | OUTPATIENT
Start: 2018-09-17 | End: 2018-11-16

## 2018-09-18 ENCOUNTER — HOSPITAL ENCOUNTER (OUTPATIENT)
Dept: MAMMOGRAPHY | Facility: HOSPITAL | Age: 73
Discharge: HOME | End: 2018-09-18
Admitting: SURGERY

## 2018-09-18 DIAGNOSIS — C50.111 BILATERAL MALIGNANT NEOPLASM OF CENTRAL PORTION OF BREAST IN FEMALE, UNSPECIFIED ESTROGEN RECEPTOR STATUS (HCC): ICD-10-CM

## 2018-09-18 DIAGNOSIS — C50.112 BILATERAL MALIGNANT NEOPLASM OF CENTRAL PORTION OF BREAST IN FEMALE, UNSPECIFIED ESTROGEN RECEPTOR STATUS (HCC): ICD-10-CM

## 2018-09-18 PROCEDURE — G0279 TOMOSYNTHESIS, MAMMO: HCPCS | Performed by: RADIOLOGY

## 2018-09-18 PROCEDURE — 77066 DX MAMMO INCL CAD BI: CPT | Performed by: RADIOLOGY

## 2018-09-18 PROCEDURE — 77066 DX MAMMO INCL CAD BI: CPT

## 2018-10-10 ENCOUNTER — OFFICE VISIT (OUTPATIENT)
Dept: SURGERY | Facility: CLINIC | Age: 73
End: 2018-10-10

## 2018-10-10 VITALS
HEART RATE: 66 BPM | BODY MASS INDEX: 22.71 KG/M2 | DIASTOLIC BLOOD PRESSURE: 62 MMHG | WEIGHT: 128.2 LBS | SYSTOLIC BLOOD PRESSURE: 127 MMHG | HEIGHT: 63 IN

## 2018-10-10 DIAGNOSIS — M85.80 OSTEOPENIA, UNSPECIFIED LOCATION: Primary | ICD-10-CM

## 2018-10-10 DIAGNOSIS — C50.912 BILATERAL MALIGNANT NEOPLASM OF BREAST IN FEMALE, ESTROGEN RECEPTOR POSITIVE, UNSPECIFIED SITE OF BREAST (HCC): ICD-10-CM

## 2018-10-10 DIAGNOSIS — C50.911 BILATERAL MALIGNANT NEOPLASM OF BREAST IN FEMALE, ESTROGEN RECEPTOR POSITIVE, UNSPECIFIED SITE OF BREAST (HCC): ICD-10-CM

## 2018-10-10 DIAGNOSIS — Z17.0 BILATERAL MALIGNANT NEOPLASM OF BREAST IN FEMALE, ESTROGEN RECEPTOR POSITIVE, UNSPECIFIED SITE OF BREAST (HCC): ICD-10-CM

## 2018-10-10 DIAGNOSIS — Z79.811 AROMATASE INHIBITOR USE: ICD-10-CM

## 2018-10-10 PROCEDURE — 93702 BIS XTRACELL FLUID ANALYSIS: CPT | Performed by: SURGERY

## 2018-10-10 PROCEDURE — 99214 OFFICE O/P EST MOD 30 MIN: CPT | Performed by: SURGERY

## 2018-10-10 NOTE — PROGRESS NOTES
Subjective   Sirisha Valdez is a 72 y.o. female is here today for follow-up breast care and mammogram review.    History of Present Illness  Ms. Valdez was seen in the office today for breast cancer follow-up.  She is status post a left breast lumpectomy and sentinel lymph node biopsy and a right breast lumpectomy on 12/12/17.  Final pathology demonstrated left T1bN0 infiltrating ductal carcinoma, ER positive, LA positive, HER-2 negative.  The anterior margin was positive for invasive tumor and other margins were positive for DCIS.  The right lumpectomy demonstrated DCIS, ER positive, LA positive with close but clear margins.  The patient underwent margin reexcision of the left and the right breast on 12/27/17.  The right lumpectomy showed no residual disease and the left side demonstrated a very small focus of DCIS.   Due to the patient's age and clear margin status the decision was made to forego radiation.  The patient was started on anastrozole in January.  She states she is tolerating it well with no side effects.  The patient has no complaints referable to the breast.  She denies any arm swelling or axillary adenopathy.  She denies any new bone or joint pain or other symptoms of metastatic disease  The patient had a bone density study done on 1/29/18 which demonstrated normal lumbar spine, normal left hip, left radius with a T score of -1.1  Allergies   Allergen Reactions   • Promethazine      hypotension         Current Outpatient Prescriptions   Medication Sig Dispense Refill   • anastrozole (ARIMIDEX) 1 MG tablet TAKE 1 TABLET DAILY 30 tablet 5   • aspirin 81 MG EC tablet Take 81 mg by mouth daily.     • busPIRone (BUSPAR) 10 MG tablet Take 10 mg by mouth 2 (Two) Times a Day.  5   • CALCIUM CARBONATE PO Take  by mouth.     • cholecalciferol (VITAMIN D3) 1000 units tablet Take 1,000 Units by mouth Daily.     • cilostazol (PLETAL) 50 MG tablet Take 1 tablet by mouth 2 (Two) Times a Day. 60 tablet 3   •  diazepam (VALIUM) 10 MG tablet Take 10 mg by mouth 2 (two) times a day.     • HYDROcodone-acetaminophen (NORCO) 7.5-325 MG per tablet Take 1 tablet by mouth Every 6 (Six) Hours As Needed for Moderate Pain .     • metoprolol tartrate (LOPRESSOR) 50 MG tablet Take 50 mg by mouth 2 (two) times a day.     • nitroglycerin (NITROSTAT) 0.4 MG SL tablet Place 0.4 mg under the tongue Every 5 (Five) Minutes As Needed for Chest Pain. Take no more than 3 doses in 15 minutes.     • rosuvastatin (CRESTOR) 40 MG tablet Take 40 mg by mouth Every Night.  5     No current facility-administered medications for this visit.      Past Medical History:   Diagnosis Date   • Arthritis    • Breast cancer (CMS/HCC) 11/28/2017    bilateral   • Coronary artery disease     3 HEART STENTS   • Gastroesophageal reflux    • Glaucoma    • Hypertension    • MI (myocardial infarction) (CMS/HCC)    • Migraine    • Peripheral arterial disease (CMS/HCC)     KIM LEG STENTS       ANDFEM POP   • Peripheral vascular disease (CMS/HCC)    • SSS (sick sinus syndrome) (CMS/[facility])    • Status post placement of cardiac pacemaker    • Wears dentures    • Wears glasses      Past Surgical History:   Procedure Laterality Date   • BREAST LUMPECTOMY Right 12/12/2017    Procedure: BREAST LUMPECTOMY WITH NEEDLE LOCALIZATION;  Surgeon: Lida Babin MD;  Location: [facility];  Service:    • BREAST LUMPECTOMY Bilateral 12/27/2017    Procedure: right breast re-excision  left breast re-excision with 3 cm inferior and superior advancement flap;  Surgeon: Lida Babin MD;  Location: [facility] OR;  Service:    • BREAST LUMPECTOMY WITH SENTINEL NODE BIOPSY AND AXILLARY NODE DISSECTION Left 12/12/2017    Procedure: Left BREAST LUMPECTOMY WITH SENTINEL NODE BIOPSY  Isotope and Lymphazurin injection left breast. Left breast preoperative needle localization;  Surgeon: Lida Babin MD;  Location: [facility] OR;  Service:    • BREAST SURGERY      Mass Removal.   • CARDIAC  "CATHETERIZATION     • CARPAL TUNNEL RELEASE     • CATARACT EXTRACTION     • FEMORAL DISTAL BYPASS     • HIP SURGERY     • HYSTERECTOMY     • PACEMAKER IMPLANTATION     • SHOULDER ARTHROSCOPY W/ ROTATOR CUFF REPAIR Left 3/8/2017    Procedure: LEFT SHOULDER ARTHROSCOPY, ACROMIOPLASTY, SUBACROMIC DEBRIDMENT;  Surgeon: Tesfaye Polo MD;  Location: [facility];  Service:    • SHOULDER SURGERY       The following portions of the patient's history were reviewed and updated as appropriate: allergies, current medications, past family history, past medical history, past social history, past surgical history and problem list.    Review of Systems  General: negative  Integumentary: negative  Eyes: glasses  ENT: negative  Respiratory: negative  Gastrointestinal: loss of appetite and diarrhea  Cardiovascular: negative  Neurological: negative  Psychiatric: anxiety  Hematologic/Lymphatic: easy bruising  Genitourinary: negative  Musculoskeletal: painful joints  Endocrine: negative  Breasts: negative    Objective   /62 (BP Location: Left arm)   Pulse 66   Ht 160 cm (63\")   Wt 58.2 kg (128 lb 3.2 oz)   BMI 22.71 kg/m²    Physical Exam  General:  This is a WD WN white female in no acute distress  Vital signs stable, afebrile  HEENT exam:  WNL. Sclera are anicteric.  EOMI  Neck:  supple, FROM.  No JVD.  Trachea midline.  No palpable thyroid nodules. No supraclavicular adenopathy  Lungs:  Respiratory effort normal. Auscultation: Clear, without wheezes, rhonchi, rales  Heart:  Regular rate and rhythm, without murmur, gallop, rub.  No pedal edema  Breasts: On visual inspection the breasts are symmetrical.  Examination of the right breast demonstrates a healed circumareolar scar.  There is no discrete mass, skin change, axillary adenopathy.  Examination of the left breast demonstrates a healed circumareolar scar.  There is no discrete mass, skin change, or axillary adenopathy.  Abdomen: Nontender, without " hepatosplenomegaly  Musculoskeletal:  muscle strength/tone is normal.    Psyc:  alert, oriented x 3.  Mood and affect are appropriate  skin:  Warm with good turgor.  Without rash or lesion  extremities:  Examination of the extremities revealed no cyanosis, clubbing or edema.    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  Grade 1 infiltrating ductal carcinoma of the left breast T1b N0 MX, ER positive, NJ positive  Both sites with clear margins on reexcision  Osteopenia     Plan:  Continue anastrozole  Continue calcium and vitamin D  Bilateral mammogram in March, 2019 with return to the office following           Discussion/Summary    Patient's Body mass index is 22.71 kg/m². BMI is within normal parameters. No follow-up required.       Errors in dictation may reflect use of voice recognition software and not all errors in transcription may have been detected prior to signing.    Future Appointments  Date Time Provider Department Center   11/12/2018 12:40 PM Jose G Contreras PA-C [facility] None

## 2018-11-16 ENCOUNTER — OFFICE VISIT (OUTPATIENT)
Dept: CARDIOLOGY | Facility: CLINIC | Age: 73
End: 2018-11-16

## 2018-11-16 VITALS
BODY MASS INDEX: 23.04 KG/M2 | OXYGEN SATURATION: 97 % | DIASTOLIC BLOOD PRESSURE: 56 MMHG | HEIGHT: 63 IN | WEIGHT: 130 LBS | HEART RATE: 78 BPM | SYSTOLIC BLOOD PRESSURE: 107 MMHG

## 2018-11-16 DIAGNOSIS — I10 ESSENTIAL HYPERTENSION: ICD-10-CM

## 2018-11-16 DIAGNOSIS — I49.5 SSS (SICK SINUS SYNDROME) (HCC): ICD-10-CM

## 2018-11-16 DIAGNOSIS — I25.10 ARTERIOSCLEROTIC CARDIOVASCULAR DISEASE (ASCVD): Primary | ICD-10-CM

## 2018-11-16 DIAGNOSIS — I73.9 PVD (PERIPHERAL VASCULAR DISEASE) (HCC): ICD-10-CM

## 2018-11-16 DIAGNOSIS — E78.5 DYSLIPIDEMIA: ICD-10-CM

## 2018-11-16 PROCEDURE — 99213 OFFICE O/P EST LOW 20 MIN: CPT | Performed by: PHYSICIAN ASSISTANT

## 2018-11-16 RX ORDER — CILOSTAZOL 100 MG/1
100 TABLET ORAL 2 TIMES DAILY
Qty: 60 TABLET | Refills: 6 | Status: ON HOLD | OUTPATIENT
Start: 2018-11-16 | End: 2019-06-27 | Stop reason: SDUPTHER

## 2018-11-16 NOTE — PROGRESS NOTES
Yasmine Reynolds MD  Sirisha Valdez  1945  11/16/2018    Patient Active Problem List   Diagnosis   • Arteriosclerotic cardiovascular disease (ASCVD), s/p stenting in 1999, after a myocardial infarction.    • SSS (sick sinus syndrome), s/p permanent pacemaker implantion,    • Palpitations   • Dizziness   • Dyslipidemia, currently on statin   • Essential hypertension, well-contolled.    • PVD (peripheral vascular disease) (CMS/HCC)   • Swelling of both lower extremities   • Chronic left shoulder pain   • S/P arthroscopy of shoulder   • Coagulopathy (CMS/HCC)   • Bilateral malignant neoplasm of breast in female, estrogen receptor positive (CMS/HCC)   • Tobacco abuse   • Aromatase inhibitor use   • Osteopenia       Dear Yasmine Reynolds MD:    Subjective       History of Present Illness:    Chief Complaint   Patient presents with   • PVD     Follow up   • ASCVD       Sirisha Valdez is a pleasant 73 y.o. female with a past medical history significant for peripheral arterial disease status post tomorrow for more bypass as well as bilateral lower extremity stent placement, ASCVD status post stenting in 1993, sick sinus syndrome status post permanent pacemaker implantation, essential hypertension, and dyslipidemia.  Patient comes in for routine cardiology follow-up.    Overall patient states that she's been doing well.  She denies any chest pain, shortness of breath, weakness, fatigue, dizziness, or syncope.  She also denies any orthopnea, PND, or pedal edema.  Her only complaint today is that occasionally her legs will hurt from her PAD in which she is asking to have her Pletal increased.      Allergies   Allergen Reactions   • Promethazine      hypotension   :      Current Outpatient Medications:   •  anastrozole (ARIMIDEX) 1 MG tablet, TAKE 1 TABLET DAILY, Disp: 30 tablet, Rfl: 5  •  aspirin 81 MG EC tablet, Take 81 mg by mouth daily., Disp: , Rfl:   •  busPIRone (BUSPAR) 10 MG tablet, Take 10 mg by  mouth 2 (Two) Times a Day., Disp: , Rfl: 5  •  CALCIUM CARBONATE PO, Take  by mouth., Disp: , Rfl:   •  cholecalciferol (VITAMIN D3) 1000 units tablet, Take 1,000 Units by mouth Daily., Disp: , Rfl:   •  cilostazol (PLETAL) 100 MG tablet, Take 1 tablet by mouth 2 (Two) Times a Day., Disp: 60 tablet, Rfl: 6  •  diazepam (VALIUM) 10 MG tablet, Take 10 mg by mouth 2 (two) times a day., Disp: , Rfl:   •  HYDROcodone-acetaminophen (NORCO) 7.5-325 MG per tablet, Take 1 tablet by mouth Every 6 (Six) Hours As Needed for Moderate Pain ., Disp: , Rfl:   •  metoprolol tartrate (LOPRESSOR) 50 MG tablet, Take 50 mg by mouth 2 (two) times a day., Disp: , Rfl:   •  nitroglycerin (NITROSTAT) 0.4 MG SL tablet, Place 0.4 mg under the tongue Every 5 (Five) Minutes As Needed for Chest Pain. Take no more than 3 doses in 15 minutes., Disp: , Rfl:   •  rosuvastatin (CRESTOR) 40 MG tablet, Take 40 mg by mouth Every Night., Disp: , Rfl: 5      The following portions of the patient's history were reviewed and updated as appropriate: allergies, current medications, past family history, past medical history, past social history, past surgical history and problem list.    Social History     Tobacco Use   • Smoking status: Current Every Day Smoker     Packs/day: 1.00     Years: 54.00     Pack years: 54.00     Types: Cigarettes     Start date: 1961   • Smokeless tobacco: Never Used   Substance Use Topics   • Alcohol use: No   • Drug use: No       Review of Systems   Constitution: Negative for weakness and malaise/fatigue.   Cardiovascular: Negative for chest pain, dyspnea on exertion and irregular heartbeat.   Respiratory: Negative for cough and shortness of breath.    Hematologic/Lymphatic: Negative for bleeding problem. Does not bruise/bleed easily.   Gastrointestinal: Negative for nausea and vomiting.       Objective   Vitals:    11/16/18 1257   BP: 107/56   BP Location: Left arm   Patient Position: Sitting   Cuff Size: Adult   Pulse: 78  "  SpO2: 97%   Weight: 59 kg (130 lb)   Height: 160 cm (63\")     Body mass index is 23.03 kg/m².        Physical Exam   Constitutional: She is oriented to person, place, and time. She appears well-developed and well-nourished. No distress.   HENT:   Head: Normocephalic and atraumatic.   Cardiovascular: Normal rate, regular rhythm, normal heart sounds and intact distal pulses.   Pulmonary/Chest: Effort normal and breath sounds normal. No respiratory distress.   Musculoskeletal: She exhibits edema (1+ pedal edema bilaterally).   Neurological: She is alert and oriented to person, place, and time.   Skin: She is not diaphoretic.       Lab Results   Component Value Date     06/19/2018    K 3.8 06/19/2018     (H) 06/19/2018    CO2 25.7 06/19/2018    BUN 9 06/19/2018    CREATININE 1.14 06/19/2018    GLUCOSE 135 (H) 06/19/2018    CALCIUM 9.4 06/19/2018    AST 25 06/19/2018    ALT 11 06/19/2018    ALKPHOS 70 06/19/2018    LABIL2 1.4 (L) 01/04/2016     Lab Results   Component Value Date    CKTOTAL 60 01/02/2016     Lab Results   Component Value Date    WBC 10.05 06/19/2018    HGB 13.3 06/19/2018    HCT 39.4 06/19/2018     06/19/2018     Lab Results   Component Value Date    INR 0.99 06/19/2018    INR <0.90 01/02/2016     Lab Results   Component Value Date    MG 2.2 01/02/2016     Lab Results   Component Value Date    TSH 2.355 01/02/2016    CHLPL 146 01/02/2016    TRIG 105 10/13/2016    HDL 52 (L) 10/13/2016    LDL 57 10/13/2016      Lab Results   Component Value Date    .0 (H) 06/19/2018       During this visit the following were done:  Labs Reviewed [x]    Labs Ordered []    Radiology Reports Reviewed [x]    Radiology Ordered []    PCP Records Reviewed []    Referring Provider Records Reviewed []    ER Records Reviewed []    Hospital Records Reviewed []    History Obtained From Family []    Radiology Images Reviewed []    Other Reviewed []    Records Requested []     "   Procedures      Assessment/Plan    Diagnosis Plan   1. Arteriosclerotic cardiovascular disease (ASCVD), s/p stenting in 1999, after a myocardial infarction.      2. SSS (sick sinus syndrome), s/p permanent pacemaker implantion,      3. Essential hypertension, well-contolled.      4. PVD (peripheral vascular disease) (CMS/HCC)     5. Dyslipidemia, currently on statin                  Recommendations:  1. For her PAD at increased her platelet 100 mg twice a day  2. Continue Crestor, metoprolol, and aspirin follow-up in 6 months.    Patient's Body mass index is 23.03 kg/m². BMI is within normal parameters. No follow-up required.       Return in about 6 months (around 5/16/2019).    As always, I appreciate very much the opportunity to participate in the cardiovascular care of your patients.      With Best Regards,    NADEGE Priest disclaimer:  Much of this encounter note is an electronic transcription/translation of spoken language to printed text. The electronic translation of spoken language may permit erroneous, or at times, nonsensical words or phrases to be inadvertently transcribed; Although I have reviewed the note for such errors, some may still exist.

## 2019-01-17 RX ORDER — CILOSTAZOL 50 MG/1
TABLET ORAL
Qty: 60 TABLET | Refills: 3 | OUTPATIENT
Start: 2019-01-17

## 2019-01-22 DIAGNOSIS — C50.112 MALIGNANT NEOPLASM OF CENTRAL PORTION OF BOTH BREASTS IN FEMALE, ESTROGEN RECEPTOR POSITIVE (HCC): Primary | ICD-10-CM

## 2019-01-22 DIAGNOSIS — C50.111 MALIGNANT NEOPLASM OF CENTRAL PORTION OF BOTH BREASTS IN FEMALE, ESTROGEN RECEPTOR POSITIVE (HCC): Primary | ICD-10-CM

## 2019-01-22 DIAGNOSIS — Z17.0 MALIGNANT NEOPLASM OF CENTRAL PORTION OF BOTH BREASTS IN FEMALE, ESTROGEN RECEPTOR POSITIVE (HCC): Primary | ICD-10-CM

## 2019-01-22 RX ORDER — ANASTROZOLE 1 MG/1
1 TABLET ORAL DAILY
Qty: 30 TABLET | Refills: 4 | Status: SHIPPED | OUTPATIENT
Start: 2019-01-22 | End: 2019-05-01 | Stop reason: SDUPTHER

## 2019-01-30 RX ORDER — ANASTROZOLE 1 MG/1
TABLET ORAL
Qty: 30 TABLET | Refills: 5 | Status: SHIPPED | OUTPATIENT
Start: 2019-01-30 | End: 2020-01-09 | Stop reason: SDUPTHER

## 2019-02-14 ENCOUNTER — CLINICAL SUPPORT (OUTPATIENT)
Dept: CARDIOLOGY | Facility: CLINIC | Age: 74
End: 2019-02-14

## 2019-02-14 DIAGNOSIS — I49.5 SSS (SICK SINUS SYNDROME) (HCC): Primary | ICD-10-CM

## 2019-02-14 PROCEDURE — 93288 INTERROG EVL PM/LDLS PM IP: CPT | Performed by: INTERNAL MEDICINE

## 2019-04-23 ENCOUNTER — APPOINTMENT (OUTPATIENT)
Dept: MAMMOGRAPHY | Facility: HOSPITAL | Age: 74
End: 2019-04-23

## 2019-04-24 ENCOUNTER — HOSPITAL ENCOUNTER (OUTPATIENT)
Dept: MAMMOGRAPHY | Facility: HOSPITAL | Age: 74
Discharge: HOME | End: 2019-04-24
Admitting: RADIOLOGY

## 2019-04-24 DIAGNOSIS — Z09 FOLLOW-UP EXAM, 3-6 MONTHS SINCE PREVIOUS EXAM: ICD-10-CM

## 2019-04-24 PROCEDURE — G0279 TOMOSYNTHESIS, MAMMO: HCPCS

## 2019-04-24 PROCEDURE — G0279 TOMOSYNTHESIS, MAMMO: HCPCS | Performed by: RADIOLOGY

## 2019-04-24 PROCEDURE — 77066 DX MAMMO INCL CAD BI: CPT | Performed by: RADIOLOGY

## 2019-04-24 PROCEDURE — 77066 DX MAMMO INCL CAD BI: CPT

## 2019-05-01 ENCOUNTER — OFFICE VISIT (OUTPATIENT)
Dept: SURGERY | Facility: CLINIC | Age: 74
End: 2019-05-01

## 2019-05-01 VITALS
DIASTOLIC BLOOD PRESSURE: 62 MMHG | HEIGHT: 63 IN | BODY MASS INDEX: 21.37 KG/M2 | HEART RATE: 75 BPM | SYSTOLIC BLOOD PRESSURE: 122 MMHG | WEIGHT: 120.6 LBS

## 2019-05-01 DIAGNOSIS — M85.80 OSTEOPENIA, UNSPECIFIED LOCATION: ICD-10-CM

## 2019-05-01 DIAGNOSIS — C50.111 MALIGNANT NEOPLASM OF CENTRAL PORTION OF BOTH BREASTS IN FEMALE, ESTROGEN RECEPTOR POSITIVE (HCC): Primary | ICD-10-CM

## 2019-05-01 DIAGNOSIS — C50.112 MALIGNANT NEOPLASM OF CENTRAL PORTION OF BOTH BREASTS IN FEMALE, ESTROGEN RECEPTOR POSITIVE (HCC): Primary | ICD-10-CM

## 2019-05-01 DIAGNOSIS — Z17.0 MALIGNANT NEOPLASM OF CENTRAL PORTION OF BOTH BREASTS IN FEMALE, ESTROGEN RECEPTOR POSITIVE (HCC): Primary | ICD-10-CM

## 2019-05-01 PROCEDURE — 99214 OFFICE O/P EST MOD 30 MIN: CPT | Performed by: SURGERY

## 2019-05-01 PROCEDURE — 93702 BIS XTRACELL FLUID ANALYSIS: CPT | Performed by: SURGERY

## 2019-05-01 NOTE — PROGRESS NOTES
Subjective   Sirisha Valdez is a 73 y.o. female is here today for follow-up breast care and mammogram review.    History of Present Illness  She is status post a left breast lumpectomy and sentinel lymph node biopsy and a right breast lumpectomy on 12/12/17.  Final pathology demonstrated left T1bN0 infiltrating ductal carcinoma, ER positive, SC positive, HER-2 negative.  The anterior margin was positive for invasive tumor and other margins were positive for DCIS.  The right lumpectomy demonstrated DCIS, ER positive, SC positive with close but clear margins.  The patient underwent margin reexcision of the left and the right breast on 12/27/17.  The right lumpectomy showed no residual disease and the left side demonstrated a very small focus of DCIS.   Due to the patient's age and clear margin status the decision was made to forego radiation.  The patient was started on anastrozole in January.  She states she is tolerating it well with no side effects.  The patient has no complaints referable to the breast.  She denies any arm swelling or axillary adenopathy.  She denies any new bone or joint pain or other symptoms of metastatic disease  The patient had a bone density study done on 1/29/18 which demonstrated normal lumbar spine, normal left hip, left radius with a T score of -1.1  Sirisha presents to the office today having had a bilateral mammogram on 4/24/19 which demonstrated a slight increased in density which was thought presumed to patient's weight loss secondary to pneumonia/bronchitis.  Recommendation was for a 6-month bilateral mammogram.  Allergies   Allergen Reactions   • Promethazine      hypotension         Current Outpatient Medications   Medication Sig Dispense Refill   • anastrozole (ARIMIDEX) 1 MG tablet TAKE 1 TABLET DAILY 30 tablet 5   • aspirin 81 MG EC tablet Take 81 mg by mouth daily.     • busPIRone (BUSPAR) 10 MG tablet Take 10 mg by mouth 2 (Two) Times a Day.  5   • CALCIUM CARBONATE PO Take   by mouth.     • cholecalciferol (VITAMIN D3) 1000 units tablet Take 1,000 Units by mouth Daily.     • cilostazol (PLETAL) 100 MG tablet Take 1 tablet by mouth 2 (Two) Times a Day. 60 tablet 6   • diazepam (VALIUM) 10 MG tablet Take 10 mg by mouth 2 (two) times a day.     • HYDROcodone-acetaminophen (NORCO) 7.5-325 MG per tablet Take 1 tablet by mouth Every 6 (Six) Hours As Needed for Moderate Pain .     • metoprolol tartrate (LOPRESSOR) 50 MG tablet Take 50 mg by mouth 2 (two) times a day.     • nitroglycerin (NITROSTAT) 0.4 MG SL tablet Place 0.4 mg under the tongue Every 5 (Five) Minutes As Needed for Chest Pain. Take no more than 3 doses in 15 minutes.     • rosuvastatin (CRESTOR) 40 MG tablet Take 40 mg by mouth Every Night.  5     No current facility-administered medications for this visit.      Past Medical History:   Diagnosis Date   • Arthritis    • Breast cancer (CMS/HCC) 11/28/2017    bilateral   • Coronary artery disease     3 HEART STENTS   • Gastroesophageal reflux    • Glaucoma    • Hypertension    • MI (myocardial infarction) (CMS/HCC)    • Migraine    • Peripheral arterial disease (CMS/HCC)     KIM LEG STENTS       ANDFEM POP   • Peripheral vascular disease (CMS/HCC)    • SSS (sick sinus syndrome) (CMS/HCC)    • Status post placement of cardiac pacemaker    • Wears dentures    • Wears glasses      Past Surgical History:   Procedure Laterality Date   • BREAST LUMPECTOMY Right 12/12/2017    Procedure: BREAST LUMPECTOMY WITH NEEDLE LOCALIZATION;  Surgeon: Lida Babin MD;  Location: [facility] OR;  Service:    • BREAST LUMPECTOMY Bilateral 12/27/2017    Procedure: right breast re-excision  left breast re-excision with 3 cm inferior and superior advancement flap;  Surgeon: Lida Babin MD;  Location: [facility] OR;  Service:    • BREAST LUMPECTOMY WITH SENTINEL NODE BIOPSY AND AXILLARY NODE DISSECTION Left 12/12/2017    Procedure: Left BREAST LUMPECTOMY WITH SENTINEL NODE BIOPSY  Isotope and  "Lymphazurin injection left breast. Left breast preoperative needle localization;  Surgeon: Lida Babin MD;  Location: [facility];  Service:    • BREAST SURGERY      Mass Removal.   • CARDIAC CATHETERIZATION     • CARPAL TUNNEL RELEASE     • CATARACT EXTRACTION     • FEMORAL DISTAL BYPASS     • HIP SURGERY     • HYSTERECTOMY     • PACEMAKER IMPLANTATION     • SHOULDER ARTHROSCOPY W/ ROTATOR CUFF REPAIR Left 3/8/2017    Procedure: LEFT SHOULDER ARTHROSCOPY, ACROMIOPLASTY, SUBACROMIC DEBRIDMENT;  Surgeon: Tesfaye Polo MD;  Location: [facility];  Service:    • SHOULDER SURGERY       The following portions of the patient's history were reviewed and updated as appropriate: allergies, current medications, past family history, past medical history, past social history, past surgical history and problem list.    Review of Systems  General: negative  Integumentary: negative  Eyes: negative  ENT: negative  Respiratory: chronic cough and pneumonia  Gastrointestinal: loss of appetite  Cardiovascular: negative  Neurological: negative  Psychiatric: depression  Hematologic/Lymphatic: easy bruising  Genitourinary: negative  Musculoskeletal: back pain  Endocrine: negative  Breasts: negative    Objective   /62 (BP Location: Left arm)   Pulse 75   Ht 160 cm (63\")   Wt 54.7 kg (120 lb 9.6 oz)   BMI 21.36 kg/m²    Physical Exam was performed on 5/1/2019  General:  This is a WD WN white female in no acute distress  Vital signs stable, afebrile  HEENT exam:  WNL. Sclera are anicteric.  EOMI  Neck:  supple, FROM.  No JVD.  Trachea midline.  No palpable thyroid nodules. No supraclavicular adenopathy  Lungs:  Respiratory effort normal. Auscultation: Clear, without wheezes, rhonchi, rales  Heart:  Regular rate and rhythm, without murmur, gallop, rub.  No pedal edema  Breasts: On visual inspection the breasts are symmetrical.  Examination of the right breast demonstrates a healed circumareolar scar.  There is no " discrete mass, skin change, axillary adenopathy.  Examination of the left breast demonstrates a healed circumareolar scar.  There is no discrete mass, skin change, or axillary adenopathy.  Abdomen: Nontender, without hepatosplenomegaly  Musculoskeletal:  muscle strength/tone is normal.    Psyc:  alert, oriented x 3.  Mood and affect are appropriate  skin:  Warm with good turgor.  Without rash or lesion  extremities:  Examination of the extremities revealed no cyanosis, clubbing or edema.  Results/Data  Mammogram reports and images were reviewed and I agree with the assessment    Procedures   L dex was performed and was 9.1, with the prior on 10/10/2018 being 3.6  Assessment/Plan   Low grade DCIS of the right breast.  Grade 1 infiltrating ductal carcinoma of the left breast T1b N0 MX, ER positive, MA positive  Both sites with clear margins on reexcision  Osteopenia     Plan:  Continue anastrozole  Continue calcium and vitamin D  Bilateral mammogram in October 2019 with return to the office following         Discussion/Summary    Patient's Body mass index is 21.36 kg/m². BMI is within normal parameters. No follow-up required..       Errors in dictation may reflect use of voice recognition software and not all errors in transcription may have been detected prior to signing.    Future Appointments   Date Time Provider Department Center   5/16/2019 12:00 PM Jose G Contreras PA-C [facility] None

## 2019-05-16 ENCOUNTER — OFFICE VISIT (OUTPATIENT)
Dept: CARDIOLOGY | Facility: CLINIC | Age: 74
End: 2019-05-16

## 2019-05-16 VITALS
BODY MASS INDEX: 21.55 KG/M2 | DIASTOLIC BLOOD PRESSURE: 65 MMHG | SYSTOLIC BLOOD PRESSURE: 123 MMHG | HEIGHT: 63 IN | WEIGHT: 121.6 LBS | RESPIRATION RATE: 16 BRPM | HEART RATE: 77 BPM

## 2019-05-16 DIAGNOSIS — I73.9 PVD (PERIPHERAL VASCULAR DISEASE) (HCC): ICD-10-CM

## 2019-05-16 DIAGNOSIS — R94.31 ABNORMAL EKG: ICD-10-CM

## 2019-05-16 DIAGNOSIS — I25.10 ARTERIOSCLEROTIC CARDIOVASCULAR DISEASE (ASCVD): Primary | ICD-10-CM

## 2019-05-16 DIAGNOSIS — I49.5 SSS (SICK SINUS SYNDROME) (HCC): ICD-10-CM

## 2019-05-16 DIAGNOSIS — I10 ESSENTIAL HYPERTENSION: ICD-10-CM

## 2019-05-16 DIAGNOSIS — Z72.0 TOBACCO ABUSE: ICD-10-CM

## 2019-05-16 PROCEDURE — 99214 OFFICE O/P EST MOD 30 MIN: CPT | Performed by: PHYSICIAN ASSISTANT

## 2019-05-16 PROCEDURE — 93000 ELECTROCARDIOGRAM COMPLETE: CPT | Performed by: PHYSICIAN ASSISTANT

## 2019-05-16 NOTE — PROGRESS NOTES
Yasmine Reynolds MD  Sirisha Valdez  1945  05/16/2019    Patient Active Problem List   Diagnosis   • Arteriosclerotic cardiovascular disease (ASCVD), s/p stenting in 1999, after a myocardial infarction.    • SSS (sick sinus syndrome), s/p permanent pacemaker implantion,    • Palpitations   • Dizziness   • Dyslipidemia, currently on statin   • Essential hypertension, well-contolled.    • PVD (peripheral vascular disease) (CMS/HCC)   • Swelling of both lower extremities   • Chronic left shoulder pain   • S/P arthroscopy of shoulder   • Coagulopathy (CMS/HCC)   • Bilateral malignant neoplasm of breast in female, estrogen receptor positive (CMS/HCC)   • Tobacco abuse   • Aromatase inhibitor use   • Osteopenia   • Abnormal EKG       Dear Yasmine Reynolds MD:    Subjective     History of Present Illness:    Chief Complaint   Patient presents with   • ASCVD   • PVD       Sirisha Valdez is a pleasant 73 y.o. female with a past medical history significant for peripheral arterial disease status post tomorrow for more bypass as well as bilateral lower extremity stent placement, ASCVD status post stenting in 1993, sick sinus syndrome status post permanent pacemaker implantation, essential hypertension, and dyslipidemia.  Patient comes in for routine cardiology follow-up.     Patient reports that she has been doing well.  She denies any chest pains, shortness of breath, palpitations, dizziness, or syncope.  She does still admit to some claudication in her lower extremities were reports that is improved since I last increased her Pletal.  She reports her blood pressures been well controlled to the best of her knowledge but she has not been checking this daily.    Allergies   Allergen Reactions   • Promethazine      hypotension   :      Current Outpatient Medications:   •  anastrozole (ARIMIDEX) 1 MG tablet, TAKE 1 TABLET DAILY, Disp: 30 tablet, Rfl: 5  •  aspirin 81 MG EC tablet, Take 81 mg by mouth daily.,  Disp: , Rfl:   •  busPIRone (BUSPAR) 10 MG tablet, Take 10 mg by mouth 2 (Two) Times a Day., Disp: , Rfl: 5  •  CALCIUM CARBONATE PO, Take  by mouth., Disp: , Rfl:   •  cholecalciferol (VITAMIN D3) 1000 units tablet, Take 1,000 Units by mouth Daily., Disp: , Rfl:   •  cilostazol (PLETAL) 100 MG tablet, Take 1 tablet by mouth 2 (Two) Times a Day., Disp: 60 tablet, Rfl: 6  •  diazepam (VALIUM) 10 MG tablet, Take 10 mg by mouth 2 (two) times a day., Disp: , Rfl:   •  HYDROcodone-acetaminophen (NORCO) 7.5-325 MG per tablet, Take 1 tablet by mouth Every 6 (Six) Hours As Needed for Moderate Pain ., Disp: , Rfl:   •  metoprolol tartrate (LOPRESSOR) 50 MG tablet, Take 50 mg by mouth 2 (two) times a day., Disp: , Rfl:   •  nitroglycerin (NITROSTAT) 0.4 MG SL tablet, Place 0.4 mg under the tongue Every 5 (Five) Minutes As Needed for Chest Pain. Take no more than 3 doses in 15 minutes., Disp: , Rfl:   •  rosuvastatin (CRESTOR) 40 MG tablet, Take 40 mg by mouth Every Night., Disp: , Rfl: 5    The following portions of the patient's history were reviewed and updated as appropriate: allergies, current medications, past family history, past medical history, past social history, past surgical history and problem list.    Social History     Tobacco Use   • Smoking status: Current Every Day Smoker     Packs/day: 1.00     Years: 54.00     Pack years: 54.00     Types: Cigarettes     Start date: 1961   • Smokeless tobacco: Never Used   Substance Use Topics   • Alcohol use: No   • Drug use: No       Review of Systems   Constitution: Negative for weakness and malaise/fatigue.   Cardiovascular: Negative for chest pain, dyspnea on exertion, irregular heartbeat, leg swelling, palpitations and syncope.   Respiratory: Positive for shortness of breath. Negative for cough.    Hematologic/Lymphatic: Negative for bleeding problem. Does not bruise/bleed easily.   Gastrointestinal: Negative for nausea and vomiting.   Neurological: Negative for  "dizziness.       Objective   Vitals:    05/16/19 1219   BP: 123/65   BP Location: Right arm   Pulse: 77   Resp: 16   Weight: 55.2 kg (121 lb 9.6 oz)   Height: 160 cm (62.99\")     Body mass index is 21.55 kg/m².    Physical Exam   Constitutional: She is oriented to person, place, and time. She appears well-developed and well-nourished. No distress.   HENT:   Head: Normocephalic and atraumatic.   Cardiovascular: Normal rate, regular rhythm, normal heart sounds and intact distal pulses.   Pulses:       Dorsalis pedis pulses are 1+ on the right side, and 1+ on the left side.   Capillary refill is roughly 2 seconds on both Hallux    Pulmonary/Chest: Effort normal and breath sounds normal. No respiratory distress.   Musculoskeletal: She exhibits no edema.   Neurological: She is alert and oriented to person, place, and time.   Skin: She is not diaphoretic.   Cuts/scrapes both lower extremities appear slow to heal       Lab Results   Component Value Date     06/19/2018    K 3.8 06/19/2018     (H) 06/19/2018    CO2 25.7 06/19/2018    BUN 9 06/19/2018    CREATININE 1.14 06/19/2018    GLUCOSE 135 (H) 06/19/2018    CALCIUM 9.4 06/19/2018    AST 25 06/19/2018    ALT 11 06/19/2018    ALKPHOS 70 06/19/2018    LABIL2 1.4 (L) 01/04/2016     Lab Results   Component Value Date    CKTOTAL 60 01/02/2016     Lab Results   Component Value Date    WBC 10.05 06/19/2018    HGB 13.3 06/19/2018    HCT 39.4 06/19/2018     06/19/2018     Lab Results   Component Value Date    INR 0.99 06/19/2018    INR <0.90 01/02/2016     Lab Results   Component Value Date    MG 2.2 01/02/2016     Lab Results   Component Value Date    TSH 2.355 01/02/2016    CHLPL 146 01/02/2016    TRIG 105 10/13/2016    HDL 52 (L) 10/13/2016    LDL 57 10/13/2016      Lab Results   Component Value Date    .0 (H) 06/19/2018       During this visit the following were done:  Labs Reviewed [x]    Labs Ordered []    Radiology Reports Reviewed [x]  "   Radiology Ordered []    PCP Records Reviewed []    Referring Provider Records Reviewed []    ER Records Reviewed []    Hospital Records Reviewed []    History Obtained From Family []    Radiology Images Reviewed []    Other Reviewed []    Records Requested []         ECG 12 Lead  Date/Time: 5/16/2019 12:22 PM  Performed by: Jose G Contreras PA-C  Authorized by: Jose G Contreras PA-C   Comparison: compared with previous ECG from 6/9/2018  Comparison to previous ECG: New development of ST depression in leads II,III, aVF, V3 through V6  Rhythm: sinus rhythm  ST Depression: V3, V4, V5, V6, III, aVF and II  T inversion: V3, V4, V5, III and II  Pacing: atrial sensed rhythm and 100% capture  Clinical impression: abnormal EKG  Comments: Dr. Thrasher also reviewed EKG            Assessment/Plan    Diagnosis Plan   1. Arteriosclerotic cardiovascular disease (ASCVD), s/p stenting in 1999, after a myocardial infarction.      2. SSS (sick sinus syndrome), s/p permanent pacemaker implantion,      3. Essential hypertension, well-contolled.      4. PVD (peripheral vascular disease) (CMS/HCC)     5. Tobacco abuse     6. Abnormal EKG  Stress Test With Myocardial Perfusion            Recommendations:  1. Since patient's EKG showed new development of ST depression in anterior lateral and inferior leads and patient is completely asymptomatic I will order stress test to evaluate for possible underlying ischemia.  I advised her that if she were to develop any symptoms of chest pain whatsoever to immediately go to the emergency department.  2. Continue Crestor, Toprol tartrate, Pletal, and aspirin.    Patient's Body mass index is 21.55 kg/m². BMI is within normal parameters. No follow-up required..     Return in about 2 weeks (around 5/30/2019).    As always, I appreciate very much the opportunity to participate in the cardiovascular care of your patients.      With Best Regards,    Jose G Contreras PA-C

## 2019-05-30 ENCOUNTER — HOSPITAL ENCOUNTER (OUTPATIENT)
Dept: NUCLEAR MEDICINE | Facility: HOSPITAL | Age: 74
Discharge: HOME | End: 2019-05-30

## 2019-05-30 ENCOUNTER — HOSPITAL ENCOUNTER (OUTPATIENT)
Dept: CARDIOLOGY | Facility: HOSPITAL | Age: 74
Discharge: HOME | End: 2019-05-30

## 2019-05-30 DIAGNOSIS — R94.31 ABNORMAL EKG: ICD-10-CM

## 2019-05-30 LAB
BH CV NUCLEAR PRIOR STUDY: 3
BH CV STRESS BP STAGE 1: NORMAL
BH CV STRESS BP STAGE 2: NORMAL
BH CV STRESS COMMENTS STAGE 1: NORMAL
BH CV STRESS COMMENTS STAGE 2: NORMAL
BH CV STRESS DOSE REGADENOSON STAGE 1: 0.4
BH CV STRESS DURATION MIN STAGE 1: 0
BH CV STRESS DURATION MIN STAGE 2: 4
BH CV STRESS DURATION SEC STAGE 1: 10
BH CV STRESS DURATION SEC STAGE 2: 0
BH CV STRESS HR STAGE 1: 90
BH CV STRESS HR STAGE 2: 86
BH CV STRESS PROTOCOL 1: NORMAL
BH CV STRESS RECOVERY BP: NORMAL MMHG
BH CV STRESS RECOVERY HR: 86 BPM
BH CV STRESS STAGE 1: 1
BH CV STRESS STAGE 2: 2
MAXIMAL PREDICTED HEART RATE: 149 BPM
PERCENT MAX PREDICTED HR: 60.4 %
STRESS BASELINE BP: NORMAL MMHG
STRESS BASELINE HR: 70 BPM
STRESS PERCENT HR: 71 %
STRESS POST PEAK BP: NORMAL MMHG
STRESS POST PEAK HR: 90 BPM
STRESS TARGET HR: 127 BPM

## 2019-05-30 PROCEDURE — 0 TECHNETIUM SESTAMIBI: Performed by: PHYSICIAN ASSISTANT

## 2019-05-30 PROCEDURE — 93017 CV STRESS TEST TRACING ONLY: CPT

## 2019-05-30 PROCEDURE — 93018 CV STRESS TEST I&R ONLY: CPT | Performed by: INTERNAL MEDICINE

## 2019-05-30 PROCEDURE — A9500 TC99M SESTAMIBI: HCPCS | Performed by: PHYSICIAN ASSISTANT

## 2019-05-30 PROCEDURE — 78452 HT MUSCLE IMAGE SPECT MULT: CPT | Performed by: INTERNAL MEDICINE

## 2019-05-30 PROCEDURE — 25010000002 REGADENOSON 0.4 MG/5ML SOLUTION: Performed by: PHYSICIAN ASSISTANT

## 2019-05-30 PROCEDURE — 78452 HT MUSCLE IMAGE SPECT MULT: CPT

## 2019-05-30 RX ADMIN — TECHNETIUM TC 99M SESTAMIBI 1 DOSE: 1 INJECTION INTRAVENOUS at 07:40

## 2019-05-30 RX ADMIN — REGADENOSON 0.4 MG: 0.08 INJECTION, SOLUTION INTRAVENOUS at 09:30

## 2019-05-30 RX ADMIN — TECHNETIUM TC 99M SESTAMIBI 1 DOSE: 1 INJECTION INTRAVENOUS at 09:30

## 2019-06-07 ENCOUNTER — OFFICE VISIT (OUTPATIENT)
Dept: CARDIOLOGY | Facility: CLINIC | Age: 74
End: 2019-06-07

## 2019-06-07 ENCOUNTER — PREP FOR SURGERY (OUTPATIENT)
Dept: OTHER | Facility: HOSPITAL | Age: 74
End: 2019-06-07

## 2019-06-07 VITALS
DIASTOLIC BLOOD PRESSURE: 51 MMHG | BODY MASS INDEX: 21.55 KG/M2 | WEIGHT: 121.6 LBS | HEART RATE: 67 BPM | OXYGEN SATURATION: 99 % | SYSTOLIC BLOOD PRESSURE: 93 MMHG | HEIGHT: 63 IN

## 2019-06-07 DIAGNOSIS — R94.39 ABNORMAL NUCLEAR STRESS TEST: Primary | ICD-10-CM

## 2019-06-07 DIAGNOSIS — I10 ESSENTIAL HYPERTENSION: ICD-10-CM

## 2019-06-07 DIAGNOSIS — Z72.0 TOBACCO ABUSE: ICD-10-CM

## 2019-06-07 DIAGNOSIS — I25.10 ARTERIOSCLEROTIC CARDIOVASCULAR DISEASE (ASCVD): Primary | ICD-10-CM

## 2019-06-07 DIAGNOSIS — E78.5 DYSLIPIDEMIA: ICD-10-CM

## 2019-06-07 DIAGNOSIS — I49.5 SSS (SICK SINUS SYNDROME) (HCC): ICD-10-CM

## 2019-06-07 PROCEDURE — 99214 OFFICE O/P EST MOD 30 MIN: CPT | Performed by: PHYSICIAN ASSISTANT

## 2019-06-07 RX ORDER — BUDESONIDE 3 MG/1
9 CAPSULE, COATED PELLETS ORAL EVERY MORNING
COMMUNITY
End: 2020-12-07 | Stop reason: SDUPTHER

## 2019-06-07 NOTE — H&P (VIEW-ONLY)
"Yasmine Reynolds MD  Sirisha Valdez  1945  06/07/2019    Patient Active Problem List   Diagnosis   • Arteriosclerotic cardiovascular disease (ASCVD), s/p stenting in 1999, after a myocardial infarction.  And repeat stenting in 2005 of the LAD and RCA at [facility]    • SSS (sick sinus syndrome), s/p permanent pacemaker implantion,    • Palpitations   • Dizziness   • Dyslipidemia, currently on statin   • Essential hypertension, well-contolled.    • PVD (peripheral vascular disease) (CMS/HCC)   • Swelling of both lower extremities   • Chronic left shoulder pain   • S/P arthroscopy of shoulder   • Coagulopathy (CMS/HCC)   • Bilateral malignant neoplasm of breast in female, estrogen receptor positive (CMS/HCC)   • Tobacco abuse   • Aromatase inhibitor use   • Osteopenia   • Abnormal EKG       Dear Yasmine Reynolds MD:    Subjective     History of Present Illness:    Chief Complaint   Patient presents with   • Follow-up   • Med Management     med list.    • Results     stress   • Edema       Sirisha Valdez is a pleasant 73 y.o. female with a past medical history significant for ASCVD with myocardial infarction in 1999 and stenting at that time as well as stenting in LAD and RCA in 2005, tobacco abuse, sick sinus syndrome status post permanent pacemaker implantation, essential hypertension, peripheral arterial disease status post bilateral iliac stenting, and dyslipidemia.    Patient reports that she has developed back pain roughly 2 weeks ago that she describes as a \"ache\" she reports that this pain is across both sides of her shoulder and goes up into her upper neck.  She does report that it gets worse with exertion and denies any history of back related issues.  She does report that she gets short of breath and diaphoretic with this pain as well.  Reports that this pain does last a couple hours.  Of note when she received stents in the past she had the same kind of back pain.    Stress test " on 5/30/2019  Interpretation Summary   · Myocardial perfusion imaging indicates a small-to-medium-sized, moderate degree of ischemia located in the inferior wall.  · Normal LV cavity size. Normal LV wall motion noted.  · Left ventricular ejection fraction is hyperdynamic (Calculated EF > 70%).  · Impressions are consistent with an intermediate risk study.       Left heart cath           Allergies   Allergen Reactions   • Promethazine      hypotension   :      Current Outpatient Medications:   •  anastrozole (ARIMIDEX) 1 MG tablet, TAKE 1 TABLET DAILY, Disp: 30 tablet, Rfl: 5  •  Ascorbic Acid (VITAMIN C PO), Take  by mouth., Disp: , Rfl:   •  aspirin 81 MG EC tablet, Take 81 mg by mouth daily., Disp: , Rfl:   •  Budesonide (ENTOCORT EC) 3 MG 24 hr capsule, Take 6 mg by mouth Every Morning., Disp: , Rfl:   •  busPIRone (BUSPAR) 10 MG tablet, Take 10 mg by mouth 2 (Two) Times a Day., Disp: , Rfl: 5  •  CALCIUM CARBONATE PO, Take  by mouth., Disp: , Rfl:   •  cholecalciferol (VITAMIN D3) 1000 units tablet, Take 1,000 Units by mouth Daily., Disp: , Rfl:   •  cilostazol (PLETAL) 100 MG tablet, Take 1 tablet by mouth 2 (Two) Times a Day., Disp: 60 tablet, Rfl: 6  •  diazepam (VALIUM) 10 MG tablet, Take 10 mg by mouth 2 (two) times a day., Disp: , Rfl:   •  HYDROcodone-acetaminophen (NORCO) 7.5-325 MG per tablet, Take 1 tablet by mouth Every 6 (Six) Hours As Needed for Moderate Pain ., Disp: , Rfl:   •  metoprolol tartrate (LOPRESSOR) 50 MG tablet, Take 50 mg by mouth 2 (two) times a day., Disp: , Rfl:   •  nitroglycerin (NITROSTAT) 0.4 MG SL tablet, Place 0.4 mg under the tongue Every 5 (Five) Minutes As Needed for Chest Pain. Take no more than 3 doses in 15 minutes., Disp: , Rfl:   •  rosuvastatin (CRESTOR) 40 MG tablet, Take 40 mg by mouth Every Night., Disp: , Rfl: 5    The following portions of the patient's history were reviewed and updated as appropriate: allergies, current medications, past family history, past  "medical history, past social history, past surgical history and problem list.    Social History     Tobacco Use   • Smoking status: Current Every Day Smoker     Packs/day: 1.00     Years: 54.00     Pack years: 54.00     Types: Cigarettes     Start date: 1961   • Smokeless tobacco: Never Used   Substance Use Topics   • Alcohol use: No   • Drug use: No       Review of Systems   Constitution: Negative for weakness and malaise/fatigue.   Cardiovascular: Negative for chest pain, dyspnea on exertion and irregular heartbeat.   Respiratory: Negative for cough and shortness of breath.    Hematologic/Lymphatic: Negative for bleeding problem. Does not bruise/bleed easily.   Gastrointestinal: Negative for nausea and vomiting.       Objective   Vitals:    06/07/19 1003   BP: 93/51   BP Location: Left arm   Patient Position: Sitting   Cuff Size: Adult   Pulse: 67   SpO2: 99%   Weight: 55.2 kg (121 lb 9.6 oz)   Height: 160 cm (63\")     Body mass index is 21.54 kg/m².    Physical Exam   Constitutional: She is oriented to person, place, and time. She appears well-developed and well-nourished. No distress.   HENT:   Head: Normocephalic and atraumatic.   Cardiovascular: Normal rate, regular rhythm, normal heart sounds and intact distal pulses.   Pulmonary/Chest: Effort normal. No respiratory distress. She has wheezes.   Musculoskeletal: She exhibits no edema.   Neurological: She is alert and oriented to person, place, and time.   Skin: She is not diaphoretic.       Lab Results   Component Value Date     06/19/2018    K 3.8 06/19/2018     (H) 06/19/2018    CO2 25.7 06/19/2018    BUN 9 06/19/2018    CREATININE 1.14 06/19/2018    GLUCOSE 135 (H) 06/19/2018    CALCIUM 9.4 06/19/2018    AST 25 06/19/2018    ALT 11 06/19/2018    ALKPHOS 70 06/19/2018    LABIL2 1.4 (L) 01/04/2016     Lab Results   Component Value Date    CKTOTAL 60 01/02/2016     Lab Results   Component Value Date    WBC 10.05 06/19/2018    HGB 13.3 06/19/2018    " HCT 39.4 06/19/2018     06/19/2018     Lab Results   Component Value Date    INR 0.99 06/19/2018    INR <0.90 01/02/2016     Lab Results   Component Value Date    MG 2.2 01/02/2016     Lab Results   Component Value Date    TSH 2.355 01/02/2016    CHLPL 146 01/02/2016    TRIG 105 10/13/2016    HDL 52 (L) 10/13/2016    LDL 57 10/13/2016      Lab Results   Component Value Date    .0 (H) 06/19/2018       During this visit the following were done:  Labs Reviewed [x]    Labs Ordered []    Radiology Reports Reviewed [x]    Radiology Ordered []    PCP Records Reviewed []    Referring Provider Records Reviewed []    ER Records Reviewed []    Hospital Records Reviewed []    History Obtained From Family []    Radiology Images Reviewed []    Other Reviewed []    Records Requested []       Procedures    Assessment/Plan    Diagnosis Plan   1. Arteriosclerotic cardiovascular disease (ASCVD), s/p stenting in 1999, after a myocardial infarction.      2. SSS (sick sinus syndrome), s/p permanent pacemaker implantion,      3. Essential hypertension, well-contolled.      4. Dyslipidemia, currently on statin     5. Tobacco abuse              Recommendations:  1. I did discuss the case with Dr. Mccarthy given her abnormal EKG that did show some ischemia in inferior region with a subsequent stress test that showed small to medium sized, moderate degree of ischemia in the inferior wall he does feel the best course of action is to proceed with left heart catheterization given her history of multiple areas of stenting and PAD.  Even though her symptoms are atypical.  I explained the risks of this procedure such as damage to the arterial system, kidney injury, heart attack, stroke, and even death.  She expressed understanding and wished to proceed with the procedure.  We will set her up for this on June 12, next Wednesday.  2. Continue Crestor, metoprolol tartrate, Pletal, aspirin.  Patient not on ACE inhibitor or ARB due to low  blood pressure.    Patient's Body mass index is 21.54 kg/m². BMI is within normal parameters. No follow-up required..     Return in about 4 weeks (around 7/5/2019).    As always, I appreciate very much the opportunity to participate in the cardiovascular care of your patients.      With Best Regards,    Jose G Contreras PA-C

## 2019-06-07 NOTE — PROGRESS NOTES
"Yasmine Reynolds MD  Sirisha Valdez  1945  06/07/2019    Patient Active Problem List   Diagnosis   • Arteriosclerotic cardiovascular disease (ASCVD), s/p stenting in 1999, after a myocardial infarction.  And [facility]    • SSS (sick sinus syndrome), s/p permanent pacemaker implantion,    • Palpitations   • Dizziness   • Dyslipidemia, currently on statin   • Essential hypertension, well-contolled.    • PVD (peripheral vascular disease) (CMS/HCC)   • Swelling of both lower extremities   • Chronic left shoulder pain   • S/P arthroscopy of shoulder   • Coagulopathy (CMS/HCC)   • Bilateral malignant neoplasm of breast in female, estrogen receptor positive (CMS/HCC)   • Tobacco abuse   • Aromatase inhibitor use   • Osteopenia   • Abnormal EKG       Dear Yasmine Reynolds MD:    Subjective     History of Present Illness:    Chief Complaint   Patient presents with   • Follow-up   • Med Management     med list.    • Results     stress   • Edema       Sirisha Valdez is a pleasant 73 y.o. female with a past medical history significant for ASCVD with myocardial infarction in 1999 and stenting at that time as well as stenting in LAD and RCA in 2005, tobacco abuse, sick sinus syndrome status post permanent pacemaker implantation, essential hypertension, peripheral arterial disease status post bilateral iliac stenting, and dyslipidemia.    Patient reports that she has developed back pain roughly 2 weeks ago that she describes as a \"ache\" she reports that this pain is across both sides of her shoulder and goes up into her upper neck.  She does report that it gets worse with exertion and denies any history of back related issues.  She does report that she gets short of breath and diaphoretic with this pain as well.  Reports that this pain does last a couple hours.  Of note when she received stents in the past she had the same kind of back pain.    Stress test " on 5/30/2019  Interpretation Summary   · Myocardial perfusion imaging indicates a small-to-medium-sized, moderate degree of ischemia located in the inferior wall.  · Normal LV cavity size. Normal LV wall motion noted.  · Left ventricular ejection fraction is hyperdynamic (Calculated EF > 70%).  · Impressions are consistent with an intermediate risk study.       Left heart cath           Allergies   Allergen Reactions   • Promethazine      hypotension   :      Current Outpatient Medications:   •  anastrozole (ARIMIDEX) 1 MG tablet, TAKE 1 TABLET DAILY, Disp: 30 tablet, Rfl: 5  •  Ascorbic Acid (VITAMIN C PO), Take  by mouth., Disp: , Rfl:   •  aspirin 81 MG EC tablet, Take 81 mg by mouth daily., Disp: , Rfl:   •  Budesonide (ENTOCORT EC) 3 MG 24 hr capsule, Take 6 mg by mouth Every Morning., Disp: , Rfl:   •  busPIRone (BUSPAR) 10 MG tablet, Take 10 mg by mouth 2 (Two) Times a Day., Disp: , Rfl: 5  •  CALCIUM CARBONATE PO, Take  by mouth., Disp: , Rfl:   •  cholecalciferol (VITAMIN D3) 1000 units tablet, Take 1,000 Units by mouth Daily., Disp: , Rfl:   •  cilostazol (PLETAL) 100 MG tablet, Take 1 tablet by mouth 2 (Two) Times a Day., Disp: 60 tablet, Rfl: 6  •  diazepam (VALIUM) 10 MG tablet, Take 10 mg by mouth 2 (two) times a day., Disp: , Rfl:   •  HYDROcodone-acetaminophen (NORCO) 7.5-325 MG per tablet, Take 1 tablet by mouth Every 6 (Six) Hours As Needed for Moderate Pain ., Disp: , Rfl:   •  metoprolol tartrate (LOPRESSOR) 50 MG tablet, Take 50 mg by mouth 2 (two) times a day., Disp: , Rfl:   •  nitroglycerin (NITROSTAT) 0.4 MG SL tablet, Place 0.4 mg under the tongue Every 5 (Five) Minutes As Needed for Chest Pain. Take no more than 3 doses in 15 minutes., Disp: , Rfl:   •  rosuvastatin (CRESTOR) 40 MG tablet, Take 40 mg by mouth Every Night., Disp: , Rfl: 5    The following portions of the patient's history were reviewed and updated as appropriate: allergies, current medications, past family history, past  "medical history, past social history, past surgical history and problem list.    Social History     Tobacco Use   • Smoking status: Current Every Day Smoker     Packs/day: 1.00     Years: 54.00     Pack years: 54.00     Types: Cigarettes     Start date: 1961   • Smokeless tobacco: Never Used   Substance Use Topics   • Alcohol use: No   • Drug use: No       Review of Systems   Constitution: Negative for weakness and malaise/fatigue.   Cardiovascular: Negative for chest pain, dyspnea on exertion and irregular heartbeat.   Respiratory: Negative for cough and shortness of breath.    Hematologic/Lymphatic: Negative for bleeding problem. Does not bruise/bleed easily.   Gastrointestinal: Negative for nausea and vomiting.       Objective   Vitals:    06/07/19 1003   BP: 93/51   BP Location: Left arm   Patient Position: Sitting   Cuff Size: Adult   Pulse: 67   SpO2: 99%   Weight: 55.2 kg (121 lb 9.6 oz)   Height: 160 cm (63\")     Body mass index is 21.54 kg/m².    Physical Exam   Constitutional: She is oriented to person, place, and time. She appears well-developed and well-nourished. No distress.   HENT:   Head: Normocephalic and atraumatic.   Cardiovascular: Normal rate, regular rhythm, normal heart sounds and intact distal pulses.   Pulmonary/Chest: Effort normal. No respiratory distress. She has wheezes.   Musculoskeletal: She exhibits no edema.   Neurological: She is alert and oriented to person, place, and time.   Skin: She is not diaphoretic.       Lab Results   Component Value Date     06/19/2018    K 3.8 06/19/2018     (H) 06/19/2018    CO2 25.7 06/19/2018    BUN 9 06/19/2018    CREATININE 1.14 06/19/2018    GLUCOSE 135 (H) 06/19/2018    CALCIUM 9.4 06/19/2018    AST 25 06/19/2018    ALT 11 06/19/2018    ALKPHOS 70 06/19/2018    LABIL2 1.4 (L) 01/04/2016     Lab Results   Component Value Date    CKTOTAL 60 01/02/2016     Lab Results   Component Value Date    WBC 10.05 06/19/2018    HGB 13.3 06/19/2018    " HCT 39.4 06/19/2018     06/19/2018     Lab Results   Component Value Date    INR 0.99 06/19/2018    INR <0.90 01/02/2016     Lab Results   Component Value Date    MG 2.2 01/02/2016     Lab Results   Component Value Date    TSH 2.355 01/02/2016    CHLPL 146 01/02/2016    TRIG 105 10/13/2016    HDL 52 (L) 10/13/2016    LDL 57 10/13/2016      Lab Results   Component Value Date    .0 (H) 06/19/2018       During this visit the following were done:  Labs Reviewed [x]    Labs Ordered []    Radiology Reports Reviewed [x]    Radiology Ordered []    PCP Records Reviewed []    Referring Provider Records Reviewed []    ER Records Reviewed []    Hospital Records Reviewed []    History Obtained From Family []    Radiology Images Reviewed []    Other Reviewed []    Records Requested []       Procedures    Assessment/Plan    Diagnosis Plan   1. Arteriosclerotic cardiovascular disease (ASCVD), s/p stenting in 1999, after a myocardial infarction.      2. SSS (sick sinus syndrome), s/p permanent pacemaker implantion,      3. Essential hypertension, well-contolled.      4. Dyslipidemia, currently on statin     5. Tobacco abuse              Recommendations:  1. I did discuss the case with Dr. Mccarthy given her abnormal EKG that did show some ischemia in inferior region with a subsequent stress test that showed small to medium sized, moderate degree of ischemia in the inferior wall he does feel the best course of action is to proceed with left heart catheterization given her history of multiple areas of stenting and PAD.  Even though her symptoms are atypical.  I explained the risks of this procedure such as damage to the arterial system, kidney injury, heart attack, stroke, and even death.  She expressed understanding and wished to proceed with the procedure.  We will set her up for this on June 12, next Wednesday.  2. Continue Crestor, metoprolol tartrate, Pletal, aspirin.  Patient not on ACE inhibitor or ARB due to low  blood pressure.    Patient's Body mass index is 21.54 kg/m². BMI is within normal parameters. No follow-up required..     Return in about 4 weeks (around 7/5/2019).    As always, I appreciate very much the opportunity to participate in the cardiovascular care of your patients.      With Best Regards,    Jose G Contreras PA-C           abdomen

## 2019-06-10 PROBLEM — R94.39 ABNORMAL NUCLEAR STRESS TEST: Status: ACTIVE | Noted: 2019-06-10

## 2019-06-11 ENCOUNTER — LAB (OUTPATIENT)
Dept: LAB | Facility: HOSPITAL | Age: 74
End: 2019-06-11

## 2019-06-11 DIAGNOSIS — Z01.810 PRE-OPERATIVE CARDIOVASCULAR EXAMINATION: Primary | ICD-10-CM

## 2019-06-11 DIAGNOSIS — D72.820 LYMPHOCYTOSIS: Primary | ICD-10-CM

## 2019-06-11 DIAGNOSIS — Z01.810 PRE-OPERATIVE CARDIOVASCULAR EXAMINATION: ICD-10-CM

## 2019-06-11 LAB
ALBUMIN SERPL-MCNC: 3.38 G/DL (ref 3.5–5.2)
ALBUMIN/GLOB SERPL: 1.1 G/DL
ALP SERPL-CCNC: 58 U/L (ref 39–117)
ALT SERPL W P-5'-P-CCNC: 8 U/L (ref 1–33)
ANION GAP SERPL CALCULATED.3IONS-SCNC: 12 MMOL/L
AST SERPL-CCNC: 12 U/L (ref 1–32)
BASOPHILS # BLD AUTO: 0.05 10*3/MM3 (ref 0–0.2)
BASOPHILS NFR BLD AUTO: 0.4 % (ref 0–1.5)
BILIRUB SERPL-MCNC: 0.3 MG/DL (ref 0.2–1.2)
BUN BLD-MCNC: 11 MG/DL (ref 8–23)
BUN/CREAT SERPL: 9.2 (ref 7–25)
CALCIUM SPEC-SCNC: 9.1 MG/DL (ref 8.6–10.5)
CHLORIDE SERPL-SCNC: 109 MMOL/L (ref 98–107)
CO2 SERPL-SCNC: 25 MMOL/L (ref 22–29)
CREAT BLD-MCNC: 1.19 MG/DL (ref 0.57–1)
DEPRECATED RDW RBC AUTO: 51.6 FL (ref 37–54)
EOSINOPHIL # BLD AUTO: 0.23 10*3/MM3 (ref 0–0.4)
EOSINOPHIL NFR BLD AUTO: 1.7 % (ref 0.3–6.2)
ERYTHROCYTE [DISTWIDTH] IN BLOOD BY AUTOMATED COUNT: 14.7 % (ref 12.3–15.4)
GFR SERPL CREATININE-BSD FRML MDRD: 45 ML/MIN/1.73
GLOBULIN UR ELPH-MCNC: 3 GM/DL
GLUCOSE BLD-MCNC: 92 MG/DL (ref 65–99)
HCT VFR BLD AUTO: 42.4 % (ref 34–46.6)
HGB BLD-MCNC: 13.8 G/DL (ref 12–15.9)
IMM GRANULOCYTES # BLD AUTO: 0.04 10*3/MM3 (ref 0–0.05)
IMM GRANULOCYTES NFR BLD AUTO: 0.3 % (ref 0–0.5)
LYMPHOCYTES # BLD AUTO: 3.53 10*3/MM3 (ref 0.7–3.1)
LYMPHOCYTES NFR BLD AUTO: 25.7 % (ref 19.6–45.3)
MCH RBC QN AUTO: 32.3 PG (ref 26.6–33)
MCHC RBC AUTO-ENTMCNC: 32.5 G/DL (ref 31.5–35.7)
MCV RBC AUTO: 99.3 FL (ref 79–97)
MONOCYTES # BLD AUTO: 0.77 10*3/MM3 (ref 0.1–0.9)
MONOCYTES NFR BLD AUTO: 5.6 % (ref 5–12)
NEUTROPHILS # BLD AUTO: 9.13 10*3/MM3 (ref 1.7–7)
NEUTROPHILS NFR BLD AUTO: 66.3 % (ref 42.7–76)
PLATELET # BLD AUTO: 302 10*3/MM3 (ref 140–450)
PMV BLD AUTO: 9.2 FL (ref 6–12)
POTASSIUM BLD-SCNC: 3.5 MMOL/L (ref 3.5–5.2)
PROT SERPL-MCNC: 6.4 G/DL (ref 6–8.5)
RBC # BLD AUTO: 4.27 10*6/MM3 (ref 3.77–5.28)
SODIUM BLD-SCNC: 146 MMOL/L (ref 136–145)
WBC NRBC COR # BLD: 13.75 10*3/MM3 (ref 3.4–10.8)

## 2019-06-11 PROCEDURE — 85025 COMPLETE CBC W/AUTO DIFF WBC: CPT

## 2019-06-11 PROCEDURE — 80053 COMPREHEN METABOLIC PANEL: CPT

## 2019-06-11 PROCEDURE — 36415 COLL VENOUS BLD VENIPUNCTURE: CPT

## 2019-06-12 ENCOUNTER — APPOINTMENT (OUTPATIENT)
Dept: LAB | Facility: HOSPITAL | Age: 74
End: 2019-06-12

## 2019-06-12 ENCOUNTER — HOSPITAL ENCOUNTER (OUTPATIENT)
Facility: HOSPITAL | Age: 74
Setting detail: HOSPITAL OUTPATIENT SURGERY
Discharge: HOME | End: 2019-06-12
Attending: INTERNAL MEDICINE | Admitting: INTERNAL MEDICINE

## 2019-06-12 ENCOUNTER — HOSPITAL ENCOUNTER (OUTPATIENT)
Dept: GENERAL RADIOLOGY | Facility: HOSPITAL | Age: 74
Discharge: HOME | End: 2019-06-12
Admitting: PHYSICIAN ASSISTANT

## 2019-06-12 VITALS
OXYGEN SATURATION: 95 % | BODY MASS INDEX: 21.51 KG/M2 | HEIGHT: 63 IN | TEMPERATURE: 98.4 F | RESPIRATION RATE: 16 BRPM | HEART RATE: 72 BPM | WEIGHT: 121.38 LBS | SYSTOLIC BLOOD PRESSURE: 143 MMHG | DIASTOLIC BLOOD PRESSURE: 71 MMHG

## 2019-06-12 DIAGNOSIS — R94.39 ABNORMAL NUCLEAR STRESS TEST: ICD-10-CM

## 2019-06-12 DIAGNOSIS — D72.820 LYMPHOCYTOSIS: ICD-10-CM

## 2019-06-12 LAB
BACTERIA UR QL AUTO: NORMAL /HPF
BILIRUB UR QL STRIP: NEGATIVE
CLARITY UR: CLEAR
COLOR UR: YELLOW
GLUCOSE UR STRIP-MCNC: NEGATIVE MG/DL
HGB UR QL STRIP.AUTO: NEGATIVE
HYALINE CASTS UR QL AUTO: NORMAL /LPF
KETONES UR QL STRIP: NEGATIVE
LEUKOCYTE ESTERASE UR QL STRIP.AUTO: NEGATIVE
NITRITE UR QL STRIP: NEGATIVE
PH UR STRIP.AUTO: 6 [PH] (ref 5–8)
PROT UR QL STRIP: ABNORMAL
RBC # UR: NORMAL /HPF
REF LAB TEST METHOD: NORMAL
SP GR UR STRIP: 1.01 (ref 1–1.03)
SQUAMOUS #/AREA URNS HPF: NORMAL /HPF
UROBILINOGEN UR QL STRIP: ABNORMAL
WBC UR QL AUTO: NORMAL /HPF

## 2019-06-12 PROCEDURE — 81001 URINALYSIS AUTO W/SCOPE: CPT | Performed by: PHYSICIAN ASSISTANT

## 2019-06-12 PROCEDURE — 71046 X-RAY EXAM CHEST 2 VIEWS: CPT | Performed by: RADIOLOGY

## 2019-06-12 PROCEDURE — 71046 X-RAY EXAM CHEST 2 VIEWS: CPT

## 2019-06-12 NOTE — INTERVAL H&P NOTE
H&P updated. The patient was examined and the following changes are noted:  since patients creatinine has increased recently and she has leucocytosis will hold of on cardiac cath for now and obtain UA and CXR and have her drink more liquids and reevaluate her CBC and BMP next week.. patient is agreeable.

## 2019-06-12 NOTE — PROGRESS NOTES
I discussed with Ms. Valdez about abnormal labs with elevated white blood cell count and elevated creatinine.  I told her that I would like to check her for any occult infection with a UA and a chest x-ray.  Also told her to consume more liquids such as Gatorade to help improve her kidney function and will reevaluate her blood counts and kidney function next Monday and if these have better and will plan for cardiac catheterization next Tuesday.  Patient expressed understanding and is agreeable with this plan.    Salvador Mccarthy MD [facility]

## 2019-06-12 NOTE — NURSING NOTE
Dr Mccarthy came to talk to patient about abnormal labs. Wants patient to have more labs and xray done and reschedule cath for next week.  Patient discharged at this time

## 2019-06-17 ENCOUNTER — LAB (OUTPATIENT)
Dept: LAB | Facility: HOSPITAL | Age: 74
End: 2019-06-17

## 2019-06-17 ENCOUNTER — TELEPHONE (OUTPATIENT)
Dept: CARDIOLOGY | Facility: CLINIC | Age: 74
End: 2019-06-17

## 2019-06-17 DIAGNOSIS — D72.820 LYMPHOCYTOSIS: ICD-10-CM

## 2019-06-17 LAB
ANION GAP SERPL CALCULATED.3IONS-SCNC: 14 MMOL/L
BUN BLD-MCNC: 8 MG/DL (ref 8–23)
BUN/CREAT SERPL: 7.2 (ref 7–25)
CALCIUM SPEC-SCNC: 9.8 MG/DL (ref 8.6–10.5)
CHLORIDE SERPL-SCNC: 105 MMOL/L (ref 98–107)
CO2 SERPL-SCNC: 28 MMOL/L (ref 22–29)
CREAT BLD-MCNC: 1.11 MG/DL (ref 0.57–1)
GFR SERPL CREATININE-BSD FRML MDRD: 48 ML/MIN/1.73
GLUCOSE BLD-MCNC: 84 MG/DL (ref 65–99)
POTASSIUM BLD-SCNC: 3.9 MMOL/L (ref 3.5–5.2)
SODIUM BLD-SCNC: 147 MMOL/L (ref 136–145)

## 2019-06-17 PROCEDURE — 80048 BASIC METABOLIC PNL TOTAL CA: CPT

## 2019-06-17 PROCEDURE — 36415 COLL VENOUS BLD VENIPUNCTURE: CPT

## 2019-06-17 NOTE — TELEPHONE ENCOUNTER
Spoke with patient her labs were done routine and are not back in time for her stress test. I have spoke with Dr. Mccarthy he would like for the stress test to be placed on Friday. Patient is agreeable. I will place orders for BMP and call cath lab tomorrow.

## 2019-06-18 DIAGNOSIS — D72.9 ABNORMAL WHITE BLOOD CELL COUNT: Primary | ICD-10-CM

## 2019-06-18 DIAGNOSIS — Z01.810 PRE-OPERATIVE CARDIOVASCULAR EXAMINATION: Primary | ICD-10-CM

## 2019-06-18 DIAGNOSIS — D72.9 ABNORMAL WHITE BLOOD CELL (WBC) COUNT: Primary | ICD-10-CM

## 2019-06-19 ENCOUNTER — TELEPHONE (OUTPATIENT)
Dept: CARDIOLOGY | Facility: CLINIC | Age: 74
End: 2019-06-19

## 2019-06-19 ENCOUNTER — LAB (OUTPATIENT)
Dept: LAB | Facility: HOSPITAL | Age: 74
End: 2019-06-19

## 2019-06-19 DIAGNOSIS — D72.9 ABNORMAL WHITE BLOOD CELL (WBC) COUNT: ICD-10-CM

## 2019-06-19 LAB
BASOPHILS # BLD AUTO: 0.03 10*3/MM3 (ref 0–0.2)
BASOPHILS NFR BLD AUTO: 0.2 % (ref 0–1.5)
DEPRECATED RDW RBC AUTO: 54.2 FL (ref 37–54)
EOSINOPHIL # BLD AUTO: 0.25 10*3/MM3 (ref 0–0.4)
EOSINOPHIL NFR BLD AUTO: 1.9 % (ref 0.3–6.2)
ERYTHROCYTE [DISTWIDTH] IN BLOOD BY AUTOMATED COUNT: 14.5 % (ref 12.3–15.4)
HCT VFR BLD AUTO: 42.2 % (ref 34–46.6)
HGB BLD-MCNC: 13.3 G/DL (ref 12–15.9)
IMM GRANULOCYTES # BLD AUTO: 0.03 10*3/MM3 (ref 0–0.05)
IMM GRANULOCYTES NFR BLD AUTO: 0.2 % (ref 0–0.5)
LYMPHOCYTES # BLD AUTO: 2.96 10*3/MM3 (ref 0.7–3.1)
LYMPHOCYTES NFR BLD AUTO: 22.3 % (ref 19.6–45.3)
MCH RBC QN AUTO: 32.3 PG (ref 26.6–33)
MCHC RBC AUTO-ENTMCNC: 31.5 G/DL (ref 31.5–35.7)
MCV RBC AUTO: 102.4 FL (ref 79–97)
MONOCYTES # BLD AUTO: 0.99 10*3/MM3 (ref 0.1–0.9)
MONOCYTES NFR BLD AUTO: 7.5 % (ref 5–12)
NEUTROPHILS # BLD AUTO: 8.99 10*3/MM3 (ref 1.7–7)
NEUTROPHILS NFR BLD AUTO: 67.9 % (ref 42.7–76)
PLATELET # BLD AUTO: 269 10*3/MM3 (ref 140–450)
PMV BLD AUTO: 9.7 FL (ref 6–12)
RBC # BLD AUTO: 4.12 10*6/MM3 (ref 3.77–5.28)
WBC NRBC COR # BLD: 13.25 10*3/MM3 (ref 3.4–10.8)

## 2019-06-19 PROCEDURE — 36415 COLL VENOUS BLD VENIPUNCTURE: CPT

## 2019-06-19 PROCEDURE — 85025 COMPLETE CBC W/AUTO DIFF WBC: CPT

## 2019-06-19 NOTE — TELEPHONE ENCOUNTER
Spoke with patient advised her that we are wanting her to have a CBC asap and that Dr. Mccarthy would be completing a cath on her Tuesday and more labs on her Monday.

## 2019-06-20 ENCOUNTER — PREP FOR SURGERY (OUTPATIENT)
Dept: OTHER | Facility: HOSPITAL | Age: 74
End: 2019-06-20

## 2019-06-20 DIAGNOSIS — R94.39 ABNORMAL NUCLEAR STRESS TEST: Primary | ICD-10-CM

## 2019-06-20 RX ORDER — DIAZEPAM 5 MG/1
5 TABLET ORAL ONCE
Status: CANCELLED | OUTPATIENT
Start: 2019-06-25

## 2019-06-21 DIAGNOSIS — N28.9 ABNORMAL KIDNEY FUNCTION: Primary | ICD-10-CM

## 2019-06-21 DIAGNOSIS — D72.9 ABNORMAL WHITE BLOOD CELL COUNT: ICD-10-CM

## 2019-06-24 ENCOUNTER — LAB (OUTPATIENT)
Dept: LAB | Facility: HOSPITAL | Age: 74
End: 2019-06-24

## 2019-06-24 DIAGNOSIS — N28.9 ABNORMAL KIDNEY FUNCTION: ICD-10-CM

## 2019-06-24 DIAGNOSIS — D72.9 ABNORMAL WHITE BLOOD CELL COUNT: ICD-10-CM

## 2019-06-24 LAB
ALBUMIN SERPL-MCNC: 3.32 G/DL (ref 3.5–5.2)
ALBUMIN/GLOB SERPL: 1 G/DL
ALP SERPL-CCNC: 65 U/L (ref 39–117)
ALT SERPL W P-5'-P-CCNC: 10 U/L (ref 1–33)
ANION GAP SERPL CALCULATED.3IONS-SCNC: 12.7 MMOL/L
AST SERPL-CCNC: 12 U/L (ref 1–32)
BASOPHILS # BLD AUTO: 0.05 10*3/MM3 (ref 0–0.2)
BASOPHILS NFR BLD AUTO: 0.4 % (ref 0–1.5)
BILIRUB SERPL-MCNC: 0.2 MG/DL (ref 0.2–1.2)
BUN BLD-MCNC: 13 MG/DL (ref 8–23)
BUN/CREAT SERPL: 13 (ref 7–25)
CALCIUM SPEC-SCNC: 9.7 MG/DL (ref 8.6–10.5)
CHLORIDE SERPL-SCNC: 105 MMOL/L (ref 98–107)
CO2 SERPL-SCNC: 27.3 MMOL/L (ref 22–29)
CREAT BLD-MCNC: 1 MG/DL (ref 0.57–1)
DEPRECATED RDW RBC AUTO: 51.7 FL (ref 37–54)
EOSINOPHIL # BLD AUTO: 0.24 10*3/MM3 (ref 0–0.4)
EOSINOPHIL NFR BLD AUTO: 1.7 % (ref 0.3–6.2)
ERYTHROCYTE [DISTWIDTH] IN BLOOD BY AUTOMATED COUNT: 14.3 % (ref 12.3–15.4)
GFR SERPL CREATININE-BSD FRML MDRD: 55 ML/MIN/1.73
GLOBULIN UR ELPH-MCNC: 3.3 GM/DL
GLUCOSE BLD-MCNC: 164 MG/DL (ref 65–99)
HCT VFR BLD AUTO: 43.9 % (ref 34–46.6)
HGB BLD-MCNC: 14 G/DL (ref 12–15.9)
IMM GRANULOCYTES # BLD AUTO: 0.03 10*3/MM3 (ref 0–0.05)
IMM GRANULOCYTES NFR BLD AUTO: 0.2 % (ref 0–0.5)
LYMPHOCYTES # BLD AUTO: 2.25 10*3/MM3 (ref 0.7–3.1)
LYMPHOCYTES NFR BLD AUTO: 15.9 % (ref 19.6–45.3)
MCH RBC QN AUTO: 32.3 PG (ref 26.6–33)
MCHC RBC AUTO-ENTMCNC: 31.9 G/DL (ref 31.5–35.7)
MCV RBC AUTO: 101.4 FL (ref 79–97)
MONOCYTES # BLD AUTO: 0.75 10*3/MM3 (ref 0.1–0.9)
MONOCYTES NFR BLD AUTO: 5.3 % (ref 5–12)
NEUTROPHILS # BLD AUTO: 10.8 10*3/MM3 (ref 1.7–7)
NEUTROPHILS NFR BLD AUTO: 76.5 % (ref 42.7–76)
PLATELET # BLD AUTO: 313 10*3/MM3 (ref 140–450)
PMV BLD AUTO: 9.6 FL (ref 6–12)
POTASSIUM BLD-SCNC: 4.2 MMOL/L (ref 3.5–5.2)
PROT SERPL-MCNC: 6.6 G/DL (ref 6–8.5)
RBC # BLD AUTO: 4.33 10*6/MM3 (ref 3.77–5.28)
SODIUM BLD-SCNC: 145 MMOL/L (ref 136–145)
WBC NRBC COR # BLD: 14.12 10*3/MM3 (ref 3.4–10.8)

## 2019-06-24 PROCEDURE — 36415 COLL VENOUS BLD VENIPUNCTURE: CPT

## 2019-06-24 PROCEDURE — 80053 COMPREHEN METABOLIC PANEL: CPT

## 2019-06-24 PROCEDURE — 85025 COMPLETE CBC W/AUTO DIFF WBC: CPT

## 2019-06-26 ENCOUNTER — HOSPITAL ENCOUNTER (OUTPATIENT)
Facility: HOSPITAL | Age: 74
Discharge: HOME | End: 2019-06-27
Attending: INTERNAL MEDICINE | Admitting: INTERNAL MEDICINE

## 2019-06-26 DIAGNOSIS — R94.39 ABNORMAL NUCLEAR STRESS TEST: ICD-10-CM

## 2019-06-26 LAB — ACT BLD: 263 SECONDS (ref 82–152)

## 2019-06-26 PROCEDURE — 25010000002 HEPARIN (PORCINE) PER 1000 UNITS: Performed by: INTERNAL MEDICINE

## 2019-06-26 PROCEDURE — 25010000002 DIPHENHYDRAMINE PER 50 MG: Performed by: INTERNAL MEDICINE

## 2019-06-26 PROCEDURE — 63710000001 LISINOPRIL 2.5 MG TABLET: Performed by: INTERNAL MEDICINE

## 2019-06-26 PROCEDURE — 63710000001 BUDESONIDE 3 MG CAPSULE DELAYED-RELEASE PARTICLES: Performed by: INTERNAL MEDICINE

## 2019-06-26 PROCEDURE — C9600 PERC DRUG-EL COR STENT SING: HCPCS | Performed by: INTERNAL MEDICINE

## 2019-06-26 PROCEDURE — C1887 CATHETER, GUIDING: HCPCS | Performed by: INTERNAL MEDICINE

## 2019-06-26 PROCEDURE — 25010000002 MIDAZOLAM PER 1 MG: Performed by: INTERNAL MEDICINE

## 2019-06-26 PROCEDURE — C1769 GUIDE WIRE: HCPCS | Performed by: INTERNAL MEDICINE

## 2019-06-26 PROCEDURE — C1894 INTRO/SHEATH, NON-LASER: HCPCS | Performed by: INTERNAL MEDICINE

## 2019-06-26 PROCEDURE — A9270 NON-COVERED ITEM OR SERVICE: HCPCS | Performed by: INTERNAL MEDICINE

## 2019-06-26 PROCEDURE — 93458 L HRT ARTERY/VENTRICLE ANGIO: CPT | Performed by: INTERNAL MEDICINE

## 2019-06-26 PROCEDURE — 94799 UNLISTED PULMONARY SVC/PX: CPT

## 2019-06-26 PROCEDURE — 99152 MOD SED SAME PHYS/QHP 5/>YRS: CPT | Performed by: INTERNAL MEDICINE

## 2019-06-26 PROCEDURE — 63710000001 ASPIRIN 81 MG TABLET DELAYED-RELEASE: Performed by: INTERNAL MEDICINE

## 2019-06-26 PROCEDURE — 99153 MOD SED SAME PHYS/QHP EA: CPT | Performed by: INTERNAL MEDICINE

## 2019-06-26 PROCEDURE — C1874 STENT, COATED/COV W/DEL SYS: HCPCS | Performed by: INTERNAL MEDICINE

## 2019-06-26 PROCEDURE — C1725 CATH, TRANSLUMIN NON-LASER: HCPCS | Performed by: INTERNAL MEDICINE

## 2019-06-26 PROCEDURE — 63710000001 ANASTROZOLE 1 MG TABLET: Performed by: INTERNAL MEDICINE

## 2019-06-26 PROCEDURE — 25010000002 FENTANYL CITRATE (PF) 100 MCG/2ML SOLUTION: Performed by: INTERNAL MEDICINE

## 2019-06-26 PROCEDURE — 93005 ELECTROCARDIOGRAM TRACING: CPT | Performed by: INTERNAL MEDICINE

## 2019-06-26 PROCEDURE — 92928 PRQ TCAT PLMT NTRAC ST 1 LES: CPT | Performed by: INTERNAL MEDICINE

## 2019-06-26 PROCEDURE — 0 IOPAMIDOL PER 1 ML: Performed by: INTERNAL MEDICINE

## 2019-06-26 PROCEDURE — 63710000001 METOPROLOL TARTRATE 25 MG TABLET: Performed by: INTERNAL MEDICINE

## 2019-06-26 PROCEDURE — 63710000001 ROSUVASTATIN 20 MG TABLET: Performed by: INTERNAL MEDICINE

## 2019-06-26 PROCEDURE — S0260 H&P FOR SURGERY: HCPCS | Performed by: INTERNAL MEDICINE

## 2019-06-26 PROCEDURE — 63710000001 DIAZEPAM 5 MG TABLET: Performed by: INTERNAL MEDICINE

## 2019-06-26 PROCEDURE — 85347 COAGULATION TIME ACTIVATED: CPT

## 2019-06-26 DEVICE — XIENCE SIERRA™ EVEROLIMUS ELUTING CORONARY STENT SYSTEM 2.50 MM X 12 MM / RAPID-EXCHANGE
Type: IMPLANTABLE DEVICE | Status: FUNCTIONAL
Brand: XIENCE SIERRA™

## 2019-06-26 RX ORDER — NICARDIPINE HCL-0.9% SOD CHLOR 1 MG/10 ML
SYRINGE (ML) INTRAVENOUS AS NEEDED
Status: DISCONTINUED | OUTPATIENT
Start: 2019-06-26 | End: 2019-06-26 | Stop reason: HOSPADM

## 2019-06-26 RX ORDER — SODIUM CHLORIDE 9 MG/ML
INJECTION, SOLUTION INTRAVENOUS CONTINUOUS PRN
Status: DISCONTINUED | OUTPATIENT
Start: 2019-06-26 | End: 2019-06-26 | Stop reason: HOSPADM

## 2019-06-26 RX ORDER — BENZONATATE 200 MG/1
200 CAPSULE ORAL 3 TIMES DAILY PRN
COMMUNITY
End: 2020-04-08 | Stop reason: ALTCHOICE

## 2019-06-26 RX ORDER — MIDAZOLAM HYDROCHLORIDE 1 MG/ML
INJECTION INTRAMUSCULAR; INTRAVENOUS AS NEEDED
Status: DISCONTINUED | OUTPATIENT
Start: 2019-06-26 | End: 2019-06-26 | Stop reason: HOSPADM

## 2019-06-26 RX ORDER — BUDESONIDE 3 MG/1
6 CAPSULE, COATED PELLETS ORAL EVERY MORNING
Status: DISCONTINUED | OUTPATIENT
Start: 2019-06-26 | End: 2019-06-27 | Stop reason: HOSPADM

## 2019-06-26 RX ORDER — LIDOCAINE HYDROCHLORIDE 20 MG/ML
INJECTION, SOLUTION INFILTRATION; PERINEURAL AS NEEDED
Status: DISCONTINUED | OUTPATIENT
Start: 2019-06-26 | End: 2019-06-26 | Stop reason: HOSPADM

## 2019-06-26 RX ORDER — LORAZEPAM 1 MG/1
1 TABLET ORAL EVERY 6 HOURS PRN
Status: DISCONTINUED | OUTPATIENT
Start: 2019-06-26 | End: 2019-06-27 | Stop reason: HOSPADM

## 2019-06-26 RX ORDER — HYDROCODONE BITARTRATE AND ACETAMINOPHEN 7.5; 325 MG/1; MG/1
1 TABLET ORAL EVERY 6 HOURS PRN
Status: CANCELLED | OUTPATIENT
Start: 2019-06-26

## 2019-06-26 RX ORDER — CLONAZEPAM 1 MG/1
1 TABLET ORAL 2 TIMES DAILY PRN
COMMUNITY
End: 2020-04-08 | Stop reason: ALTCHOICE

## 2019-06-26 RX ORDER — BUSPIRONE HYDROCHLORIDE 30 MG/1
15 TABLET ORAL 2 TIMES DAILY PRN
COMMUNITY

## 2019-06-26 RX ORDER — FENTANYL CITRATE 50 UG/ML
INJECTION, SOLUTION INTRAMUSCULAR; INTRAVENOUS AS NEEDED
Status: DISCONTINUED | OUTPATIENT
Start: 2019-06-26 | End: 2019-06-26 | Stop reason: HOSPADM

## 2019-06-26 RX ORDER — DIPHENHYDRAMINE HYDROCHLORIDE 50 MG/ML
INJECTION INTRAMUSCULAR; INTRAVENOUS AS NEEDED
Status: DISCONTINUED | OUTPATIENT
Start: 2019-06-26 | End: 2019-06-26 | Stop reason: HOSPADM

## 2019-06-26 RX ORDER — ANASTROZOLE 1 MG/1
1 TABLET ORAL DAILY
Status: DISCONTINUED | OUTPATIENT
Start: 2019-06-26 | End: 2019-06-27 | Stop reason: HOSPADM

## 2019-06-26 RX ORDER — SODIUM CHLORIDE 9 MG/ML
100 INJECTION, SOLUTION INTRAVENOUS ONCE
Status: COMPLETED | OUTPATIENT
Start: 2019-06-26 | End: 2019-06-26

## 2019-06-26 RX ORDER — BENZONATATE 100 MG/1
200 CAPSULE ORAL 3 TIMES DAILY PRN
Status: CANCELLED | OUTPATIENT
Start: 2019-06-26

## 2019-06-26 RX ORDER — CLOPIDOGREL BISULFATE 75 MG/1
600 TABLET ORAL ONCE
Status: DISCONTINUED | OUTPATIENT
Start: 2019-06-26 | End: 2019-06-27 | Stop reason: HOSPADM

## 2019-06-26 RX ORDER — LISINOPRIL 2.5 MG/1
5 TABLET ORAL DAILY
Status: DISCONTINUED | OUTPATIENT
Start: 2019-06-26 | End: 2019-06-27

## 2019-06-26 RX ORDER — DIAZEPAM 5 MG/1
5 TABLET ORAL ONCE
Status: DISCONTINUED | OUTPATIENT
Start: 2019-06-26 | End: 2019-06-26 | Stop reason: HOSPADM

## 2019-06-26 RX ORDER — CLOPIDOGREL BISULFATE 75 MG/1
75 TABLET ORAL DAILY
Status: DISCONTINUED | OUTPATIENT
Start: 2019-06-27 | End: 2019-06-27 | Stop reason: HOSPADM

## 2019-06-26 RX ORDER — CLONAZEPAM 1 MG/1
1 TABLET ORAL 2 TIMES DAILY PRN
Status: CANCELLED | OUTPATIENT
Start: 2019-06-26

## 2019-06-26 RX ORDER — ONDANSETRON 2 MG/ML
4 INJECTION INTRAMUSCULAR; INTRAVENOUS EVERY 6 HOURS PRN
Status: DISCONTINUED | OUTPATIENT
Start: 2019-06-26 | End: 2019-06-27 | Stop reason: HOSPADM

## 2019-06-26 RX ORDER — ASPIRIN 81 MG/1
81 TABLET ORAL DAILY
Status: DISCONTINUED | OUTPATIENT
Start: 2019-06-26 | End: 2019-06-27 | Stop reason: HOSPADM

## 2019-06-26 RX ORDER — ONDANSETRON 4 MG/1
4 TABLET, FILM COATED ORAL EVERY 6 HOURS PRN
Status: DISCONTINUED | OUTPATIENT
Start: 2019-06-26 | End: 2019-06-27 | Stop reason: HOSPADM

## 2019-06-26 RX ORDER — HEPARIN SODIUM 1000 [USP'U]/ML
INJECTION, SOLUTION INTRAVENOUS; SUBCUTANEOUS AS NEEDED
Status: DISCONTINUED | OUTPATIENT
Start: 2019-06-26 | End: 2019-06-26 | Stop reason: HOSPADM

## 2019-06-26 RX ORDER — CLOPIDOGREL BISULFATE 75 MG/1
TABLET ORAL AS NEEDED
Status: DISCONTINUED | OUTPATIENT
Start: 2019-06-26 | End: 2019-06-26 | Stop reason: HOSPADM

## 2019-06-26 RX ORDER — HYDROCODONE BITARTRATE AND ACETAMINOPHEN 7.5; 325 MG/1; MG/1
1 TABLET ORAL EVERY 6 HOURS PRN
Status: DISCONTINUED | OUTPATIENT
Start: 2019-06-26 | End: 2019-06-27 | Stop reason: HOSPADM

## 2019-06-26 RX ORDER — ROSUVASTATIN CALCIUM 20 MG/1
20 TABLET, COATED ORAL NIGHTLY
Status: DISCONTINUED | OUTPATIENT
Start: 2019-06-26 | End: 2019-06-27 | Stop reason: HOSPADM

## 2019-06-26 RX ADMIN — ROSUVASTATIN CALCIUM 20 MG: 20 TABLET, FILM COATED ORAL at 20:29

## 2019-06-26 RX ADMIN — METOPROLOL TARTRATE 25 MG: 25 TABLET, FILM COATED ORAL at 20:29

## 2019-06-26 RX ADMIN — DIAZEPAM 5 MG: 5 TABLET ORAL at 08:29

## 2019-06-26 RX ADMIN — BUDESONIDE 6 MG: 3 CAPSULE, COATED PELLETS ORAL at 12:04

## 2019-06-26 RX ADMIN — ANASTROZOLE 1 MG: 1 TABLET ORAL at 12:04

## 2019-06-26 RX ADMIN — ASPIRIN 81 MG: 81 TABLET ORAL at 12:04

## 2019-06-26 RX ADMIN — LISINOPRIL 5 MG: 2.5 TABLET ORAL at 12:04

## 2019-06-26 RX ADMIN — METOPROLOL TARTRATE 25 MG: 25 TABLET, FILM COATED ORAL at 12:04

## 2019-06-26 RX ADMIN — SODIUM CHLORIDE 100 ML/HR: 9 INJECTION, SOLUTION INTRAVENOUS at 10:28

## 2019-06-27 VITALS
OXYGEN SATURATION: 95 % | DIASTOLIC BLOOD PRESSURE: 55 MMHG | SYSTOLIC BLOOD PRESSURE: 117 MMHG | HEIGHT: 63 IN | BODY MASS INDEX: 21.71 KG/M2 | HEART RATE: 59 BPM | RESPIRATION RATE: 20 BRPM | WEIGHT: 122.5 LBS | TEMPERATURE: 97.7 F

## 2019-06-27 LAB
ANION GAP SERPL CALCULATED.3IONS-SCNC: 12.6 MMOL/L (ref 5–15)
BUN BLD-MCNC: 8 MG/DL (ref 8–23)
BUN/CREAT SERPL: 10.4 (ref 7–25)
CALCIUM SPEC-SCNC: 9.3 MG/DL (ref 8.6–10.5)
CHLORIDE SERPL-SCNC: 105 MMOL/L (ref 98–107)
CO2 SERPL-SCNC: 24.4 MMOL/L (ref 22–29)
CREAT BLD-MCNC: 0.77 MG/DL (ref 0.57–1)
DEPRECATED RDW RBC AUTO: 49.7 FL (ref 37–54)
ERYTHROCYTE [DISTWIDTH] IN BLOOD BY AUTOMATED COUNT: 14 % (ref 12.3–15.4)
GFR SERPL CREATININE-BSD FRML MDRD: 74 ML/MIN/1.73
GLUCOSE BLD-MCNC: 109 MG/DL (ref 65–99)
HCT VFR BLD AUTO: 40.1 % (ref 34–46.6)
HGB BLD-MCNC: 13.1 G/DL (ref 12–15.9)
MCH RBC QN AUTO: 32.6 PG (ref 26.6–33)
MCHC RBC AUTO-ENTMCNC: 32.7 G/DL (ref 31.5–35.7)
MCV RBC AUTO: 99.8 FL (ref 79–97)
PLATELET # BLD AUTO: 315 10*3/MM3 (ref 140–450)
PMV BLD AUTO: 9.6 FL (ref 6–12)
POTASSIUM BLD-SCNC: 4 MMOL/L (ref 3.5–5.2)
RBC # BLD AUTO: 4.02 10*6/MM3 (ref 3.77–5.28)
SODIUM BLD-SCNC: 142 MMOL/L (ref 136–145)
WBC NRBC COR # BLD: 12.83 10*3/MM3 (ref 3.4–10.8)

## 2019-06-27 PROCEDURE — 99214 OFFICE O/P EST MOD 30 MIN: CPT | Performed by: INTERNAL MEDICINE

## 2019-06-27 PROCEDURE — A9270 NON-COVERED ITEM OR SERVICE: HCPCS | Performed by: INTERNAL MEDICINE

## 2019-06-27 PROCEDURE — 93005 ELECTROCARDIOGRAM TRACING: CPT | Performed by: INTERNAL MEDICINE

## 2019-06-27 PROCEDURE — 63710000001 ASPIRIN 81 MG TABLET DELAYED-RELEASE: Performed by: INTERNAL MEDICINE

## 2019-06-27 PROCEDURE — 85027 COMPLETE CBC AUTOMATED: CPT | Performed by: INTERNAL MEDICINE

## 2019-06-27 PROCEDURE — 63710000001 BUDESONIDE 3 MG CAPSULE DELAYED-RELEASE PARTICLES: Performed by: INTERNAL MEDICINE

## 2019-06-27 PROCEDURE — 63710000001 BUSPIRONE 10 MG TABLET: Performed by: INTERNAL MEDICINE

## 2019-06-27 PROCEDURE — 63710000001 CLOPIDOGREL 75 MG TABLET: Performed by: INTERNAL MEDICINE

## 2019-06-27 PROCEDURE — 63710000001 METOPROLOL TARTRATE 25 MG TABLET: Performed by: INTERNAL MEDICINE

## 2019-06-27 PROCEDURE — 63710000001 BUSPIRONE 5 MG TABLET: Performed by: INTERNAL MEDICINE

## 2019-06-27 PROCEDURE — 63710000001 ANASTROZOLE 1 MG TABLET: Performed by: INTERNAL MEDICINE

## 2019-06-27 PROCEDURE — 80048 BASIC METABOLIC PNL TOTAL CA: CPT | Performed by: INTERNAL MEDICINE

## 2019-06-27 PROCEDURE — 94799 UNLISTED PULMONARY SVC/PX: CPT

## 2019-06-27 PROCEDURE — 63710000001 LISINOPRIL 2.5 MG TABLET: Performed by: INTERNAL MEDICINE

## 2019-06-27 RX ORDER — CLOPIDOGREL BISULFATE 75 MG/1
75 TABLET ORAL DAILY
Qty: 90 TABLET | Refills: 3 | Status: SHIPPED | OUTPATIENT
Start: 2019-06-27 | End: 2019-10-24 | Stop reason: SDUPTHER

## 2019-06-27 RX ORDER — CILOSTAZOL 100 MG/1
50 TABLET ORAL 2 TIMES DAILY
Qty: 30 TABLET | Refills: 3 | Status: SHIPPED | OUTPATIENT
Start: 2019-06-27 | End: 2019-06-27 | Stop reason: HOSPADM

## 2019-06-27 RX ORDER — LOSARTAN POTASSIUM 25 MG/1
25 TABLET ORAL DAILY
Status: DISCONTINUED | OUTPATIENT
Start: 2019-06-28 | End: 2019-06-27 | Stop reason: HOSPADM

## 2019-06-27 RX ORDER — NICOTINE 21 MG/24HR
1 PATCH, TRANSDERMAL 24 HOURS TRANSDERMAL EVERY 24 HOURS
Qty: 30 PATCH | Refills: 1 | Status: SHIPPED | OUTPATIENT
Start: 2019-06-27 | End: 2020-04-08 | Stop reason: ALTCHOICE

## 2019-06-27 RX ORDER — METOPROLOL TARTRATE 50 MG/1
50 TABLET, FILM COATED ORAL 2 TIMES DAILY
Qty: 60 TABLET | Refills: 3 | Status: SHIPPED | OUTPATIENT
Start: 2019-06-27 | End: 2019-10-24 | Stop reason: SDUPTHER

## 2019-06-27 RX ORDER — LOSARTAN POTASSIUM 25 MG/1
25 TABLET ORAL DAILY
Qty: 30 TABLET | Refills: 3 | Status: SHIPPED | OUTPATIENT
Start: 2019-06-27 | End: 2019-07-23 | Stop reason: SINTOL

## 2019-06-27 RX ADMIN — ASPIRIN 81 MG: 81 TABLET ORAL at 08:37

## 2019-06-27 RX ADMIN — CLOPIDOGREL 75 MG: 75 TABLET, FILM COATED ORAL at 08:37

## 2019-06-27 RX ADMIN — LISINOPRIL 5 MG: 2.5 TABLET ORAL at 08:36

## 2019-06-27 RX ADMIN — METOPROLOL TARTRATE 25 MG: 25 TABLET, FILM COATED ORAL at 08:36

## 2019-06-27 RX ADMIN — BUDESONIDE 6 MG: 3 CAPSULE, COATED PELLETS ORAL at 06:48

## 2019-06-27 RX ADMIN — BUSPIRONE HYDROCHLORIDE 15 MG: 10 TABLET ORAL at 08:44

## 2019-06-27 RX ADMIN — ANASTROZOLE 1 MG: 1 TABLET ORAL at 08:36

## 2019-06-28 ENCOUNTER — DOCUMENTATION (OUTPATIENT)
Dept: CARDIAC REHAB | Facility: HOSPITAL | Age: 74
End: 2019-06-28

## 2019-06-28 NOTE — PROGRESS NOTES
Spoke with pt regarding cardiac rehab. Pt stated she isn't able to do the program due to her living in [place].

## 2019-07-01 ENCOUNTER — TELEPHONE (OUTPATIENT)
Dept: CARDIOLOGY | Facility: CLINIC | Age: 74
End: 2019-07-01

## 2019-07-01 NOTE — TELEPHONE ENCOUNTER
----- Message from GENA Montgomery sent at 7/1/2019 11:00 AM EDT -----  Please bring her in ASAP for pacemaker interrogation. Thanks.  ----- Message -----  From: MIGUEL Montalvo MD  Sent: 6/29/2019   2:33 PM  To: Salvador Mccarthy MD, GENA Montgomery    I was reading EKGs this weekend, this patient's EKG would suggest that she is having possibly some atrial lead non-sensing on her pacemaker, she follows with Dr. Mccarthy in the office, please arrange pacemaker evaluation

## 2019-07-11 ENCOUNTER — CLINICAL SUPPORT (OUTPATIENT)
Dept: CARDIOLOGY | Facility: CLINIC | Age: 74
End: 2019-07-11

## 2019-07-11 DIAGNOSIS — I49.5 SSS (SICK SINUS SYNDROME) (HCC): Primary | ICD-10-CM

## 2019-07-11 PROCEDURE — 93280 PM DEVICE PROGR EVAL DUAL: CPT | Performed by: INTERNAL MEDICINE

## 2019-07-23 ENCOUNTER — OFFICE VISIT (OUTPATIENT)
Dept: CARDIOLOGY | Facility: CLINIC | Age: 74
End: 2019-07-23

## 2019-07-23 VITALS
BODY MASS INDEX: 20.73 KG/M2 | SYSTOLIC BLOOD PRESSURE: 100 MMHG | OXYGEN SATURATION: 98 % | HEART RATE: 59 BPM | DIASTOLIC BLOOD PRESSURE: 51 MMHG | HEIGHT: 63 IN | WEIGHT: 117 LBS

## 2019-07-23 DIAGNOSIS — E78.5 DYSLIPIDEMIA: ICD-10-CM

## 2019-07-23 DIAGNOSIS — I49.5 SSS (SICK SINUS SYNDROME) (HCC): ICD-10-CM

## 2019-07-23 DIAGNOSIS — I10 ESSENTIAL HYPERTENSION: ICD-10-CM

## 2019-07-23 DIAGNOSIS — I25.10 ASCVD (ARTERIOSCLEROTIC CARDIOVASCULAR DISEASE): Primary | ICD-10-CM

## 2019-07-23 PROCEDURE — 99213 OFFICE O/P EST LOW 20 MIN: CPT | Performed by: INTERNAL MEDICINE

## 2019-07-23 NOTE — PROGRESS NOTES
"Yasmine Reynolds MD  Sirisha STEVENSON José Miguel  1945  07/23/2019    Patient Active Problem List   Diagnosis   • Arteriosclerotic cardiovascular disease (ASCVD), s/p stenting in 1999, after a myocardial infarction.  And repeat stenting in 2005 of the LAD and RCA at [facility]    • SSS (sick sinus syndrome), s/p permanent pacemaker implantion,    • Palpitations   • Dizziness   • Dyslipidemia, currently on statin   • Essential hypertension, well-contolled.    • PVD (peripheral vascular disease) (CMS/HCC)   • Swelling of both lower extremities   • Chronic left shoulder pain   • S/P arthroscopy of shoulder   • Coagulopathy (CMS/HCC)   • Bilateral malignant neoplasm of breast in female, estrogen receptor positive (CMS/HCC)   • Tobacco abuse   • Aromatase inhibitor use   • Osteopenia   • Abnormal EKG   • Abnormal nuclear stress test       Dear Yasmine Reynolds MD:    Subjective     Sirisha Valdez is a 73 y.o. female with the problems as listed above, presents    Chief Complaint: Follow-up of coronary artery disease.       History of Present Illness: Ms. Valdez is a pleasant 73-year-old  female with history of known ASCVD, status post previous PCI in 1999 and then in 2005 involving LAD and RCA and then recently on 6/26/2019 involving the first OM branch where she had a drug-eluting stent placed.  She is here for regular cardiology follow-up.  She complains of bruising on her extremities and was asking if she could cut back on the \"blood thinners\".  She denies any hematemesis or melena or hematochezia or hematuria.      Allergies   Allergen Reactions   • Promethazine      hypotension   :      Current Outpatient Medications:   •  anastrozole (ARIMIDEX) 1 MG tablet, TAKE 1 TABLET DAILY, Disp: 30 tablet, Rfl: 5  •  aspirin 81 MG EC tablet, Take 81 mg by mouth daily., Disp: , Rfl:   •  benzonatate (TESSALON) 200 MG capsule, Take 200 mg by mouth 3 (Three) Times a Day As Needed for Cough., Disp: , Rfl:   •  " Budesonide (ENTOCORT EC) 3 MG 24 hr capsule, Take 9 mg by mouth Every Morning., Disp: , Rfl:   •  busPIRone (BUSPAR) 30 MG tablet, Take 15 mg by mouth 2 (Two) Times a Day As Needed (anxiety)., Disp: , Rfl:   •  clonazePAM (KlonoPIN) 1 MG tablet, Take 1 mg by mouth 2 (Two) Times a Day As Needed for Anxiety., Disp: , Rfl:   •  clopidogrel (PLAVIX) 75 MG tablet, Take 1 tablet by mouth Daily., Disp: 90 tablet, Rfl: 3  •  HYDROcodone-acetaminophen (NORCO) 7.5-325 MG per tablet, Take 1 tablet by mouth Every 6 (Six) Hours As Needed for Moderate Pain ., Disp: , Rfl:   •  losartan (COZAAR) 25 MG tablet, Take 1 tablet by mouth Daily., Disp: 30 tablet, Rfl: 3  •  metoprolol tartrate (LOPRESSOR) 50 MG tablet, Take 1 tablet by mouth 2 (Two) Times a Day., Disp: 60 tablet, Rfl: 3  •  nicotine (NICODERM CQ) 14 MG/24HR patch, Place 1 patch on the skin as directed by provider Daily., Disp: 30 patch, Rfl: 1  •  nitroglycerin (NITROSTAT) 0.4 MG SL tablet, Place 0.4 mg under the tongue Every 5 (Five) Minutes As Needed for Chest Pain. Take no more than 3 doses in 15 minutes., Disp: , Rfl:   •  rosuvastatin (CRESTOR) 40 MG tablet, Take 40 mg by mouth Every Night., Disp: , Rfl: 5      The following portions of the patient's history were reviewed and updated as appropriate: allergies, current medications, past family history, past medical history, past social history, past surgical history and problem list.    Social History     Tobacco Use   • Smoking status: Current Every Day Smoker     Packs/day: 1.00     Years: 54.00     Pack years: 54.00     Types: Cigarettes     Start date: 1961   • Smokeless tobacco: Never Used   Substance Use Topics   • Alcohol use: No   • Drug use: No       Review of Systems   Cardiovascular: Positive for leg swelling. Negative for chest pain, palpitations and syncope.   Respiratory: Negative for shortness of breath.    Hematologic/Lymphatic: Bruises/bleeds easily.   Neurological: Negative for dizziness.  "      Objective   Vitals:    07/23/19 1203   BP: 100/51   BP Location: Left arm   Patient Position: Sitting   Cuff Size: Adult   Pulse: 59   SpO2: 98%   Weight: 53.1 kg (117 lb)   Height: 160 cm (63\")     Body mass index is 20.73 kg/m².        Physical Exam   Constitutional: She is oriented to person, place, and time. She appears well-developed and well-nourished.   HENT:   Mouth/Throat: Oropharynx is clear and moist.   Eyes: EOM are normal. Pupils are equal, round, and reactive to light.   Neck: Neck supple. No JVD present. No tracheal deviation present. No thyromegaly present.   Cardiovascular: Normal rate, regular rhythm, S1 normal and S2 normal. Exam reveals no gallop and no friction rub.   No murmur heard.  Pulmonary/Chest: Effort normal and breath sounds normal.   Abdominal: Soft. Bowel sounds are normal. She exhibits no mass. There is no tenderness.   Musculoskeletal: Normal range of motion. She exhibits no edema.   Lymphadenopathy:     She has no cervical adenopathy.   Neurological: She is alert and oriented to person, place, and time.   Skin: Skin is warm and dry. No rash noted.   Has multiple areas of bruising on her upper and lower extremities.   Psychiatric: She has a normal mood and affect.       Lab Results   Component Value Date     06/27/2019    K 4.0 06/27/2019     06/27/2019    CO2 24.4 06/27/2019    BUN 8 06/27/2019    CREATININE 0.77 06/27/2019    GLUCOSE 109 (H) 06/27/2019    CALCIUM 9.3 06/27/2019    AST 12 06/24/2019    ALT 10 06/24/2019    ALKPHOS 65 06/24/2019    LABIL2 1.4 (L) 01/04/2016     Lab Results   Component Value Date    CKTOTAL 60 01/02/2016     Lab Results   Component Value Date    WBC 12.83 (H) 06/27/2019    HGB 13.1 06/27/2019    HCT 40.1 06/27/2019     06/27/2019       Lab Results   Component Value Date    MG 2.2 01/02/2016        Lab Results   Component Value Date    .0 (H) 06/19/2018         Procedures      Assessment/Plan :   Diagnosis Plan   1. " ASCVD (arteriosclerotic cardiovascular disease)   A)s/p acute MI, stenting 1999  B)s/p stenting of LAD and RCA in 2005.  C) s/p stenting of first OM branch of left circumflex on 6/26/2019 with [facility].    2. SSS (sick sinus syndrome), s/p permanent pacemaker implantion ([facility]) 5/19/2011 by Dr. Lambert.      3. Essential hypertension, well-contolled.      4. Dyslipidemia, on rosuvastatin 40 mg daily.        Recommendations:  1. Since the blood pressure is running low, we will go ahead and discontinue the losartan.  2. Continue with aspirin, Plavix, rosuvastatin and metoprolol at current doses.  3. Check fasting lipid panel and CMP.  4. I would like to continue the dual antiplatelet therapy at least for a year due to recent drug-eluting stent placement.    Return in about 3 months (around 10/23/2019).    As always, Dr. Reynolds,   I appreciate very much the opportunity to participate in the cardiovascular care of your patients. Please do not hesitate to call me with any questions with regards to Sirisha Valdez evaluation and management.         With Best Regards,        Salvador Mccarthy MD, [facility]    Dragon disclaimer:  Much of this encounter note is an electronic transcription/translation of spoken language to printed text. The electronic translation of spoken language may permit erroneous, or at times, nonsensical words or phrases to be inadvertently transcribed; Although I have reviewed the note for such errors, some may still exist.

## 2019-10-23 RX ORDER — METOPROLOL TARTRATE 50 MG/1
TABLET, FILM COATED ORAL
Qty: 60 TABLET | Refills: 3 | Status: CANCELLED | OUTPATIENT
Start: 2019-10-23

## 2019-10-24 ENCOUNTER — OFFICE VISIT (OUTPATIENT)
Dept: CARDIOLOGY | Facility: CLINIC | Age: 74
End: 2019-10-24

## 2019-10-24 VITALS
RESPIRATION RATE: 16 BRPM | WEIGHT: 116 LBS | BODY MASS INDEX: 20.55 KG/M2 | HEIGHT: 63 IN | HEART RATE: 59 BPM | DIASTOLIC BLOOD PRESSURE: 57 MMHG | SYSTOLIC BLOOD PRESSURE: 91 MMHG

## 2019-10-24 DIAGNOSIS — I49.5 SSS (SICK SINUS SYNDROME) (HCC): ICD-10-CM

## 2019-10-24 DIAGNOSIS — I73.9 PVD (PERIPHERAL VASCULAR DISEASE) (HCC): ICD-10-CM

## 2019-10-24 DIAGNOSIS — I25.10 ARTERIOSCLEROTIC CARDIOVASCULAR DISEASE (ASCVD): Primary | ICD-10-CM

## 2019-10-24 DIAGNOSIS — I10 ESSENTIAL HYPERTENSION: ICD-10-CM

## 2019-10-24 DIAGNOSIS — E78.5 DYSLIPIDEMIA: ICD-10-CM

## 2019-10-24 PROCEDURE — 99213 OFFICE O/P EST LOW 20 MIN: CPT | Performed by: INTERNAL MEDICINE

## 2019-10-24 RX ORDER — CLOPIDOGREL BISULFATE 75 MG/1
75 TABLET ORAL DAILY
Qty: 90 TABLET | Refills: 3 | Status: SHIPPED | OUTPATIENT
Start: 2019-10-24 | End: 2020-11-22

## 2019-10-24 RX ORDER — CHLORAL HYDRATE 500 MG
1000 CAPSULE ORAL
COMMUNITY
End: 2021-08-09

## 2019-10-24 NOTE — PROGRESS NOTES
"Yasmine Reynolds MD  Sirisha Valdez  1945  10/24/2019    Patient Active Problem List   Diagnosis   •  (ASCVD), s/p stenting in 1999, after an MI, repeat stenting in 2005 of the LAD and RCA at [facility] and stenting of the first OM branch of left circumflex on 6/26/2019.Clinically stable.   • SSS (sick sinus syndrome), s/p permanent pacemaker implantion,    • Palpitations   • Dizziness   • Dyslipidemia, currently on rosuvastatin 40 mg daily   • Essential hypertension, blood pressure is running low associated with fatigue.   • PVD (peripheral vascular disease) (CMS/HCC)   • Swelling of both lower extremities   • Chronic left shoulder pain   • S/P arthroscopy of shoulder   • Coagulopathy (CMS/HCC)   • Bilateral malignant neoplasm of breast in female, estrogen receptor positive (CMS/HCC)   • Tobacco abuse   • Aromatase inhibitor use   • Osteopenia   • Abnormal EKG   • Abnormal nuclear stress test       Dear Yasmine Reynolds MD:    Subjective     Sirisha Valdez is a 74 y.o. female with the problems as listed above, presents    Chief Complaint: Follow-up of coronary artery disease.     History of Present Illness: Ms. Valdez is a pleasant 74-year-old  female with history of known ASCVD, status post stenting of the LAD and RCA in 2005 and recent stenting of the obtuse marginal branch of the left circumflex coronary artery on 6/26/2019, is here for regular cardiology follow-up.  On today's visit she denies any complaints of chest pains or any significant shortness of breath, PND, orthopnea pedal edema.  She denies any bleeding problems from aspirin and Plavix.  Continues smoke about half pack a day despite repeated advice against smoking.  States she has tried Chantix before which made her feel \"crazy\".  She has also tried nicotine patches with no success.    Allergies   Allergen Reactions   • Promethazine      hypotension   :      Current Outpatient Medications:   •  anastrozole " (ARIMIDEX) 1 MG tablet, TAKE 1 TABLET DAILY, Disp: 30 tablet, Rfl: 5  •  aspirin 81 MG EC tablet, Take 81 mg by mouth daily., Disp: , Rfl:   •  busPIRone (BUSPAR) 30 MG tablet, Take 15 mg by mouth 2 (Two) Times a Day As Needed (anxiety)., Disp: , Rfl:   •  clonazePAM (KlonoPIN) 1 MG tablet, Take 1 mg by mouth 2 (Two) Times a Day As Needed for Anxiety., Disp: , Rfl:   •  clopidogrel (PLAVIX) 75 MG tablet, Take 1 tablet by mouth Daily., Disp: 90 tablet, Rfl: 3  •  HYDROcodone-acetaminophen (NORCO) 7.5-325 MG per tablet, Take 1 tablet by mouth Every 6 (Six) Hours As Needed for Moderate Pain ., Disp: , Rfl:   •  metoprolol tartrate (LOPRESSOR) 50 MG tablet, Take 1 tablet by mouth 2 (Two) Times a Day., Disp: 60 tablet, Rfl: 3  •  nicotine (NICODERM CQ) 14 MG/24HR patch, Place 1 patch on the skin as directed by provider Daily., Disp: 30 patch, Rfl: 1  •  nitroglycerin (NITROSTAT) 0.4 MG SL tablet, Place 0.4 mg under the tongue Every 5 (Five) Minutes As Needed for Chest Pain. Take no more than 3 doses in 15 minutes., Disp: , Rfl:   •  Omega-3 Fatty Acids (FISH OIL) 1000 MG capsule capsule, Take 1,000 mg by mouth Daily With Breakfast., Disp: , Rfl:   •  rosuvastatin (CRESTOR) 40 MG tablet, Take 40 mg by mouth Every Night., Disp: , Rfl: 5  •  benzonatate (TESSALON) 200 MG capsule, Take 200 mg by mouth 3 (Three) Times a Day As Needed for Cough., Disp: , Rfl:   •  Budesonide (ENTOCORT EC) 3 MG 24 hr capsule, Take 9 mg by mouth Every Morning., Disp: , Rfl:       The following portions of the patient's history were reviewed and updated as appropriate: allergies, current medications, past family history, past medical history, past social history, past surgical history and problem list.    Social History     Tobacco Use   • Smoking status: Current Every Day Smoker     Packs/day: 1.00     Years: 54.00     Pack years: 54.00     Types: Cigarettes     Start date: 1961   • Smokeless tobacco: Never Used   Substance Use Topics   •  "Alcohol use: No   • Drug use: No       Review of Systems   Constitution: Positive for malaise/fatigue.   Cardiovascular: Negative for chest pain, leg swelling and palpitations.   Respiratory: Positive for shortness of breath.        Objective   Vitals:    10/24/19 1205   BP: 91/57   Pulse: 59   Resp: 16   Weight: 52.6 kg (116 lb)   Height: 160 cm (63\")     Body mass index is 20.55 kg/m².        Physical Exam   Constitutional: She is oriented to person, place, and time. She appears well-developed and well-nourished.   HENT:   Head: Normocephalic.   Mouth/Throat: Oropharynx is clear and moist.   Eyes: Conjunctivae and EOM are normal. Pupils are equal, round, and reactive to light.   Neck: Normal range of motion. Neck supple. No JVD present. No tracheal deviation present. No thyromegaly present.   Cardiovascular: Normal rate, regular rhythm, S1 normal and S2 normal. Exam reveals no gallop and no friction rub.   No murmur heard.  Pulmonary/Chest: Effort normal and breath sounds normal. No respiratory distress. She has no wheezes. She has no rales.   Abdominal: Soft. Bowel sounds are normal. She exhibits no mass. There is no tenderness.   Musculoskeletal: Normal range of motion. She exhibits no edema.   Lymphadenopathy:     She has no cervical adenopathy.   Neurological: She is alert and oriented to person, place, and time. No cranial nerve deficit.   Skin: Skin is warm and dry. No rash noted.   Psychiatric: She has a normal mood and affect.       Lab Results   Component Value Date     06/27/2019    K 4.0 06/27/2019     06/27/2019    CO2 24.4 06/27/2019    BUN 8 06/27/2019    CREATININE 0.77 06/27/2019    GLUCOSE 109 (H) 06/27/2019    CALCIUM 9.3 06/27/2019    AST 12 06/24/2019    ALT 10 06/24/2019    ALKPHOS 65 06/24/2019    LABIL2 1.4 (L) 01/04/2016     Lab Results   Component Value Date    CKTOTAL 60 01/02/2016     Lab Results   Component Value Date    WBC 12.83 (H) 06/27/2019    HGB 13.1 06/27/2019    HCT " 40.1 06/27/2019     06/27/2019        Lab Results   Component Value Date    .0 (H) 06/19/2018     Procedures    Assessment/Plan    Diagnosis Plan   1.  (ASCVD), s/p stenting in 1999, after an MI, repeat stenting in 2005 of the LAD and RCA at [facility] and stenting of the first OM branch of left circumflex on 6/26/2019.Clinically stable.     2. SSS (sick sinus syndrome), s/p permanent pacemaker implantion, clinically stable.    3. Essential hypertension, blood pressure is running low but low.     4. PVD (peripheral vascular disease) (CMS/HCC)     5. Dyslipidemia, currently on rosuvastatin 40 mg daily     6. Tobacco abuse.        Recommendations:  1. Continue with aspirin, Plavix and decrease the metoprolol to 25 mg p.o. twice daily due to baseline low blood pressure and fatigue. I have discussed this with the patient and she expressed understanding.  2. She stated that she had her lipid panel and liver panels are being monitored by her PCP.  3. I have strongly reemphasized for her to quit smoking.  She has already tried Chantix and nicotine patches apparently with no success.    Return in about 5 months (around 3/24/2020).    As always,   I appreciate very much the opportunity to participate in the cardiovascular care of your patients. Please do not hesitate to call me with any questions with regards to Gurjitjossieprem ELVA Valdez evaluation and management.       With Best Regards,        Salvador Mccarthy MD, [facility]    Please note that portions of this note were completed with a voice recognition program.

## 2019-11-26 ENCOUNTER — HOSPITAL ENCOUNTER (OUTPATIENT)
Dept: MAMMOGRAPHY | Facility: HOSPITAL | Age: 74
Discharge: HOME | End: 2019-11-26
Admitting: RADIOLOGY

## 2019-11-26 DIAGNOSIS — R92.8 ABNORMAL MAMMOGRAM: ICD-10-CM

## 2019-11-26 PROCEDURE — 77066 DX MAMMO INCL CAD BI: CPT

## 2019-11-26 PROCEDURE — 77066 DX MAMMO INCL CAD BI: CPT | Performed by: RADIOLOGY

## 2019-11-26 PROCEDURE — G0279 TOMOSYNTHESIS, MAMMO: HCPCS

## 2019-11-26 PROCEDURE — G0279 TOMOSYNTHESIS, MAMMO: HCPCS | Performed by: RADIOLOGY

## 2019-11-27 ENCOUNTER — OFFICE VISIT (OUTPATIENT)
Dept: SURGERY | Facility: CLINIC | Age: 74
End: 2019-11-27

## 2019-11-27 VITALS
DIASTOLIC BLOOD PRESSURE: 64 MMHG | HEART RATE: 71 BPM | HEIGHT: 63 IN | BODY MASS INDEX: 20.8 KG/M2 | WEIGHT: 117.4 LBS | SYSTOLIC BLOOD PRESSURE: 130 MMHG

## 2019-11-27 DIAGNOSIS — Z79.811 AROMATASE INHIBITOR USE: ICD-10-CM

## 2019-11-27 DIAGNOSIS — Z17.0 MALIGNANT NEOPLASM OF CENTRAL PORTION OF BOTH BREASTS IN FEMALE, ESTROGEN RECEPTOR POSITIVE (HCC): Primary | ICD-10-CM

## 2019-11-27 DIAGNOSIS — C50.111 MALIGNANT NEOPLASM OF CENTRAL PORTION OF BOTH BREASTS IN FEMALE, ESTROGEN RECEPTOR POSITIVE (HCC): Primary | ICD-10-CM

## 2019-11-27 DIAGNOSIS — C50.112 MALIGNANT NEOPLASM OF CENTRAL PORTION OF BOTH BREASTS IN FEMALE, ESTROGEN RECEPTOR POSITIVE (HCC): Primary | ICD-10-CM

## 2019-11-27 PROCEDURE — 99214 OFFICE O/P EST MOD 30 MIN: CPT | Performed by: SURGERY

## 2019-11-27 NOTE — PROGRESS NOTES
Subjective   Sirisha Valdez is a 74 y.o. female here for follow up breast care and mammogram review.     History of Present Illness  Ms. Valdez was seen in the office today for breast cancer follow-up.  She is status post a left breast lumpectomy and sentinel lymph node biopsy and a right breast lumpectomy on 12/12/17.  Final pathology demonstrated left T1bN0 infiltrating ductal carcinoma, ER positive, WI positive, HER-2 negative.  The anterior margin was positive for invasive tumor and other margins were positive for DCIS.  The right lumpectomy demonstrated DCIS, ER positive, WI positive with close but clear margins.  The patient underwent margin reexcision of the left and the right breast on 12/27/17.  The right lumpectomy showed no residual disease and the left side demonstrated a very small focus of DCIS.   Due to the patient's age and clear margin status the decision was made to forego radiation.  The patient was started on anastrozole in January, 2018.  She states she is tolerating it well with no side effects.  The patient has no complaints referable to the breast.  She denies any arm swelling or axillary adenopathy.  She denies any new bone or joint pain or other symptoms of metastatic disease  The patient had a bone density study done on 1/29/18 which demonstrated normal lumbar spine, normal left hip, left radius with a T score of -1.1  Since the patient's last visit she had a cardiac stent placed in June 2019.  No other changes in her health.  Allergies   Allergen Reactions   • Promethazine      hypotension         Current Outpatient Medications   Medication Sig Dispense Refill   • anastrozole (ARIMIDEX) 1 MG tablet TAKE 1 TABLET DAILY 30 tablet 5   • aspirin 81 MG EC tablet Take 81 mg by mouth daily.     • benzonatate (TESSALON) 200 MG capsule Take 200 mg by mouth 3 (Three) Times a Day As Needed for Cough.     • Budesonide (ENTOCORT EC) 3 MG 24 hr capsule Take 9 mg by mouth Every Morning.     • busPIRone  (BUSPAR) 30 MG tablet Take 15 mg by mouth 2 (Two) Times a Day As Needed (anxiety).     • clonazePAM (KlonoPIN) 1 MG tablet Take 1 mg by mouth 2 (Two) Times a Day As Needed for Anxiety.     • clopidogrel (PLAVIX) 75 MG tablet Take 1 tablet by mouth Daily. 90 tablet 3   • HYDROcodone-acetaminophen (NORCO) 7.5-325 MG per tablet Take 1 tablet by mouth Every 6 (Six) Hours As Needed for Moderate Pain .     • metoprolol tartrate (LOPRESSOR) 25 MG tablet Take 1 tablet by mouth 2 (Two) Times a Day. 60 tablet 5   • nitroglycerin (NITROSTAT) 0.4 MG SL tablet Place 0.4 mg under the tongue Every 5 (Five) Minutes As Needed for Chest Pain. Take no more than 3 doses in 15 minutes.     • Omega-3 Fatty Acids (FISH OIL) 1000 MG capsule capsule Take 1,000 mg by mouth Daily With Breakfast.     • rosuvastatin (CRESTOR) 40 MG tablet Take 40 mg by mouth Every Night.  5   • nicotine (NICODERM CQ) 14 MG/24HR patch Place 1 patch on the skin as directed by provider Daily. 30 patch 1     No current facility-administered medications for this visit.      Past Medical History:   Diagnosis Date   • Arthritis    • Breast cancer (CMS/HCC) 11/28/2017    bilateral   • Coronary artery disease     3 HEART STENTS   • Gastroesophageal reflux    • Glaucoma    • Hypertension    • MI (myocardial infarction) (CMS/HCC)    • Migraine    • Peripheral arterial disease (CMS/HCC)     KIM LEG STENTS       ANDFEM POP   • Peripheral vascular disease (CMS/HCC)    • SSS (sick sinus syndrome) (CMS/HCC)    • Status post placement of cardiac pacemaker    • Wears dentures    • Wears glasses      Past Surgical History:   Procedure Laterality Date   • BREAST LUMPECTOMY Right 12/12/2017    Procedure: BREAST LUMPECTOMY WITH NEEDLE LOCALIZATION;  Surgeon: Lida Babin MD;  Location: [facility];  Service:    • BREAST LUMPECTOMY Bilateral 12/27/2017    Procedure: right breast re-excision  left breast re-excision with 3 cm inferior and superior advancement flap;  Surgeon:  "Lida Babin MD;  Location: [facility];  Service:    • BREAST LUMPECTOMY WITH SENTINEL NODE BIOPSY AND AXILLARY NODE DISSECTION Left 12/12/2017    Procedure: Left BREAST LUMPECTOMY WITH SENTINEL NODE BIOPSY  Isotope and Lymphazurin injection left breast. Left breast preoperative needle localization;  Surgeon: Lida Babin MD;  Location: [facility];  Service:    • BREAST SURGERY      Mass Removal.   • CARDIAC CATHETERIZATION     • CARDIAC CATHETERIZATION N/A 6/26/2019    Procedure: Left Heart Cath;  Surgeon: Salvador Mccarthy MD;  Location: [facility] INVASIVE LOCATION;  Service: Cardiology   • CARPAL TUNNEL RELEASE     • CATARACT EXTRACTION     • FEMORAL DISTAL BYPASS     • HIP SURGERY     • HYSTERECTOMY     • PACEMAKER IMPLANTATION     • PA RT/LT HEART CATHETERS N/A 6/26/2019    Procedure: Percutaneous Coronary Intervention;  Surgeon: Gus Suero MD;  Location: [facility] CATH INVASIVE LOCATION;  Service: Cardiology   • SHOULDER ARTHROSCOPY W/ ROTATOR CUFF REPAIR Left 3/8/2017    Procedure: LEFT SHOULDER ARTHROSCOPY, ACROMIOPLASTY, SUBACROMIC DEBRIDMENT;  Surgeon: Tesfaye Polo MD;  Location: [facility];  Service:    • SHOULDER SURGERY         The following portions of the patient's history were reviewed and updated as appropriate: allergies, current medications, past family history, past medical history, past social history, past surgical history and problem list.    Review of Systems  General: weight loss  Integumentary: negative  Eyes: negative  ENT: negative  Respiratory: shortness of breath and pneumonia  Gastrointestinal: loss of appetite  Cardiovascular: negative  Neurological: negative  Psychiatric: negative  Hematologic/Lymphatic: easy bleeding and easy bruising  Genitourinary: negative  Musculoskeletal: negative  Endocrine: negative  Breasts: negative    Objective   /64 (BP Location: Left arm, Patient Position: Sitting)   Pulse 71   Ht 160 cm (63\")   Wt 53.3 kg (117 lb 6.4 oz) "   BMI 20.80 kg/m²    Physical Exam  General:  This is a WD WN female in no acute distress  Vital signs stable, afebrile  HEENT exam:  WNL. Sclera are anicteric.  EOMI  Neck:  supple, FROM.  No JVD.  Trachea midline.  No palpable thyroid nodules. No supraclavicular adenopathy  Lungs:  Respiratory effort normal. Auscultation: Clear, without wheezes, rhonchi, rales  Heart:  Regular rate and rhythm, without murmur, gallop, rub.  No pedal edema  Breasts: On visual inspection there is some mild volume loss on the left..  Examination of the right breast demonstrates a well-healed circumareolar scar, without discrete mass, skin change, axillary adenopathy.  Examination of the left breast demonstrates a well-healed circumareolar scar, without discrete mass, skin change, axillary adenopathy.  Abdomen: Nontender, without hepatosplenomegaly  Musculoskeletal:  muscle strength/tone is normal.    Psyc:  alert, oriented x 3.  Mood and affect are appropriate  skin:  Warm with good turgor.  Extensive bruising secondary to Plavix  extremities:  Examination of the extremities revealed no cyanosis, clubbing or edema.    Results/Data  Mammogram reports and images were reviewed and agree with the assessment other than that the patient states she was told by Dr. James that one year follow-up was appropriate and they reports a 6-month follow-up.  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  Grade 1 infiltrating ductal carcinoma of the left breast T1b N0 MX, ER positive, [place] positive  Both sites with clear margins on reexcision  Osteopenia     Plan:  Continue anastrozole  Continue calcium and vitamin D  6-month follow-up with no studies  Patient to have follow-up bone density with Dr. Reynolds prior to next visit         Discussion/Summary    Patient's Body mass index is 20.8 kg/m². BMI is within normal parameters. No  follow-up required..         Future Appointments   Date Time Provider Department Center   4/9/2020 12:30 PM Salvador Mccarthy MD [facility] None         Please note that portions of this note were completed with a voice recognition program.

## 2020-01-09 DIAGNOSIS — C50.111 MALIGNANT NEOPLASM OF CENTRAL PORTION OF BOTH BREASTS IN FEMALE, ESTROGEN RECEPTOR POSITIVE (HCC): Primary | ICD-10-CM

## 2020-01-09 DIAGNOSIS — C50.112 MALIGNANT NEOPLASM OF CENTRAL PORTION OF BOTH BREASTS IN FEMALE, ESTROGEN RECEPTOR POSITIVE (HCC): Primary | ICD-10-CM

## 2020-01-09 DIAGNOSIS — Z17.0 MALIGNANT NEOPLASM OF CENTRAL PORTION OF BOTH BREASTS IN FEMALE, ESTROGEN RECEPTOR POSITIVE (HCC): Primary | ICD-10-CM

## 2020-01-09 RX ORDER — ANASTROZOLE 1 MG/1
1 TABLET ORAL DAILY
Qty: 30 TABLET | Refills: 5 | Status: SHIPPED | OUTPATIENT
Start: 2020-01-09 | End: 2021-02-04 | Stop reason: SDUPTHER

## 2020-01-17 RX ORDER — ANASTROZOLE 1 MG/1
TABLET ORAL
Qty: 30 TABLET | Refills: 5 | Status: SHIPPED | OUTPATIENT
Start: 2020-01-17 | End: 2021-02-04 | Stop reason: SDUPTHER

## 2020-03-12 ENCOUNTER — CLINICAL SUPPORT (OUTPATIENT)
Dept: CARDIOLOGY | Facility: CLINIC | Age: 75
End: 2020-03-12

## 2020-03-12 DIAGNOSIS — I49.5 SSS (SICK SINUS SYNDROME) (HCC): Primary | ICD-10-CM

## 2020-03-12 PROCEDURE — 93280 PM DEVICE PROGR EVAL DUAL: CPT | Performed by: INTERNAL MEDICINE

## 2020-04-08 ENCOUNTER — OFFICE VISIT (OUTPATIENT)
Dept: CARDIOLOGY | Facility: CLINIC | Age: 75
End: 2020-04-08

## 2020-04-08 DIAGNOSIS — I10 ESSENTIAL HYPERTENSION: ICD-10-CM

## 2020-04-08 DIAGNOSIS — I25.10 ARTERIOSCLEROTIC CARDIOVASCULAR DISEASE (ASCVD): Primary | ICD-10-CM

## 2020-04-08 DIAGNOSIS — I49.5 SSS (SICK SINUS SYNDROME) (HCC): ICD-10-CM

## 2020-04-08 PROCEDURE — 99442 PR PHYS/QHP TELEPHONE EVALUATION 11-20 MIN: CPT | Performed by: INTERNAL MEDICINE

## 2020-04-08 RX ORDER — DIAZEPAM 10 MG/1
10 TABLET ORAL 2 TIMES DAILY PRN
COMMUNITY

## 2020-04-08 NOTE — PROGRESS NOTES
You have chosen to receive care through a telephone visit today. Do you consent to use a telephone visit for your medical care today? Yes     Yasmine Reynolds MD  Sirisha Valdez  1945  04/08/2020    Patient Active Problem List   Diagnosis   •  (ASCVD), s/p stenting in 1999, after an MI, repeat stenting in 2005 of the LAD and RCA at [facility] and stenting of the first OM branch of left circumflex on 6/26/2019.Clinically stable.   • SSS (sick sinus syndrome), s/p permanent pacemaker implantion,    • Palpitations   • Dizziness   • Dyslipidemia, currently on rosuvastatin 40 mg daily   • Essential hypertension, blood pressure is running low associated with fatigue.   • PVD (peripheral vascular disease) (CMS/HCC)   • Swelling of both lower extremities   • Chronic left shoulder pain   • S/P arthroscopy of shoulder   • Coagulopathy (CMS/HCC)   • Bilateral malignant neoplasm of breast in female, estrogen receptor positive (CMS/HCC)   • Tobacco abuse   • Aromatase inhibitor use   • Osteopenia   • Abnormal EKG   • Abnormal nuclear stress test       Dear Yasmine Reynolds MD:    Subjective     Sirisha Valdez is a 74 y.o. female with the problems as listed above, presents    Chief Complaint: Follow-up of coronary artery disease and sick sinus syndrome.     History of Present Illness: Ms. Rae is a pleasant 72-year-old lady with history of known ASCVD, status post previous stenting of the LAD and RCA in 2005 and stenting of the obtuse marginal branch of the left circumflex coronary artery in June 2019 and history of sick sinus syndrome, status post permanent pacemaker implantation with a [employer] medical device and history of hypertension, being followed today for her cardiac problems.  This is a telephonic visit due to ongoing coronavirus pandemic.  On further questioning she denies any complaints of chest pains or shortness of breath, palpitations, dizziness or syncope.  Overall she has been  doing well.  She says she has been taking her medications regularly.  She continues smoke about a pack a day despite repeated advice against it.  She says she has tried nicotine patches which cause rash.  She says her blood pressure remains normal at home.    Allergies   Allergen Reactions   • Promethazine      hypotension   :      Current Outpatient Medications:   •  anastrozole (ARIMIDEX) 1 MG tablet, Take 1 tablet by mouth Daily for 30 days., Disp: 30 tablet, Rfl: 5  •  anastrozole (ARIMIDEX) 1 MG tablet, TAKE 1 TABLET DAILY, Disp: 30 tablet, Rfl: 5  •  anastrozole (ARIMIDEX) 1 MG tablet, Take 1 tablet by mouth Daily., Disp: 30 tablet, Rfl: 5  •  aspirin 81 MG EC tablet, Take 81 mg by mouth daily., Disp: , Rfl:   •  Budesonide (ENTOCORT EC) 3 MG 24 hr capsule, Take 9 mg by mouth Every Morning., Disp: , Rfl:   •  busPIRone (BUSPAR) 30 MG tablet, Take 15 mg by mouth 2 (Two) Times a Day As Needed (anxiety)., Disp: , Rfl:   •  clopidogrel (PLAVIX) 75 MG tablet, Take 1 tablet by mouth Daily., Disp: 90 tablet, Rfl: 3  •  diazePAM (VALIUM) 10 MG tablet, Take 10 mg by mouth 2 (Two) Times a Day As Needed for Anxiety., Disp: , Rfl:   •  HYDROcodone-acetaminophen (NORCO) 7.5-325 MG per tablet, Take 1 tablet by mouth Every 6 (Six) Hours As Needed for Moderate Pain ., Disp: , Rfl:   •  metoprolol tartrate (LOPRESSOR) 25 MG tablet, TAKE 1 TABLET TWICE DAILY, Disp: 60 tablet, Rfl: 2  •  nitroglycerin (NITROSTAT) 0.4 MG SL tablet, Place 0.4 mg under the tongue Every 5 (Five) Minutes As Needed for Chest Pain. Take no more than 3 doses in 15 minutes., Disp: , Rfl:   •  Omega-3 Fatty Acids (FISH OIL) 1000 MG capsule capsule, Take 1,000 mg by mouth Daily With Breakfast., Disp: , Rfl:   •  rosuvastatin (CRESTOR) 40 MG tablet, Take 40 mg by mouth Every Night., Disp: , Rfl: 5      The following portions of the patient's history were reviewed and updated as appropriate: allergies, current medications, past family history, past medical  history, past social history, past surgical history and problem list.    Social History     Tobacco Use   • Smoking status: Current Every Day Smoker     Packs/day: 1.00     Years: 54.00     Pack years: 54.00     Types: Cigarettes     Start date: 1961   • Smokeless tobacco: Never Used   Substance Use Topics   • Alcohol use: No   • Drug use: No       Review of Systems   Cardiovascular: Negative for chest pain, leg swelling, palpitations and syncope.   Respiratory: Negative for shortness of breath.    Neurological: Negative for dizziness.       Objective   There were no vitals filed for this visit.  There is no height or weight on file to calculate BMI.        Physical Exam: Physical examination could not be done as this is a telephone visit.    Lab Results   Component Value Date     06/27/2019    K 4.0 06/27/2019     06/27/2019    CO2 24.4 06/27/2019    BUN 8 06/27/2019    CREATININE 0.77 06/27/2019    GLUCOSE 109 (H) 06/27/2019    CALCIUM 9.3 06/27/2019    AST 12 06/24/2019    ALT 10 06/24/2019    ALKPHOS 65 06/24/2019    LABIL2 1.4 (L) 01/04/2016         Assessment/Plan :   Diagnosis Plan   1.  (ASCVD), s/p stenting in 1999, after an MI, repeat stenting in 2005 of the LAD and RCA at [facility] and stenting of the first OM branch of left circumflex on 6/26/2019.Clinically stable.     2. SSS (sick sinus syndrome), s/p permanent pacemaker implantion,      3. Essential hypertension, controlled.          Recommendations:  1. Continue with aspirin, Plavix, metoprolol, rosuvastatin at current doses.  2. I have reemphasized for her to quit smoking.  She said she is willing to try Chantix if it is affordable.  We will have the office staff to check on the cost of this medication for her and prescribe it if affordable.   3. I have asked her if she needs any refills on her medication and she said she is okay for now and will call us if needed.  4. I reminded her to stay home and stay safe and call if any  questions.    Return in about 5 months (around 9/8/2020).    As always, Yasmine Reynolds MD  I appreciate very much the opportunity to participate in the cardiovascular care of your patients. Please do not hesitate to call me with any questions with regards to Sirisha Valdez's evaluation and management.         With Best Regards,        Salvador Mccarthy MD, [facility]    This visit has been rescheduled as a phone visit to comply with patient safety concerns in accordance with CDC recommendations. Total time of discussion was 14 minutes.      Please note that portions of this note were completed with a voice recognition program.

## 2020-04-14 ENCOUNTER — TELEPHONE (OUTPATIENT)
Dept: CARDIOLOGY | Facility: CLINIC | Age: 75
End: 2020-04-14

## 2020-04-14 DIAGNOSIS — Z72.0 TOBACCO ABUSE: Primary | ICD-10-CM

## 2020-04-14 NOTE — TELEPHONE ENCOUNTER
I sent it in for her. I sent in the starting pack. When this pack is complete let us know and We can send in the continuing pack. Thanks.

## 2020-05-21 ENCOUNTER — HOSPITAL ENCOUNTER (OUTPATIENT)
Dept: ULTRASOUND IMAGING | Facility: HOSPITAL | Age: 75
Discharge: HOME | End: 2020-05-21

## 2020-05-21 ENCOUNTER — HOSPITAL ENCOUNTER (OUTPATIENT)
Dept: MAMMOGRAPHY | Facility: HOSPITAL | Age: 75
Discharge: HOME | End: 2020-05-21
Admitting: RADIOLOGY

## 2020-05-21 DIAGNOSIS — Z09 FOLLOW-UP EXAM, 3-6 MONTHS SINCE PREVIOUS EXAM: ICD-10-CM

## 2020-05-21 DIAGNOSIS — N64.4 SORENESS BREAST: ICD-10-CM

## 2020-05-21 PROCEDURE — 76642 ULTRASOUND BREAST LIMITED: CPT

## 2020-05-21 PROCEDURE — 77066 DX MAMMO INCL CAD BI: CPT | Performed by: RADIOLOGY

## 2020-05-21 PROCEDURE — 77066 DX MAMMO INCL CAD BI: CPT

## 2020-05-21 PROCEDURE — G0279 TOMOSYNTHESIS, MAMMO: HCPCS | Performed by: RADIOLOGY

## 2020-05-21 PROCEDURE — G0279 TOMOSYNTHESIS, MAMMO: HCPCS

## 2020-06-01 ENCOUNTER — OFFICE VISIT (OUTPATIENT)
Dept: SURGERY | Facility: CLINIC | Age: 75
End: 2020-06-01

## 2020-06-01 VITALS
SYSTOLIC BLOOD PRESSURE: 111 MMHG | HEART RATE: 64 BPM | DIASTOLIC BLOOD PRESSURE: 42 MMHG | HEIGHT: 63 IN | WEIGHT: 116.4 LBS | BODY MASS INDEX: 20.62 KG/M2

## 2020-06-01 DIAGNOSIS — C50.111 MALIGNANT NEOPLASM OF CENTRAL PORTION OF BOTH BREASTS IN FEMALE, ESTROGEN RECEPTOR POSITIVE (HCC): Primary | ICD-10-CM

## 2020-06-01 DIAGNOSIS — M85.80 OSTEOPENIA, UNSPECIFIED LOCATION: ICD-10-CM

## 2020-06-01 DIAGNOSIS — Z78.0 POST-MENOPAUSE: Primary | ICD-10-CM

## 2020-06-01 DIAGNOSIS — C50.112 MALIGNANT NEOPLASM OF CENTRAL PORTION OF BOTH BREASTS IN FEMALE, ESTROGEN RECEPTOR POSITIVE (HCC): Primary | ICD-10-CM

## 2020-06-01 DIAGNOSIS — Z17.0 MALIGNANT NEOPLASM OF CENTRAL PORTION OF BOTH BREASTS IN FEMALE, ESTROGEN RECEPTOR POSITIVE (HCC): Primary | ICD-10-CM

## 2020-06-01 DIAGNOSIS — R19.7 DIARRHEA, UNSPECIFIED TYPE: ICD-10-CM

## 2020-06-01 DIAGNOSIS — Z79.811 AROMATASE INHIBITOR USE: ICD-10-CM

## 2020-06-01 PROCEDURE — 99214 OFFICE O/P EST MOD 30 MIN: CPT | Performed by: SURGERY

## 2020-06-01 NOTE — PROGRESS NOTES
"Tom Valdez is a 74 y.o. female is here today for follow-up for breast care and mammogram review.    History of Present Illness  Ms. Valdez was seen in the office today for breast cancer follow-up.  She is status post a left breast lumpectomy and sentinel lymph node biopsy, and a right breast lumpectomy on 12/12/17.  Final pathology demonstrated left T1bN0 infiltrating ductal carcinoma, ER positive, MN positive, HER-2 negative.  The anterior margin was positive for invasive tumor and other margins were positive for DCIS.  The right lumpectomy demonstrated DCIS, ER positive, MN positive with close but clear margins.  The patient underwent margin reexcision of the left and the right breast on 12/27/17.  The right lumpectomy showed no residual disease and the left side demonstrated a very small focus of DCIS.   Due to the patient's age and clear margin status the decision was made to forego radiation.  The patient was started on anastrozole in January, 2018.  She states she is tolerating it well with no side effects.  The patient has no complaints referable to the breast other than occasional left lateral breast pain.  She denies any arm swelling or axillary adenopathy.  She denies any new bone or joint pain or other symptoms of metastatic disease.  Josseline had a bilateral mammogram with tomosynthesis and a left breast ultrasound due to complaints of left breast pain on 5/21/2020 which demonstrated presumed postoperative changes for which a six-month follow-up was recommended.  The patient had a bone density study done about 2 months ago by her primary care provider Dr. Reynolds.  Patient states that the result was \"pretty good\" she is on vitamin D.  The patient's other complaint is of diarrhea.  She states this is been going on for about 2 years.  She reports loose stools 5 or 6 times a day.  She states she has had a fairly recent colonoscopy but she does not know how long ago this was.  She is not sure " who the provider was.  She states she also saw Dr. Frausto in [place] who gave her some pills.  She states that Imodium does help but it can also be very constipating.  She states her primary care provider has diagnosed her with collagenous colitis.    Allergies   Allergen Reactions   • Promethazine      hypotension         Current Outpatient Medications   Medication Sig Dispense Refill   • anastrozole (ARIMIDEX) 1 MG tablet Take 1 tablet by mouth Daily for 30 days. 30 tablet 5   • aspirin 81 MG EC tablet Take 81 mg by mouth daily.     • Budesonide (ENTOCORT EC) 3 MG 24 hr capsule Take 9 mg by mouth Every Morning.     • busPIRone (BUSPAR) 30 MG tablet Take 15 mg by mouth 2 (Two) Times a Day As Needed (anxiety).     • clopidogrel (PLAVIX) 75 MG tablet Take 1 tablet by mouth Daily. 90 tablet 3   • diazePAM (VALIUM) 10 MG tablet Take 10 mg by mouth 2 (Two) Times a Day As Needed for Anxiety.     • HYDROcodone-acetaminophen (NORCO) 7.5-325 MG per tablet Take 1 tablet by mouth Every 6 (Six) Hours As Needed for Moderate Pain .     • metoprolol tartrate (LOPRESSOR) 25 MG tablet TAKE 1 TABLET TWICE DAILY 60 tablet 2   • nitroglycerin (NITROSTAT) 0.4 MG SL tablet Place 0.4 mg under the tongue Every 5 (Five) Minutes As Needed for Chest Pain. Take no more than 3 doses in 15 minutes.     • Omega-3 Fatty Acids (FISH OIL) 1000 MG capsule capsule Take 1,000 mg by mouth Daily With Breakfast.     • rosuvastatin (CRESTOR) 40 MG tablet Take 40 mg by mouth Every Night.  5   • varenicline (Chantix Starting Month Pak) 0.5 MG X 11 & 1 MG X 42 tablet Take 0.5 mg one daily on days 1-3 and and 0.5 mg twice daily on days 4-7.Then 1 mg twice daily for a total of 12 weeks. 53 tablet 0   • anastrozole (ARIMIDEX) 1 MG tablet TAKE 1 TABLET DAILY 30 tablet 5   • anastrozole (ARIMIDEX) 1 MG tablet Take 1 tablet by mouth Daily. 30 tablet 5     No current facility-administered medications for this visit.      Past Medical History:   Diagnosis Date   •  Arthritis    • Breast cancer (CMS/HCC) 11/28/2017    bilateral   • Coronary artery disease     3 HEART STENTS   • Gastroesophageal reflux    • Glaucoma    • Hypertension    • MI (myocardial infarction) (CMS/HCC)    • Migraine    • Peripheral arterial disease (CMS/HCC)     KIM LEG STENTS       ANDFEM POP   • Peripheral vascular disease (CMS/HCC)    • SSS (sick sinus syndrome) (CMS/HCC)    • Status post placement of cardiac pacemaker    • Wears dentures    • Wears glasses      Past Surgical History:   Procedure Laterality Date   • BREAST LUMPECTOMY Right 12/12/2017    Procedure: BREAST LUMPECTOMY WITH NEEDLE LOCALIZATION;  Surgeon: Lida Babin MD;  Location: [facility];  Service:    • BREAST LUMPECTOMY Bilateral 12/27/2017    Procedure: right breast re-excision  left breast re-excision with 3 cm inferior and superior advancement flap;  Surgeon: Lida Babin MD;  Location: [facility];  Service:    • BREAST LUMPECTOMY WITH SENTINEL NODE BIOPSY AND AXILLARY NODE DISSECTION Left 12/12/2017    Procedure: Left BREAST LUMPECTOMY WITH SENTINEL NODE BIOPSY  Isotope and Lymphazurin injection left breast. Left breast preoperative needle localization;  Surgeon: Lida Babin MD;  Location: [facility];  Service:    • BREAST SURGERY      Mass Removal.   • CARDIAC CATHETERIZATION     • CARDIAC CATHETERIZATION N/A 6/26/2019    Procedure: Left Heart Cath;  Surgeon: Salvador Mccarthy MD;  Location: [facility] INVASIVE LOCATION;  Service: Cardiology   • CARPAL TUNNEL RELEASE     • CATARACT EXTRACTION     • FEMORAL DISTAL BYPASS     • HIP SURGERY     • HYSTERECTOMY     • PACEMAKER IMPLANTATION     • MN RT/LT HEART CATHETERS N/A 6/26/2019    Procedure: Percutaneous Coronary Intervention;  Surgeon: Gus Suero MD;  Location: [facility] CATH INVASIVE LOCATION;  Service: Cardiology   • SHOULDER ARTHROSCOPY W/ ROTATOR CUFF REPAIR Left 3/8/2017    Procedure: LEFT SHOULDER ARTHROSCOPY, ACROMIOPLASTY, SUBACROMIC DEBRIDMENT;  Surgeon:  "Tesfaye Polo MD;  Location: [facility];  Service:    • SHOULDER SURGERY         The following portions of the patient's history were reviewed and updated as appropriate: allergies, current medications, past family history, past medical history, past social history, past surgical history and problem list.    Review of Systems  General: negative  Integumentary: negative  Eyes: glasses, eyes itch  ENT: negative  Respiratory: negative  Gastrointestinal: loss of appetite and diarrhea  Cardiovascular: negative  Neurological: negative  Psychiatric: negative  Hematologic/Lymphatic: easy bleeding and easy bruising  Genitourinary: negative  Musculoskeletal: joint swelling  Endocrine: negative  Breasts: negative    Objective   /42 (BP Location: Left arm)   Pulse 64   Ht 160 cm (63\")   Wt 52.8 kg (116 lb 6.4 oz)   BMI 20.62 kg/m²    Physical Exam was performed on 6/1/2020  General:  This is a WD WN female in no acute distress  Vital signs stable, afebrile  HEENT exam:  WNL. Sclera are anicteric.  EOMI  Neck:  supple, FROM.  No JVD.  Trachea midline.  No palpable thyroid nodules. No supraclavicular adenopathy  Lungs:  Respiratory effort normal. Auscultation: Clear, without wheezes, rhonchi, rales  Heart:  Regular rate and rhythm, without murmur, gallop, rub.  No pedal edema  Breasts: On visual inspection there is some mild volume loss on the left..  Examination of the right breast demonstrates a well-healed circumareolar scar, without discrete mass, skin change, axillary adenopathy.  Examination of the left breast demonstrates a well-healed circumareolar scar, without discrete mass, skin change, axillary adenopathy.  Abdomen: Nontender, without hepatosplenomegaly  Musculoskeletal:  muscle strength/tone is normal.    Psyc:  alert, oriented x 3.  Mood and affect are appropriate  skin:  Warm with good turgor.  Extensive bruising secondary to Plavix  extremities:  Examination of the extremities revealed no " cyanosis, clubbing or edema.    Results/Data  Mammogram reports and images were reviewed and I agree with the assessment    Procedures     Assessment/Plan   Low grade DCIS of the right breast.  Grade 1 infiltrating ductal carcinoma of the left breast T1b N0 MX, ER positive, VA positive  Both sites with clear margins on reexcision  Osteopenia  Diarrhea     Plan:  Continue anastrozole  Continue calcium and vitamin D  Bilateral mammogram in 6 months with return to the office following  We will obtain copy of recent bone density  We will obtain copies of records related to patient's diarrhea.  I have asked her to try fiber supplement and will follow up with her in a month regarding this.         Discussion/Summary    Patient's Body mass index is 20.62 kg/m². BMI is within normal parameters. No follow-up required..         Future Appointments   Date Time Provider Department Center   10/13/2020 11:30 AM Salavdor Mccarthy MD [facility] None         Please note that portions of this note were completed with a voice recognition program.

## 2020-06-30 ENCOUNTER — TELEPHONE (OUTPATIENT)
Dept: SURGERY | Facility: CLINIC | Age: 75
End: 2020-06-30

## 2020-06-30 NOTE — TELEPHONE ENCOUNTER
I called the patient and ask if diarrhea had improved. She said it had not. I ask if she was taking the fiber and she said she was. I went ahead and made an appointment for the next opening in [place].

## 2020-07-06 ENCOUNTER — OFFICE VISIT (OUTPATIENT)
Dept: SURGERY | Facility: CLINIC | Age: 75
End: 2020-07-06

## 2020-07-06 VITALS
DIASTOLIC BLOOD PRESSURE: 54 MMHG | HEART RATE: 69 BPM | WEIGHT: 114.4 LBS | HEIGHT: 63 IN | SYSTOLIC BLOOD PRESSURE: 108 MMHG | BODY MASS INDEX: 20.27 KG/M2

## 2020-07-06 DIAGNOSIS — K52.831 COLLAGENOUS COLITIS: Primary | ICD-10-CM

## 2020-07-06 PROCEDURE — 99212 OFFICE O/P EST SF 10 MIN: CPT | Performed by: SURGERY

## 2020-07-06 RX ORDER — POTASSIUM CHLORIDE 750 MG/1
TABLET, FILM COATED, EXTENDED RELEASE ORAL
COMMUNITY
Start: 2020-06-02 | End: 2021-02-04 | Stop reason: SDUPTHER

## 2020-07-06 RX ORDER — LEVOTHYROXINE SODIUM 0.03 MG/1
TABLET ORAL
COMMUNITY
Start: 2020-06-02

## 2020-07-06 NOTE — PROGRESS NOTES
Subjective   Sirisha Valdez is a 74 y.o. female is here today for follow-up for diarrhea.    History of Present Illness  Ms. Valdez was seen in the office today for follow-up of her collagenous colitis.  At the time of her last breast cancer follow-up on 6/1/2020 the patient mentioned this and asked if I have any suggestions for treatment.  The patient was diagnosed with this on colonoscopy in 2018 and so far treatments have failed but the diarrhea that the patient is having has been affecting her quality of life.  She states that Dr. Frausto in [place] gave her some pills but these were not effective.  I asked the patient to try a fiber side of the limit and return for follow-up.  She presents today stating that she is no better.  Allergies   Allergen Reactions   • Promethazine      hypotension         Current Outpatient Medications   Medication Sig Dispense Refill   • anastrozole (ARIMIDEX) 1 MG tablet Take 1 tablet by mouth Daily for 30 days. 30 tablet 5   • anastrozole (ARIMIDEX) 1 MG tablet TAKE 1 TABLET DAILY 30 tablet 5   • anastrozole (ARIMIDEX) 1 MG tablet Take 1 tablet by mouth Daily. 30 tablet 5   • aspirin 81 MG EC tablet Take 81 mg by mouth daily.     • Budesonide (ENTOCORT EC) 3 MG 24 hr capsule Take 9 mg by mouth Every Morning.     • busPIRone (BUSPAR) 30 MG tablet Take 15 mg by mouth 2 (Two) Times a Day As Needed (anxiety).     • clopidogrel (PLAVIX) 75 MG tablet Take 1 tablet by mouth Daily. 90 tablet 3   • diazePAM (VALIUM) 10 MG tablet Take 10 mg by mouth 2 (Two) Times a Day As Needed for Anxiety.     • HYDROcodone-acetaminophen (NORCO) 7.5-325 MG per tablet Take 1 tablet by mouth Every 6 (Six) Hours As Needed for Moderate Pain .     • levothyroxine (SYNTHROID, LEVOTHROID) 25 MCG tablet      • metoprolol tartrate (LOPRESSOR) 25 MG tablet TAKE 1 TABLET TWICE DAILY 60 tablet 2   • nitroglycerin (NITROSTAT) 0.4 MG SL tablet Place 0.4 mg under the tongue Every 5 (Five) Minutes As Needed for Chest  Pain. Take no more than 3 doses in 15 minutes.     • Omega-3 Fatty Acids (FISH OIL) 1000 MG capsule capsule Take 1,000 mg by mouth Daily With Breakfast.     • potassium chloride (K-DUR) 10 MEQ CR tablet      • rosuvastatin (CRESTOR) 40 MG tablet Take 40 mg by mouth Every Night.  5   • varenicline (Chantix Starting Month Pak) 0.5 MG X 11 & 1 MG X 42 tablet Take 0.5 mg one daily on days 1-3 and and 0.5 mg twice daily on days 4-7.Then 1 mg twice daily for a total of 12 weeks. 53 tablet 0     No current facility-administered medications for this visit.      Past Medical History:   Diagnosis Date   • Arthritis    • Breast cancer (CMS/HCC) 11/28/2017    bilateral   • Coronary artery disease     3 HEART STENTS   • Gastroesophageal reflux    • Glaucoma    • Hypertension    • MI (myocardial infarction) (CMS/HCC)    • Migraine    • Peripheral arterial disease (CMS/HCC)     KIM LEG STENTS       ANDFEM POP   • Peripheral vascular disease (CMS/HCC)    • SSS (sick sinus syndrome) (CMS/HCC)    • Status post placement of cardiac pacemaker    • Wears dentures    • Wears glasses      Past Surgical History:   Procedure Laterality Date   • BREAST LUMPECTOMY Right 12/12/2017    Procedure: BREAST LUMPECTOMY WITH NEEDLE LOCALIZATION;  Surgeon: Lida Babin MD;  Location: [facility] OR;  Service:    • BREAST LUMPECTOMY Bilateral 12/27/2017    Procedure: right breast re-excision  left breast re-excision with 3 cm inferior and superior advancement flap;  Surgeon: Lida Babin MD;  Location: [facility] OR;  Service:    • BREAST LUMPECTOMY WITH SENTINEL NODE BIOPSY AND AXILLARY NODE DISSECTION Left 12/12/2017    Procedure: Left BREAST LUMPECTOMY WITH SENTINEL NODE BIOPSY  Isotope and Lymphazurin injection left breast. Left breast preoperative needle localization;  Surgeon: Lida Babin MD;  Location: [facility] OR;  Service:    • BREAST SURGERY      Mass Removal.   • CARDIAC CATHETERIZATION     • CARDIAC CATHETERIZATION N/A 6/26/2019     "Procedure: Left Heart Cath;  Surgeon: Salvador Mccarthy MD;  Location:  [facility] CATH INVASIVE LOCATION;  Service: Cardiology   • CARPAL TUNNEL RELEASE     • CATARACT EXTRACTION     • FEMORAL DISTAL BYPASS     • HIP SURGERY     • HYSTERECTOMY     • PACEMAKER IMPLANTATION     • MT RT/LT HEART CATHETERS N/A 6/26/2019    Procedure: Percutaneous Coronary Intervention;  Surgeon: Gus Suero MD;  Location:  [facility] CATH INVASIVE LOCATION;  Service: Cardiology   • SHOULDER ARTHROSCOPY W/ ROTATOR CUFF REPAIR Left 3/8/2017    Procedure: LEFT SHOULDER ARTHROSCOPY, ACROMIOPLASTY, SUBACROMIC DEBRIDMENT;  Surgeon: Tesfaye Polo MD;  Location: [facility] OR;  Service:    • SHOULDER SURGERY         The following portions of the patient's history were reviewed and updated as appropriate: allergies, current medications, past family history, past medical history, past social history, past surgical history and problem list.    Review of Systems  General: negative  Integumentary: negative  Eyes: eyes itch  ENT: negative  Respiratory: negative  Gastrointestinal: diarrhea  Cardiovascular: negative  Neurological: negative  Psychiatric: negative  Hematologic/Lymphatic: easy bleeding and easy bruising  Genitourinary: negative  Musculoskeletal: negative  Endocrine: history of thyroid problem  Breasts: negative    Objective   Ht 160 cm (63\")   Wt 51.9 kg (114 lb 6.4 oz)   BMI 20.27 kg/m²    Physical Exam    Results/Data  Records from [facility] including the colonoscopy report and pathology report which did make the diagnosis of collagenous colitis were reviewed.    Procedures     Assessment/Plan   Collagenous colitis.    I discussed the patient's case with 1 of the GI doctors who does treat this problem.  He recommended some different medications to try.  He stated that budesonide at 9 mg a day was the medication that had the most likelihood of producing a long-term remission.  After this and speaking with the patient and " informing her of this information the prescription was called into her and she is aware of the co-pay.  She will follow-up in 1 month's time and see if there is any benefit.  If not I will schedule her a follow-up appointment with Dr. Villasenor's in [place].  Patient was placed in recall for breast cancer follow-up.       Discussion/Summary    Patient's Body mass index is 20.27 kg/m². BMI is within normal parameters. No follow-up required..         Future Appointments   Date Time Provider Department Center   10/13/2020 11:30 AM Salvador Mccarthy MD [facility] None         Please note that portions of this note were completed with a voice recognition program.

## 2020-07-07 ENCOUNTER — TELEPHONE (OUTPATIENT)
Dept: SURGERY | Facility: CLINIC | Age: 75
End: 2020-07-07

## 2020-07-07 NOTE — TELEPHONE ENCOUNTER
Dr. Babin had tried to send Entocort EC 3mg TID for 30 days but the computer would not allow because of insurance. I called and ask if I could call it in and them run it while on the phone and they said yes. They said she'd have a co-pay of $3.60. I told them to go ahead  And fill it.

## 2020-07-08 PROBLEM — K52.831 COLLAGENOUS COLITIS: Status: ACTIVE | Noted: 2020-07-08

## 2020-10-13 ENCOUNTER — OFFICE VISIT (OUTPATIENT)
Dept: CARDIOLOGY | Facility: CLINIC | Age: 75
End: 2020-10-13

## 2020-10-13 VITALS — WEIGHT: 114.8 LBS | BODY MASS INDEX: 20.34 KG/M2 | HEIGHT: 63 IN

## 2020-10-13 DIAGNOSIS — I25.10 ARTERIOSCLEROTIC CARDIOVASCULAR DISEASE (ASCVD): Primary | ICD-10-CM

## 2020-10-13 DIAGNOSIS — I49.5 SSS (SICK SINUS SYNDROME) (HCC): ICD-10-CM

## 2020-10-13 PROCEDURE — 99442 PR PHYS/QHP TELEPHONE EVALUATION 11-20 MIN: CPT | Performed by: INTERNAL MEDICINE

## 2020-10-13 NOTE — PROGRESS NOTES
You have chosen to receive care through a telephone visit. Do you consent to use a telephone visit for your medical care today. YES.  Yasmine Reynolds MD Bechristina STEVENSON Valdez  1945  10/13/2020    Patient Active Problem List   Diagnosis   •  (ASCVD), s/p stenting in 1999, after an MI, repeat stenting in 2005 of the LAD and RCA at [facility] and stenting of the first OM branch of left circumflex on 6/26/2019.Clinically stable.   • SSS (sick sinus syndrome), s/p permanent pacemaker implantion,    • Palpitations   • Dizziness   • Dyslipidemia, currently on rosuvastatin 40 mg daily   • Essential hypertension, blood pressure is running low associated with fatigue.   • PVD (peripheral vascular disease) (CMS/HCC)   • Swelling of both lower extremities   • Chronic left shoulder pain   • S/P arthroscopy of shoulder   • Coagulopathy (CMS/HCC)   • Bilateral malignant neoplasm of breast in female, estrogen receptor positive (CMS/HCC)   • Tobacco abuse   • Aromatase inhibitor use   • Osteopenia   • Abnormal EKG   • Abnormal nuclear stress test   • Collagenous colitis       Dear Yasmine Reynolds MD:    Subjective     Sirisha Valdez is a 74 y.o. female with the problems as listed above, presents    Chief Complaint: Follow-up of coronary artery disease and sick sinus syndrome.     History of Present Illness: Ms. Mendoza is a pleasant 72-year-old  female with history of known coronary artery disease, status post stenting in 1999 and in 2005 involving LAD and RCA and first obtuse marginal branch of the left circumflex coronary artery in June 2019 and history of sick sinus syndrome, status post permanent pacemaker implantation, is being followed today is regular cardiology follow-up with a transtelephonic visit due to ongoing COVID-19 pandemic.  On further questioning Ms. Valdez denies any complaints of chest pains or shortness of breath and states she has been doing fine since the last visit in April 2020.   She states her blood pressure has been doing well at home.  She continues to smoke although she states she has been trying to quit.  She says she has tried Chantix and had to quit due to lot of mood changes.  She wants to quit smoking on her own.    Allergies   Allergen Reactions   • Promethazine      hypotension   :    Current Outpatient Medications:   •  anastrozole (ARIMIDEX) 1 MG tablet, Take 1 tablet by mouth Daily for 30 days., Disp: 30 tablet, Rfl: 5  •  anastrozole (ARIMIDEX) 1 MG tablet, TAKE 1 TABLET DAILY, Disp: 30 tablet, Rfl: 5  •  anastrozole (ARIMIDEX) 1 MG tablet, Take 1 tablet by mouth Daily., Disp: 30 tablet, Rfl: 5  •  aspirin 81 MG EC tablet, Take 81 mg by mouth daily., Disp: , Rfl:   •  busPIRone (BUSPAR) 30 MG tablet, Take 15 mg by mouth 2 (Two) Times a Day As Needed (anxiety)., Disp: , Rfl:   •  clopidogrel (PLAVIX) 75 MG tablet, Take 1 tablet by mouth Daily., Disp: 90 tablet, Rfl: 3  •  diazePAM (VALIUM) 10 MG tablet, Take 10 mg by mouth 2 (Two) Times a Day As Needed for Anxiety., Disp: , Rfl:   •  HYDROcodone-acetaminophen (NORCO) 7.5-325 MG per tablet, Take 1 tablet by mouth Every 6 (Six) Hours As Needed for Moderate Pain ., Disp: , Rfl:   •  levothyroxine (SYNTHROID, LEVOTHROID) 25 MCG tablet, , Disp: , Rfl:   •  Multiple Vitamins-Minerals (MULTIVITAL PO), Take  by mouth., Disp: , Rfl:   •  Omega-3 Fatty Acids (FISH OIL) 1000 MG capsule capsule, Take 1,000 mg by mouth Daily With Breakfast., Disp: , Rfl:   •  potassium chloride (K-DUR) 10 MEQ CR tablet, , Disp: , Rfl:   •  rosuvastatin (CRESTOR) 40 MG tablet, Take 40 mg by mouth Every Night., Disp: , Rfl: 5  •  Budesonide (ENTOCORT EC) 3 MG 24 hr capsule, Take 9 mg by mouth Every Morning., Disp: , Rfl:   •  metoprolol tartrate (LOPRESSOR) 25 MG tablet, Take 1 tablet by mouth 2 (Two) Times a Day., Disp: 60 tablet, Rfl: 5  •  nitroglycerin (NITROSTAT) 0.4 MG SL tablet, Place 0.4 mg under the tongue Every 5 (Five) Minutes As Needed for Chest  "Pain. Take no more than 3 doses in 15 minutes., Disp: , Rfl:   •  varenicline (Chantix Starting Month Pak) 0.5 MG X 11 & 1 MG X 42 tablet, Take 0.5 mg one daily on days 1-3 and and 0.5 mg twice daily on days 4-7.Then 1 mg twice daily for a total of 12 weeks., Disp: 53 tablet, Rfl: 0      The following portions of the patient's history were reviewed and updated as appropriate: allergies, current medications, past family history, past medical history, past social history, past surgical history and problem list.    Social History     Tobacco Use   • Smoking status: Current Every Day Smoker     Packs/day: 1.00     Years: 54.00     Pack years: 54.00     Types: Cigarettes     Start date: 1961   • Smokeless tobacco: Never Used   Substance Use Topics   • Alcohol use: No   • Drug use: No       Review of Systems   Constitution: Negative for chills and fever.   HENT: Negative for nosebleeds and sore throat.    Respiratory: Negative for cough, hemoptysis and wheezing.    Gastrointestinal: Negative for abdominal pain, hematemesis, hematochezia, melena, nausea and vomiting.   Genitourinary: Negative for dysuria and hematuria.   Neurological: Negative for headaches.       Objective   Vitals:    10/13/20 1626   Weight: 52.1 kg (114 lb 12.8 oz)   Height: 160 cm (63\")     Body mass index is 20.34 kg/m².    Physical Exam: Patient appears to be alert and oriented and was in no apparent distress during conversation on the telephone.    Assessment/Plan  :   Diagnosis Plan   1.  (ASCVD), s/p stenting in 1999, after an MI, repeat stenting in 2005 of the LAD and RCA at [facility] and stenting of the first OM branch of left circumflex on 6/26/2019.Clinically stable.     2. SSS (sick sinus syndrome), s/p permanent pacemaker implantion, clinically stable.      Recommendations:  1. Continue with aspirin, Plavix, rosuvastatin and metoprolol at current doses.  2. Patient asked for refill only on metoprolol which I did send it to her " pharmacy in [place].  3. I have reemphasized for her to quit smoking.    Return in about 6 months (around 4/13/2021).    As always, Dr. Reynolds  I appreciate very much the opportunity to participate in the cardiovascular care of your patients. Please do not hesitate to call me with any questions with regards to Sirisha Valdez evaluation and management.     This visit has been rescheduled as a phone visit to comply with patient safety concerns in accordance with CDC recommendations. Total time of discussion was14 minutes.      With Best Regards,        Salvador Mccarthy MD, [facility]    Please note that portions of this note were completed with a voice recognition program.

## 2020-11-18 ENCOUNTER — APPOINTMENT (OUTPATIENT)
Dept: MAMMOGRAPHY | Facility: HOSPITAL | Age: 75
End: 2020-11-18

## 2020-11-22 RX ORDER — CLOPIDOGREL BISULFATE 75 MG/1
75 TABLET ORAL DAILY
Qty: 90 TABLET | Refills: 3 | Status: SHIPPED | OUTPATIENT
Start: 2020-11-22 | End: 2020-12-16 | Stop reason: HOSPADM

## 2020-12-02 ENCOUNTER — HOSPITAL ENCOUNTER (OUTPATIENT)
Dept: MAMMOGRAPHY | Facility: HOSPITAL | Age: 75
Discharge: HOME | End: 2020-12-02
Admitting: RADIOLOGY

## 2020-12-02 DIAGNOSIS — R92.8 ABNORMAL MAMMOGRAM: ICD-10-CM

## 2020-12-02 PROCEDURE — G0279 TOMOSYNTHESIS, MAMMO: HCPCS

## 2020-12-02 PROCEDURE — G0279 TOMOSYNTHESIS, MAMMO: HCPCS | Performed by: RADIOLOGY

## 2020-12-02 PROCEDURE — 77066 DX MAMMO INCL CAD BI: CPT | Performed by: RADIOLOGY

## 2020-12-02 PROCEDURE — 77066 DX MAMMO INCL CAD BI: CPT

## 2020-12-07 ENCOUNTER — OFFICE VISIT (OUTPATIENT)
Dept: SURGERY | Facility: CLINIC | Age: 75
End: 2020-12-07

## 2020-12-07 VITALS — HEIGHT: 63 IN | BODY MASS INDEX: 19.7 KG/M2 | WEIGHT: 111.2 LBS

## 2020-12-07 DIAGNOSIS — C50.111 MALIGNANT NEOPLASM OF CENTRAL PORTION OF BOTH BREASTS IN FEMALE, ESTROGEN RECEPTOR POSITIVE (HCC): Primary | ICD-10-CM

## 2020-12-07 DIAGNOSIS — K52.831 COLITIS, COLLAGENOUS: ICD-10-CM

## 2020-12-07 DIAGNOSIS — C50.112 MALIGNANT NEOPLASM OF CENTRAL PORTION OF BOTH BREASTS IN FEMALE, ESTROGEN RECEPTOR POSITIVE (HCC): Primary | ICD-10-CM

## 2020-12-07 DIAGNOSIS — Z17.0 MALIGNANT NEOPLASM OF CENTRAL PORTION OF BOTH BREASTS IN FEMALE, ESTROGEN RECEPTOR POSITIVE (HCC): Primary | ICD-10-CM

## 2020-12-07 DIAGNOSIS — Z79.811 AROMATASE INHIBITOR USE: ICD-10-CM

## 2020-12-07 DIAGNOSIS — M85.80 OSTEOPENIA, UNSPECIFIED LOCATION: ICD-10-CM

## 2020-12-07 PROCEDURE — 99214 OFFICE O/P EST MOD 30 MIN: CPT | Performed by: SURGERY

## 2020-12-07 PROCEDURE — 93702 BIS XTRACELL FLUID ANALYSIS: CPT | Performed by: SURGERY

## 2020-12-07 RX ORDER — BUDESONIDE 3 MG/1
9 CAPSULE, COATED PELLETS ORAL EVERY MORNING
Qty: 90 CAPSULE | Refills: 0 | Status: SHIPPED | OUTPATIENT
Start: 2020-12-07 | End: 2021-01-06

## 2020-12-07 RX ORDER — ANASTROZOLE 1 MG/1
1 TABLET ORAL DAILY
Qty: 30 TABLET | Refills: 11 | Status: SHIPPED | OUTPATIENT
Start: 2020-12-07 | End: 2022-08-23

## 2020-12-07 NOTE — PROGRESS NOTES
Subjective   Sirisha Valdez is a 75 y.o. female here today for breast care and mammogram follow up.    History of Present Illness  Ms. Valdez was seen in the office today for breast cancer follow-up.  She is status post a left breast lumpectomy and sentinel lymph node biopsy, and a right breast lumpectomy on 12/12/17.  Final pathology demonstrated left T1bN0 infiltrating ductal carcinoma, ER positive, NC positive, HER-2 negative.  The anterior margin was positive for invasive tumor and other margins were positive for DCIS.  The right lumpectomy demonstrated DCIS, ER positive, NC positive with close but clear margins.  The patient underwent margin reexcision of the left and the right breast on 12/27/17.  The right lumpectomy showed no residual disease and the left side demonstrated a very small focus of DCIS.   Due to the patient's age and clear margin status the decision was made to forego radiation.  The patient was started on anastrozole in January, 2018.  She states she is tolerating it well with no side effects.  The patient has no complaints referable to the breast other than occasional left lateral breast pain.  She denies any arm swelling or axillary adenopathy.  She denies any new bone or joint pain or other symptoms of metastatic disease.  Josseline had a bilateral mammogram with tomosynthesis on 12/2/2020 which demonstrated presumed postoperative changes for which a si12-month follow-up was recommended.  The patient had a bone density study done on 2 /20/20 ago by her primary care provider Dr. Reynolds which was normal.  The patient's other complaint is of diarrhea.  She states this is been going on for about 2 years.  She actually had a separate visit for this in July, 2020.  I had gotten a copy of her colonoscopy report which did confirm collagenous colitis.  I spoke with gastroenterologist Dr. Villasenor's at [facility] who had recommended patient have a trial of budesonide.  This was discussed with the  patient and the prescription was called in for her.  The patient reports no recollection of that conversation and states that she never filled the prescription.  She states she still is having of the diarrhea.  Allergies   Allergen Reactions   • Promethazine      hypotension         Current Outpatient Medications   Medication Sig Dispense Refill   • anastrozole (ARIMIDEX) 1 MG tablet Take 1 tablet by mouth Daily for 30 days. 30 tablet 5   • aspirin 81 MG EC tablet Take 81 mg by mouth daily.     • Budesonide (ENTOCORT EC) 3 MG 24 hr capsule Take 9 mg by mouth Every Morning.     • busPIRone (BUSPAR) 30 MG tablet Take 15 mg by mouth 2 (Two) Times a Day As Needed (anxiety).     • clopidogrel (PLAVIX) 75 MG tablet TAKE 1 TABLET BY MOUTH DAILY. 90 tablet 3   • diazePAM (VALIUM) 10 MG tablet Take 10 mg by mouth 2 (Two) Times a Day As Needed for Anxiety.     • HYDROcodone-acetaminophen (NORCO) 7.5-325 MG per tablet Take 1 tablet by mouth Every 6 (Six) Hours As Needed for Moderate Pain .     • levothyroxine (SYNTHROID, LEVOTHROID) 25 MCG tablet      • metoprolol tartrate (LOPRESSOR) 25 MG tablet Take 1 tablet by mouth 2 (Two) Times a Day. 60 tablet 5   • Multiple Vitamins-Minerals (MULTIVITAL PO) Take  by mouth.     • nitroglycerin (NITROSTAT) 0.4 MG SL tablet Place 0.4 mg under the tongue Every 5 (Five) Minutes As Needed for Chest Pain. Take no more than 3 doses in 15 minutes.     • Omega-3 Fatty Acids (FISH OIL) 1000 MG capsule capsule Take 1,000 mg by mouth Daily With Breakfast.     • potassium chloride (K-DUR) 10 MEQ CR tablet      • rosuvastatin (CRESTOR) 40 MG tablet Take 40 mg by mouth Every Night.  5   • varenicline (Chantix Starting Month Pak) 0.5 MG X 11 & 1 MG X 42 tablet Take 0.5 mg one daily on days 1-3 and and 0.5 mg twice daily on days 4-7.Then 1 mg twice daily for a total of 12 weeks. 53 tablet 0   • anastrozole (ARIMIDEX) 1 MG tablet TAKE 1 TABLET DAILY 30 tablet 5   • anastrozole (ARIMIDEX) 1 MG tablet Take  1 tablet by mouth Daily. 30 tablet 5     No current facility-administered medications for this visit.      Past Medical History:   Diagnosis Date   • Arthritis    • Breast cancer (CMS/HCC) 11/28/2017    bilateral   • Coronary artery disease     3 HEART STENTS   • Gastroesophageal reflux    • Glaucoma    • Hypertension    • MI (myocardial infarction) (CMS/HCC)    • Migraine    • Peripheral arterial disease (CMS/HCC)     KIM LEG STENTS       ANDFEM POP   • Peripheral vascular disease (CMS/HCC)    • SSS (sick sinus syndrome) (CMS/HCC)    • Status post placement of cardiac pacemaker    • Wears dentures    • Wears glasses      Past Surgical History:   Procedure Laterality Date   • BREAST LUMPECTOMY Right 12/12/2017    Procedure: BREAST LUMPECTOMY WITH NEEDLE LOCALIZATION;  Surgeon: Lida Babin MD;  Location: [facility];  Service:    • BREAST LUMPECTOMY Bilateral 12/27/2017    Procedure: right breast re-excision  left breast re-excision with 3 cm inferior and superior advancement flap;  Surgeon: Lida Babin MD;  Location: [facility] OR;  Service:    • BREAST LUMPECTOMY WITH SENTINEL NODE BIOPSY AND AXILLARY NODE DISSECTION Left 12/12/2017    Procedure: Left BREAST LUMPECTOMY WITH SENTINEL NODE BIOPSY  Isotope and Lymphazurin injection left breast. Left breast preoperative needle localization;  Surgeon: Lida Babin MD;  Location: [facility] OR;  Service:    • BREAST SURGERY      Mass Removal.   • CARDIAC CATHETERIZATION     • CARDIAC CATHETERIZATION N/A 6/26/2019    Procedure: Left Heart Cath;  Surgeon: Salvador Mccarthy MD;  Location: [facility] CATH INVASIVE LOCATION;  Service: Cardiology   • CARPAL TUNNEL RELEASE     • CATARACT EXTRACTION     • FEMORAL DISTAL BYPASS     • HIP SURGERY     • HYSTERECTOMY     • PACEMAKER IMPLANTATION     • NV RT/LT HEART CATHETERS N/A 6/26/2019    Procedure: Percutaneous Coronary Intervention;  Surgeon: Gus Suero MD;  Location: [facility] CATH INVASIVE LOCATION;  Service: Cardiology  "  • SHOULDER ARTHROSCOPY W/ ROTATOR CUFF REPAIR Left 3/8/2017    Procedure: LEFT SHOULDER ARTHROSCOPY, ACROMIOPLASTY, SUBACROMIC DEBRIDMENT;  Surgeon: Tesfaye Polo MD;  Location: [facility];  Service:    • SHOULDER SURGERY         The following portions of the patient's history were reviewed and updated as appropriate: allergies, current medications, past family history, past medical history, past social history, past surgical history and problem list.    Review of Systems  General: negative  Integumentary: negative  Eyes: negative  ENT: negative  Respiratory: negative  Gastrointestinal: diarrhea  Cardiovascular: negative  Neurological: negative  Psychiatric: negative  Hematologic/Lymphatic: easy bruising  Genitourinary: negative  Musculoskeletal: painful joints  Endocrine: history of thyroid problem  Breasts: negative    Objective   Ht 160 cm (63\")   Wt 50.4 kg (111 lb 3.2 oz)   BMI 19.70 kg/m²    Physical Exam  General:  This is a WD WN female in no acute distress  Vital signs stable, afebrile  HEENT exam:  WNL. Sclera are anicteric.  EOMI  Neck:  supple, FROM.  No JVD.  Trachea midline.  No palpable thyroid nodules. No supraclavicular adenopathy  Lungs:  Respiratory effort normal. Auscultation: Clear, without wheezes, rhonchi, rales  Heart:  Regular rate and rhythm, without murmur, gallop, rub.  No pedal edema  Breasts: On visual inspection there is some mild volume loss on the left..  Examination of the right breast demonstrates a well-healed circumareolar scar, without discrete mass, skin change, axillary adenopathy.  Examination of the left breast demonstrates a well-healed circumareolar scar, without discrete mass, skin change, axillary adenopathy.  Abdomen: Nontender, without hepatosplenomegaly  Musculoskeletal:  muscle strength/tone is normal.    Psyc:  alert, oriented x 3.  Mood and affect are appropriate  skin:  Warm with good turgor.  Extensive bruising secondary to Plavix  extremities: "  Examination of the extremities revealed no cyanosis, clubbing or edema.    Results/Data  Mammogram reports and images and most recent bone density report were reviewed.  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  Grade 1 infiltrating ductal carcinoma of the left breast T1b N0 MX, ER positive, RI positive  Both sites with clear margins on reexcision  Osteopenia  Diarrhea    Bilateral mammogram in 1 years time with return to the office following  Continue anastrozole  Patient to call in 1 month if she would like a refill of the medication for her collagenous colitis       Discussion/Summary    Patient's Body mass index is 19.7 kg/m². BMI is within normal limits..         Future Appointments   Date Time Provider Department Center   4/13/2021  3:45 PM Salvador Mccarthy MD [facility] None         Please note that portions of this note were completed with a voice recognition program.

## 2020-12-10 ENCOUNTER — PREP FOR SURGERY (OUTPATIENT)
Dept: OTHER | Facility: HOSPITAL | Age: 75
End: 2020-12-10

## 2020-12-10 ENCOUNTER — CLINICAL SUPPORT (OUTPATIENT)
Dept: CARDIOLOGY | Facility: CLINIC | Age: 75
End: 2020-12-10

## 2020-12-10 DIAGNOSIS — Z45.010 PACEMAKER BATTERY DEPLETION: Primary | ICD-10-CM

## 2020-12-10 DIAGNOSIS — I49.5 SSS (SICK SINUS SYNDROME) (HCC): Primary | ICD-10-CM

## 2020-12-10 PROCEDURE — 93280 PM DEVICE PROGR EVAL DUAL: CPT | Performed by: INTERNAL MEDICINE

## 2020-12-11 ENCOUNTER — TRANSCRIBE ORDERS (OUTPATIENT)
Dept: ADMINISTRATIVE | Facility: HOSPITAL | Age: 75
End: 2020-12-11

## 2020-12-11 DIAGNOSIS — Z01.818 PREOP EXAMINATION: Primary | ICD-10-CM

## 2020-12-14 ENCOUNTER — LAB (OUTPATIENT)
Dept: LAB | Facility: HOSPITAL | Age: 75
End: 2020-12-14

## 2020-12-14 DIAGNOSIS — Z01.818 PREOP EXAMINATION: ICD-10-CM

## 2020-12-14 PROCEDURE — U0004 COV-19 TEST NON-CDC HGH THRU: HCPCS | Performed by: INTERNAL MEDICINE

## 2020-12-14 PROCEDURE — C9803 HOPD COVID-19 SPEC COLLECT: HCPCS

## 2020-12-15 LAB — SARS-COV-2 RNA RESP QL NAA+PROBE: NOT DETECTED

## 2020-12-16 ENCOUNTER — HOSPITAL ENCOUNTER (OUTPATIENT)
Facility: HOSPITAL | Age: 75
Discharge: HOME | End: 2020-12-16
Attending: INTERNAL MEDICINE | Admitting: INTERNAL MEDICINE

## 2020-12-16 VITALS
TEMPERATURE: 97.6 F | WEIGHT: 110 LBS | SYSTOLIC BLOOD PRESSURE: 149 MMHG | OXYGEN SATURATION: 99 % | DIASTOLIC BLOOD PRESSURE: 68 MMHG | HEART RATE: 63 BPM | HEIGHT: 63 IN | RESPIRATION RATE: 18 BRPM | BODY MASS INDEX: 19.49 KG/M2

## 2020-12-16 DIAGNOSIS — Z45.010 PACEMAKER BATTERY DEPLETION: ICD-10-CM

## 2020-12-16 LAB
ANION GAP SERPL CALCULATED.3IONS-SCNC: 7.2 MMOL/L (ref 5–15)
BUN SERPL-MCNC: 16 MG/DL (ref 8–23)
BUN/CREAT SERPL: 13.3 (ref 7–25)
CALCIUM SPEC-SCNC: 9.2 MG/DL (ref 8.6–10.5)
CHLORIDE SERPL-SCNC: 110 MMOL/L (ref 98–107)
CO2 SERPL-SCNC: 26.8 MMOL/L (ref 22–29)
CREAT SERPL-MCNC: 1.2 MG/DL (ref 0.57–1)
DEPRECATED RDW RBC AUTO: 52.5 FL (ref 37–54)
ERYTHROCYTE [DISTWIDTH] IN BLOOD BY AUTOMATED COUNT: 14 % (ref 12.3–15.4)
GFR SERPL CREATININE-BSD FRML MDRD: 44 ML/MIN/1.73
GLUCOSE SERPL-MCNC: 89 MG/DL (ref 65–99)
HCT VFR BLD AUTO: 43.4 % (ref 34–46.6)
HGB BLD-MCNC: 13.4 G/DL (ref 12–15.9)
MCH RBC QN AUTO: 31.3 PG (ref 26.6–33)
MCHC RBC AUTO-ENTMCNC: 30.9 G/DL (ref 31.5–35.7)
MCV RBC AUTO: 101.4 FL (ref 79–97)
PLATELET # BLD AUTO: 232 10*3/MM3 (ref 140–450)
PMV BLD AUTO: 9.7 FL (ref 6–12)
POTASSIUM SERPL-SCNC: 4.2 MMOL/L (ref 3.5–5.2)
RBC # BLD AUTO: 4.28 10*6/MM3 (ref 3.77–5.28)
SODIUM SERPL-SCNC: 144 MMOL/L (ref 136–145)
WBC # BLD AUTO: 11.54 10*3/MM3 (ref 3.4–10.8)

## 2020-12-16 PROCEDURE — 85027 COMPLETE CBC AUTOMATED: CPT | Performed by: INTERNAL MEDICINE

## 2020-12-16 PROCEDURE — 99218 PR INITIAL OBSERVATION CARE/DAY 30 MINUTES: CPT | Performed by: INTERNAL MEDICINE

## 2020-12-16 PROCEDURE — C1785 PMKR, DUAL, RATE-RESP: HCPCS | Performed by: INTERNAL MEDICINE

## 2020-12-16 PROCEDURE — 99152 MOD SED SAME PHYS/QHP 5/>YRS: CPT | Performed by: INTERNAL MEDICINE

## 2020-12-16 PROCEDURE — 33228 REMV&REPLC PM GEN DUAL LEAD: CPT | Performed by: INTERNAL MEDICINE

## 2020-12-16 PROCEDURE — 25010000002 VANCOMYCIN 1 G RECONSTITUTED SOLUTION 1 EACH VIAL: Performed by: INTERNAL MEDICINE

## 2020-12-16 PROCEDURE — 80048 BASIC METABOLIC PNL TOTAL CA: CPT | Performed by: INTERNAL MEDICINE

## 2020-12-16 PROCEDURE — 25010000002 FENTANYL CITRATE (PF) 100 MCG/2ML SOLUTION: Performed by: INTERNAL MEDICINE

## 2020-12-16 PROCEDURE — 25010000002 VANCOMYCIN 5 G RECONSTITUTED SOLUTION 5,000 MG VIAL: Performed by: INTERNAL MEDICINE

## 2020-12-16 DEVICE — GEN PM ASSURITY MRI DR RF PM2272: Type: IMPLANTABLE DEVICE | Status: FUNCTIONAL

## 2020-12-16 RX ORDER — SODIUM CHLORIDE 0.9 % (FLUSH) 0.9 %
10 SYRINGE (ML) INJECTION AS NEEDED
Status: CANCELLED | OUTPATIENT
Start: 2020-12-16

## 2020-12-16 RX ORDER — LIDOCAINE HYDROCHLORIDE 20 MG/ML
INJECTION, SOLUTION INFILTRATION; PERINEURAL AS NEEDED
Status: DISCONTINUED | OUTPATIENT
Start: 2020-12-16 | End: 2020-12-16 | Stop reason: HOSPADM

## 2020-12-16 RX ORDER — FENTANYL CITRATE 50 UG/ML
INJECTION, SOLUTION INTRAMUSCULAR; INTRAVENOUS AS NEEDED
Status: DISCONTINUED | OUTPATIENT
Start: 2020-12-16 | End: 2020-12-16 | Stop reason: HOSPADM

## 2020-12-16 NOTE — DISCHARGE INSTR - LAB
Follow up at Dr Mccarthy's office on Friday December 18th for a wound check.   Follow up at Dr Mccarthy's office on Tuesday December 22nd for staple removal.

## 2020-12-16 NOTE — H&P
History and Physical:  Date of Admit: 12/16/2020    Patient Active Problem List   Diagnosis   •  (ASCVD), s/p stenting in 1999, after an MI, repeat stenting in 2005 of the LAD and RCA at [facility] and stenting of the first OM branch of left circumflex on 6/26/2019.Clinically stable.   • SSS (sick sinus syndrome), s/p permanent pacemaker implantion,    • Palpitations   • Dizziness   • Dyslipidemia, currently on rosuvastatin 40 mg daily   • Essential hypertension, blood pressure is running low associated with fatigue.   • PVD (peripheral vascular disease) (CMS/HCC)   • Swelling of both lower extremities   • Chronic left shoulder pain   • S/P arthroscopy of shoulder   • Coagulopathy (CMS/HCC)   • Bilateral malignant neoplasm of breast in female, estrogen receptor positive (CMS/HCC)   • Tobacco abuse   • Aromatase inhibitor use   • Osteopenia   • Abnormal EKG   • Abnormal nuclear stress test   • Collagenous colitis   • Pacemaker battery depletion         Chief complaint: Pacemaker battery depletion, patient is here for pacemaker battery replacement.    Subjective     History of Present Illness Ms. Valdez is a 75-year-old  female with history of sick sinus syndrome for which she has had permanent dual-chamber pacemaker implanted about 10 years ago, was noted to have pacemaker battery depletion requiring pacemaker battery placement.  She is brought in for the same.  She denies any chest pains, shortness of breath, palpitations, dizziness or syncope.      Past Medical History:   Diagnosis Date   • Arthritis    • Breast cancer (CMS/HCC) 11/28/2017    bilateral   • Coronary artery disease     3 HEART STENTS   • Gastroesophageal reflux    • Glaucoma    • Hypertension    • MI (myocardial infarction) (CMS/HCC)    • Migraine    • Peripheral arterial disease (CMS/HCC)     KIM LEG STENTS       ANDFEM POP   • Peripheral vascular disease (CMS/HCC)    • SSS (sick sinus syndrome) (CMS/HCC)    • Status post placement of  cardiac pacemaker    • Wears dentures    • Wears glasses      Past Surgical History:   Procedure Laterality Date   • BREAST LUMPECTOMY Right 12/12/2017    Procedure: BREAST LUMPECTOMY WITH NEEDLE LOCALIZATION;  Surgeon: Lida Babin MD;  Location: [facility];  Service:    • BREAST LUMPECTOMY Bilateral 12/27/2017    Procedure: right breast re-excision  left breast re-excision with 3 cm inferior and superior advancement flap;  Surgeon: Lida Babin MD;  Location: [facility];  Service:    • BREAST LUMPECTOMY WITH SENTINEL NODE BIOPSY AND AXILLARY NODE DISSECTION Left 12/12/2017    Procedure: Left BREAST LUMPECTOMY WITH SENTINEL NODE BIOPSY  Isotope and Lymphazurin injection left breast. Left breast preoperative needle localization;  Surgeon: Lida Babin MD;  Location: [facility];  Service:    • BREAST SURGERY      Mass Removal.   • CARDIAC CATHETERIZATION     • CARDIAC CATHETERIZATION N/A 6/26/2019    Procedure: Left Heart Cath;  Surgeon: Salvador Mccarthy MD;  Location: [facility] CATH INVASIVE LOCATION;  Service: Cardiology   • CARPAL TUNNEL RELEASE     • CATARACT EXTRACTION     • FEMORAL DISTAL BYPASS     • HIP SURGERY     • HYSTERECTOMY     • PACEMAKER IMPLANTATION     • ID RT/LT HEART CATHETERS N/A 6/26/2019    Procedure: Percutaneous Coronary Intervention;  Surgeon: Gus Suero MD;  Location: [facility] CATH INVASIVE LOCATION;  Service: Cardiology   • SHOULDER ARTHROSCOPY W/ ROTATOR CUFF REPAIR Left 3/8/2017    Procedure: LEFT SHOULDER ARTHROSCOPY, ACROMIOPLASTY, SUBACROMIC DEBRIDMENT;  Surgeon: Tesfaye Polo MD;  Location: [facility];  Service:    • SHOULDER SURGERY       Family History   Problem Relation Age of Onset   • Cancer Brother    • No Known Problems Mother    • Breast cancer Neg Hx      Social History     Tobacco Use   • Smoking status: Current Every Day Smoker     Packs/day: 1.00     Years: 54.00     Pack years: 54.00     Types: Cigarettes     Start date: 1961   • Smokeless  tobacco: Never Used   Substance Use Topics   • Alcohol use: No   • Drug use: No       Medications Prior to Admission   Medication Sig Dispense Refill Last Dose   • anastrozole (ARIMIDEX) 1 MG tablet Take 1 tablet by mouth Daily for 30 days. 30 tablet 5 12/15/2020 at Unknown time   • aspirin 81 MG EC tablet Take 81 mg by mouth daily.   12/15/2020 at Unknown time   • Budesonide (ENTOCORT EC) 3 MG 24 hr capsule Take 3 capsules by mouth Every Morning for 30 days. 90 capsule 0 12/15/2020 at Unknown time   • busPIRone (BUSPAR) 30 MG tablet Take 15 mg by mouth 2 (Two) Times a Day As Needed (anxiety).   12/15/2020 at Unknown time   • clopidogrel (PLAVIX) 75 MG tablet TAKE 1 TABLET BY MOUTH DAILY. 90 tablet 3 12/15/2020 at Unknown time   • diazePAM (VALIUM) 10 MG tablet Take 10 mg by mouth 2 (Two) Times a Day As Needed for Anxiety.   12/15/2020 at Unknown time   • HYDROcodone-acetaminophen (NORCO) 7.5-325 MG per tablet Take 1 tablet by mouth Every 6 (Six) Hours As Needed for Moderate Pain .   12/15/2020 at Unknown time   • levothyroxine (SYNTHROID, LEVOTHROID) 25 MCG tablet    12/15/2020 at Unknown time   • metoprolol tartrate (LOPRESSOR) 25 MG tablet Take 1 tablet by mouth 2 (Two) Times a Day. 60 tablet 5 12/15/2020 at Unknown time   • Multiple Vitamins-Minerals (MULTIVITAL PO) Take  by mouth.   12/15/2020 at Unknown time   • Omega-3 Fatty Acids (FISH OIL) 1000 MG capsule capsule Take 1,000 mg by mouth Daily With Breakfast.   12/15/2020 at Unknown time   • potassium chloride (K-DUR) 10 MEQ CR tablet    12/15/2020 at Unknown time   • rosuvastatin (CRESTOR) 40 MG tablet Take 40 mg by mouth Every Night.  5 12/15/2020 at Unknown time   • anastrozole (ARIMIDEX) 1 MG tablet TAKE 1 TABLET DAILY 30 tablet 5    • anastrozole (ARIMIDEX) 1 MG tablet Take 1 tablet by mouth Daily. 30 tablet 5    • anastrozole (ARIMIDEX) 1 MG tablet Take 1 tablet by mouth Daily for 30 days. 30 tablet 11    • nitroglycerin (NITROSTAT) 0.4 MG SL tablet  Place 0.4 mg under the tongue Every 5 (Five) Minutes As Needed for Chest Pain. Take no more than 3 doses in 15 minutes.   More than a month at Unknown time   • varenicline (Chantix Starting Month Pak) 0.5 MG X 11 & 1 MG X 42 tablet Take 0.5 mg one daily on days 1-3 and and 0.5 mg twice daily on days 4-7.Then 1 mg twice daily for a total of 12 weeks. 53 tablet 0        Allergies:  Promethazine    Review of Systems   Constitutional: Negative for appetite change, chills and fever.   HENT: Negative for congestion, ear discharge, ear pain and sore throat.    Eyes: Negative for pain and redness.   Respiratory: Negative for cough, shortness of breath and wheezing.    Cardiovascular: Negative for palpitations and leg swelling.   Gastrointestinal: Negative for abdominal pain, diarrhea, nausea and vomiting.   Endocrine: Negative for cold intolerance, heat intolerance, polydipsia and polyuria.   Genitourinary: Negative for dysuria and hematuria.   Skin: Negative for rash.   Neurological: Negative for seizures, syncope and headaches.   Psychiatric/Behavioral: Negative for confusion. The patient is not nervous/anxious.            Objective      Vital Signs  Temp:  [97.6 °F (36.4 °C)] 97.6 °F (36.4 °C)  Heart Rate:  [60] 60  Resp:  [16-17] 16  BP: (168)/(78) 168/78  Body mass index is 19.49 kg/m².  No intake or output data in the 24 hours ending 12/16/20 0939    Physical Exam  Constitutional:       General: She is awake. She is not in acute distress.     Appearance: She is well-developed.   HENT:      Head: Normocephalic and atraumatic.   Eyes:      General: Lids are normal. No scleral icterus.     Extraocular Movements: Extraocular movements intact.      Conjunctiva/sclera: Conjunctivae normal.   Neck:      Musculoskeletal: Neck supple.      Thyroid: No thyromegaly.      Vascular: No carotid bruit or JVD.      Trachea: No tracheal deviation.   Cardiovascular:      Rate and Rhythm: Normal rate and regular rhythm.      Heart  sounds: Normal heart sounds, S1 normal and S2 normal. No murmur. No friction rub. No gallop.    Pulmonary:      Effort: Pulmonary effort is normal. No respiratory distress.      Breath sounds: Normal breath sounds. No wheezing or rales.   Abdominal:      General: Bowel sounds are normal. There is no distension.      Palpations: There is no hepatomegaly, splenomegaly or mass.      Tenderness: There is no abdominal tenderness. There is no guarding.   Skin:     General: Skin is warm and dry.      Findings: No rash.   Neurological:      General: No focal deficit present.      Mental Status: She is alert and oriented to person, place, and time.      Cranial Nerves: No cranial nerve deficit.   Psychiatric:         Mood and Affect: Mood and affect normal.         Speech: Speech normal.         Behavior: Behavior is cooperative.          Results Review:    I reviewed the patient's new clinical results.      Results from last 7 days   Lab Units 12/16/20  0812   WBC 10*3/mm3 11.54*   HEMOGLOBIN g/dL 13.4   PLATELETS 10*3/mm3 232     Results from last 7 days   Lab Units 12/16/20  0812   SODIUM mmol/L 144   POTASSIUM mmol/L 4.2   CHLORIDE mmol/L 110*   CO2 mmol/L 26.8   BUN mg/dL 16   CREATININE mg/dL 1.20*   CALCIUM mg/dL 9.2   GLUCOSE mg/dL 89     Lab Results   Component Value Date    INR 0.99 06/19/2018    INR <0.90 01/02/2016     Lab Results   Component Value Date    MG 2.2 01/02/2016     Lab Results   Component Value Date    TSH 2.355 01/02/2016    CHLPL 146 01/02/2016    TRIG 105 10/13/2016    HDL 52 (L) 10/13/2016    LDL 57 10/13/2016      Lab Results   Component Value Date    .0 (H) 06/19/2018       Assessment/Plan    Assessment:        Pacemaker battery depletion      Assessment & Plan   Plan:   Pacemaker battery replacement.  The procedure, potential risks benefits and alternatives were explained to the patient and she expressed understanding of the same and wanted to proceed.      Thank you very much for  asking us to be involved in this patient's care.  We will follow along with you.      Salvador Mccarthy MD  12/16/20  09:39 EST    Please note that portions of this note were completed with a voice recognition program.

## 2020-12-16 NOTE — DISCHARGE INSTR - ACTIVITY
Keep wound clean and dry until Tuesday December 22nd.    heparin gtt paused for 60 minutes. will restart heparin gtt @ 8ml/hr in 60 minutes. heparin gtt reordered for 5 ml/hr. will start once verified by pharmacy. NP aware. Continue to monitor Ptt NP aware. Follow full anticoagulation nomogram. Hold for one Hour and restart at 11ml/hr. NP aware. continue to monitor Ptt

## 2020-12-18 ENCOUNTER — CLINICAL SUPPORT (OUTPATIENT)
Dept: CARDIOLOGY | Facility: CLINIC | Age: 75
End: 2020-12-18

## 2020-12-18 NOTE — PROGRESS NOTES
Wound clean.  Zero drainage.  Bruising noted with very minimal swelling. Wound cleansed and new bandage applied.  Patient to return Tuesday to have staples removed

## 2020-12-22 ENCOUNTER — CLINICAL SUPPORT (OUTPATIENT)
Dept: CARDIOLOGY | Facility: CLINIC | Age: 75
End: 2020-12-22

## 2020-12-22 NOTE — PROGRESS NOTES
Staples removed. No drainage noted. Steri-strips applied. Patient education given. Patient verbalized understanding

## 2021-01-12 ENCOUNTER — TELEPHONE (OUTPATIENT)
Dept: SURGERY | Facility: CLINIC | Age: 76
End: 2021-01-12

## 2021-02-04 ENCOUNTER — OFFICE VISIT (OUTPATIENT)
Dept: CARDIOLOGY | Facility: CLINIC | Age: 76
End: 2021-02-04

## 2021-02-04 VITALS
RESPIRATION RATE: 16 BRPM | SYSTOLIC BLOOD PRESSURE: 108 MMHG | HEART RATE: 62 BPM | WEIGHT: 120 LBS | BODY MASS INDEX: 21.26 KG/M2 | HEIGHT: 63 IN | DIASTOLIC BLOOD PRESSURE: 55 MMHG | TEMPERATURE: 95.3 F

## 2021-02-04 DIAGNOSIS — I49.5 SSS (SICK SINUS SYNDROME) (HCC): ICD-10-CM

## 2021-02-04 DIAGNOSIS — I10 ESSENTIAL HYPERTENSION: ICD-10-CM

## 2021-02-04 DIAGNOSIS — Z72.0 TOBACCO ABUSE: ICD-10-CM

## 2021-02-04 DIAGNOSIS — E78.5 DYSLIPIDEMIA: ICD-10-CM

## 2021-02-04 DIAGNOSIS — I25.10 ARTERIOSCLEROTIC CARDIOVASCULAR DISEASE (ASCVD): Primary | ICD-10-CM

## 2021-02-04 PROCEDURE — 99214 OFFICE O/P EST MOD 30 MIN: CPT | Performed by: INTERNAL MEDICINE

## 2021-02-04 RX ORDER — ANASTROZOLE 1 MG/1
1 TABLET ORAL DAILY
COMMUNITY

## 2021-02-04 RX ORDER — ROSUVASTATIN CALCIUM 40 MG/1
40 TABLET, COATED ORAL NIGHTLY
Qty: 90 TABLET | Refills: 2 | Status: SHIPPED | OUTPATIENT
Start: 2021-02-04

## 2021-02-04 RX ORDER — POTASSIUM CHLORIDE 750 MG/1
10 TABLET, FILM COATED, EXTENDED RELEASE ORAL DAILY
Qty: 30 TABLET | Refills: 3 | Status: SHIPPED | OUTPATIENT
Start: 2021-02-04

## 2021-02-04 NOTE — PROGRESS NOTES
Yasmine Reynolds MD  Sirisha Valdez  1945  02/04/2021    Patient Active Problem List   Diagnosis   •  (ASCVD), s/p stenting in 1999, after an MI, repeat stenting in 2005 of the LAD and RCA at [facility] and stenting of the first OM branch of left circumflex on 6/26/2019.Clinically stable.   • SSS (sick sinus syndrome), s/p permanent pacemaker implantion,    • Palpitations   • Dizziness   • Dyslipidemia, currently on rosuvastatin 40 mg daily   • Essential hypertension, blood pressure is running low associated with fatigue.   • PVD (peripheral vascular disease) (CMS/HCC)   • Swelling of both lower extremities   • Chronic left shoulder pain   • S/P arthroscopy of shoulder   • Coagulopathy (CMS/HCC)   • Bilateral malignant neoplasm of breast in female, estrogen receptor positive (CMS/HCC)   • Tobacco abuse   • Aromatase inhibitor use   • Osteopenia   • Abnormal EKG   • Abnormal nuclear stress test   • Collagenous colitis   • Pacemaker battery depletion       Dear Yasmine Reynolds MD:    Subjective     Sirisha Valdez is a 75 y.o. female with the problems as listed above, presents    Chief complaint: Follow-up of ASCVD and sick sinus syndrome.    History of Present Illness: Ms. Valdez is a pleasant 74-year-old  female with history of known ASCVD, status post stenting in 1999 and 2005 and in June 2019 involving LAD and RCA and first OM branch of the left circumflex coronary artery.  She also has underlying sick sinus syndrome, status post permanent pacemaker implantation with battery replacement in December 2020.  She is here for regular cardiology follow-up.  On today's visit she denies any complaints of chest pains or shortness of breath and overall has been doing fine.  She has long history of smoking and continues smoke about a pack a day.      Allergies   Allergen Reactions   • Promethazine      hypotension   :      Current Outpatient Medications:   •  anastrozole (ARIMIDEX) 1 MG  tablet, Take 1 mg by mouth Daily., Disp: , Rfl:   •  aspirin 81 MG EC tablet, Take 81 mg by mouth daily., Disp: , Rfl:   •  BUDESONIDE ER PO, Take 9 mg by mouth Every Morning., Disp: , Rfl:   •  busPIRone (BUSPAR) 30 MG tablet, Take 15 mg by mouth 2 (Two) Times a Day As Needed (anxiety)., Disp: , Rfl:   •  diazePAM (VALIUM) 10 MG tablet, Take 10 mg by mouth 2 (Two) Times a Day As Needed for Anxiety., Disp: , Rfl:   •  HYDROcodone-acetaminophen (NORCO) 7.5-325 MG per tablet, Take 1 tablet by mouth Every 6 (Six) Hours As Needed for Moderate Pain ., Disp: , Rfl:   •  levothyroxine (SYNTHROID, LEVOTHROID) 25 MCG tablet, , Disp: , Rfl:   •  metoprolol tartrate (LOPRESSOR) 25 MG tablet, Take 1 tablet by mouth 2 (Two) Times a Day., Disp: 180 tablet, Rfl: 2  •  Multiple Vitamins-Minerals (MULTIVITAL PO), Take  by mouth., Disp: , Rfl:   •  nitroglycerin (NITROSTAT) 0.4 MG SL tablet, Place 0.4 mg under the tongue Every 5 (Five) Minutes As Needed for Chest Pain. Take no more than 3 doses in 15 minutes., Disp: , Rfl:   •  Omega-3 Fatty Acids (FISH OIL) 1000 MG capsule capsule, Take 1,000 mg by mouth Daily With Breakfast., Disp: , Rfl:   •  potassium chloride 10 MEQ CR tablet, Take 1 tablet by mouth Daily., Disp: 30 tablet, Rfl: 3  •  rosuvastatin (CRESTOR) 40 MG tablet, Take 1 tablet by mouth Every Night., Disp: 90 tablet, Rfl: 2  •  anastrozole (ARIMIDEX) 1 MG tablet, Take 1 tablet by mouth Daily for 30 days., Disp: 30 tablet, Rfl: 5  •  anastrozole (ARIMIDEX) 1 MG tablet, Take 1 tablet by mouth Daily for 30 days., Disp: 30 tablet, Rfl: 11  •  varenicline (Chantix Starting Month [place]) 0.5 MG X 11 & 1 MG X 42 tablet, Take 0.5 mg one daily on days 1-3 and and 0.5 mg twice daily on days 4-7.Then 1 mg twice daily for a total of 12 weeks., Disp: 53 tablet, Rfl: 0      The following portions of the patient's history were reviewed and updated as appropriate: allergies, current medications, past family history, past medical history,  "past social history, past surgical history and problem list.    Social History     Tobacco Use   • Smoking status: Current Every Day Smoker     Packs/day: 1.00     Years: 54.00     Pack years: 54.00     Types: Cigarettes     Start date: 1961   • Smokeless tobacco: Never Used   Substance Use Topics   • Alcohol use: No   • Drug use: No       Review of Systems   Cardiovascular: Negative for chest pain, leg swelling and palpitations.   Respiratory: Negative for shortness of breath.        Objective   Vitals:    02/04/21 1207   BP: 108/55   Pulse: 62   Resp: 16   Temp: 95.3 °F (35.2 °C)   Weight: 54.4 kg (120 lb)   Height: 160 cm (63\")     Body mass index is 21.26 kg/m².        Vitals signs reviewed.   Constitutional:       Appearance: Well-developed.   Eyes:      Conjunctiva/sclera: Conjunctivae normal.   HENT:      Head: Normocephalic.   Neck:      Musculoskeletal: Normal range of motion and neck supple.      Thyroid: No thyromegaly.      Vascular: No JVD.      Trachea: No tracheal deviation.   Pulmonary:      Effort: No respiratory distress.      Breath sounds: Normal breath sounds. No wheezing. No rales.   Cardiovascular:      PMI at left midclavicular line. Normal rate. Regular rhythm. Normal S1. Normal S2.      Murmurs: There is no murmur.      No gallop. No click. No rub.   Pulses:     Intact distal pulses.   Edema:     Peripheral edema absent.   Abdominal:      General: Bowel sounds are normal.      Palpations: Abdomen is soft. There is no abdominal mass.      Tenderness: There is no abdominal tenderness.   Skin:     General: Skin is warm and dry.   Neurological:      Mental Status: Alert and oriented to person, place, and time.      Cranial Nerves: No cranial nerve deficit.         Lab Results   Component Value Date     12/16/2020    K 4.2 12/16/2020     (H) 12/16/2020    CO2 26.8 12/16/2020    BUN 16 12/16/2020    CREATININE 1.20 (H) 12/16/2020    GLUCOSE 89 12/16/2020    CALCIUM 9.2 12/16/2020    " AST 12 06/24/2019    ALT 10 06/24/2019    ALKPHOS 65 06/24/2019    LABIL2 1.4 (L) 01/04/2016     Lab Results   Component Value Date    CKTOTAL 60 01/02/2016     Lab Results   Component Value Date    WBC 11.54 (H) 12/16/2020    HGB 13.4 12/16/2020    HCT 43.4 12/16/2020     12/16/2020       Assessment/Plan :   Diagnosis Plan   1.  (ASCVD), s/p stenting in 1999, after an MI, repeat stenting in 2005 of the LAD and RCA at [facility] and stenting of the first OM branch of left circumflex on 6/26/2019.Clinically stable.     2. SSS (sick sinus syndrome), clinically stable.    3. Tobacco abuse about pack a day.    4. Essential hypertension, controlled.     5. Dyslipidemia, currently on rosuvastatin 40 mg daily, tolerating well.         Recommendations:  1. Continue with aspirin, rosuvastatin, metoprolol tartrate at current doses.  2. I have reemphasized for her to quit smoking.  She said she could not tolerate Chantix due to mood changes.  She said she also tried nicotine patches without success.  3. Check fasting lipid panel and CMP.    Return in about 6 months (around 8/4/2021) for or sooner if needed.    As always,   I appreciate very much the opportunity to participate in the cardiovascular care of your patients. Please do not hesitate to call me with any questions with regards to Sirisha Valdez evaluation and management.       With Best Regards,        Salvador Mccarthy MD, [facility]    Please note that portions of this note were completed with a voice recognition program.

## 2021-02-09 ENCOUNTER — IMMUNIZATION (OUTPATIENT)
Dept: VACCINE CLINIC | Facility: HOSPITAL | Age: 76
End: 2021-02-09

## 2021-02-09 LAB
ALBUMIN SERPL-MCNC: 4 G/DL (ref 3.7–4.7)
ALBUMIN/GLOB SERPL: 2 {RATIO} (ref 1.2–2.2)
ALP SERPL-CCNC: 65 IU/L (ref 39–117)
ALT SERPL-CCNC: 33 IU/L (ref 0–32)
AST SERPL-CCNC: 28 IU/L (ref 0–40)
BILIRUB SERPL-MCNC: 0.4 MG/DL (ref 0–1.2)
BUN SERPL-MCNC: 14 MG/DL (ref 8–27)
BUN/CREAT SERPL: 11 (ref 12–28)
CALCIUM SERPL-MCNC: 9.7 MG/DL (ref 8.7–10.3)
CHLORIDE SERPL-SCNC: 103 MMOL/L (ref 96–106)
CO2 SERPL-SCNC: 25 MMOL/L (ref 20–29)
CREAT SERPL-MCNC: 1.26 MG/DL (ref 0.57–1)
GLOBULIN SER CALC-MCNC: 2 G/DL (ref 1.5–4.5)
GLUCOSE SERPL-MCNC: 92 MG/DL (ref 65–99)
POTASSIUM SERPL-SCNC: 4.1 MMOL/L (ref 3.5–5.2)
PROT SERPL-MCNC: 6 G/DL (ref 6–8.5)
SODIUM SERPL-SCNC: 143 MMOL/L (ref 134–144)

## 2021-02-09 PROCEDURE — 91300 HC SARSCOV02 VAC 30MCG/0.3ML IM: CPT | Performed by: INTERNAL MEDICINE

## 2021-02-09 PROCEDURE — 0001A: CPT | Performed by: INTERNAL MEDICINE

## 2021-02-23 ENCOUNTER — TELEPHONE (OUTPATIENT)
Dept: CARDIOLOGY | Facility: CLINIC | Age: 76
End: 2021-02-23

## 2021-02-23 DIAGNOSIS — R79.89 ELEVATED SERUM CREATININE: Primary | ICD-10-CM

## 2021-02-23 NOTE — TELEPHONE ENCOUNTER
Please put in a referral for a kidney doctor according to the notes attached from 1/21/21    Called pt and advised her. She is okay with seeing kidney doctor. I advised her that they will call her and let her know when her appt is. She expressed understanding.    Billie Cardenas MA   2/9/2021  2:40 PM EST      Called pt no answer LM.    Jose G Contreras, NADEGE   2/9/2021 10:46 AM EST      Patient's kidney function has declined although I do not see that she is on any nephrotoxic medications, can we confirm that she does not see nephrology?  If she does not I think she would benefit from a referral to 1.

## 2021-02-23 NOTE — TELEPHONE ENCOUNTER
"Patient called stating she thought we were going to send her to a kidney doctor for a \"declining kidney\".  No referral in system.  "

## 2021-03-02 ENCOUNTER — IMMUNIZATION (OUTPATIENT)
Dept: VACCINE CLINIC | Facility: HOSPITAL | Age: 76
End: 2021-03-02

## 2021-03-02 PROCEDURE — 0002A: CPT | Performed by: INTERNAL MEDICINE

## 2021-03-02 PROCEDURE — 91300 HC SARSCOV02 VAC 30MCG/0.3ML IM: CPT | Performed by: INTERNAL MEDICINE

## 2021-04-28 ENCOUNTER — TELEPHONE (OUTPATIENT)
Dept: CARDIOLOGY | Facility: CLINIC | Age: 76
End: 2021-04-28

## 2021-04-28 NOTE — TELEPHONE ENCOUNTER
Why do not have the blood work she recently just got done she is going to see the nephrologist perhaps he can help if it is affecting her kidneys

## 2021-04-28 NOTE — TELEPHONE ENCOUNTER
Please call patient regarding the results of her recent bloodwork.  Patient is concerned for her kidneys.

## 2021-05-03 ENCOUNTER — DOCUMENTATION (OUTPATIENT)
Dept: CARDIOLOGY | Facility: CLINIC | Age: 76
End: 2021-05-03

## 2021-05-03 ENCOUNTER — TELEPHONE (OUTPATIENT)
Dept: CARDIOLOGY | Facility: CLINIC | Age: 76
End: 2021-05-03

## 2021-05-03 DIAGNOSIS — N18.31 STAGE 3A CHRONIC KIDNEY DISEASE (HCC): Primary | ICD-10-CM

## 2021-05-03 RX ORDER — BUMETANIDE 1 MG/1
1 TABLET ORAL DAILY
Qty: 30 TABLET | Refills: 1 | Status: SHIPPED | OUTPATIENT
Start: 2021-05-03 | End: 2021-05-21 | Stop reason: SINTOL

## 2021-05-03 NOTE — PROGRESS NOTES
wanted to talk to me about her leg swelling.  She says she has been having a lot of swelling in her both lower extremities over the last few weeks.  She states that she has gained some weight as well recently.  She denies any significant worsening of shortness of breath but if she does get short of breath at times with moderate exertion.  She is currently not on any diuretics.  I told her that I will prescribe her a short course of diuretic such as Bumex 1 mg daily for the 3 days and check her kidney function with the blood test in 3 days.  Patient expressed understanding of the instructions.  I also asked her to cut back on salt intake and avoid foods such as all the chips and pickles as well as processed meats.  Patient expressed understanding.  I have asked her to keep her legs propped up during the daytime as much as possible and at night to keep a thick pillow underneath her legs.  Patient expressed understanding of the instructions.    Salvador Mccarthy MD [facility]  5/3/2021.

## 2021-05-06 ENCOUNTER — LAB (OUTPATIENT)
Dept: LAB | Facility: HOSPITAL | Age: 76
End: 2021-05-06

## 2021-05-06 DIAGNOSIS — N18.31 STAGE 3A CHRONIC KIDNEY DISEASE (HCC): ICD-10-CM

## 2021-05-06 PROCEDURE — 36415 COLL VENOUS BLD VENIPUNCTURE: CPT

## 2021-05-06 PROCEDURE — 80048 BASIC METABOLIC PNL TOTAL CA: CPT

## 2021-05-07 ENCOUNTER — TELEPHONE (OUTPATIENT)
Dept: CARDIOLOGY | Facility: CLINIC | Age: 76
End: 2021-05-07

## 2021-05-07 LAB
ANION GAP SERPL CALCULATED.3IONS-SCNC: 11.1 MMOL/L (ref 5–15)
BUN SERPL-MCNC: 32 MG/DL (ref 8–23)
BUN/CREAT SERPL: 23.4 (ref 7–25)
CALCIUM SPEC-SCNC: 9.3 MG/DL (ref 8.6–10.5)
CHLORIDE SERPL-SCNC: 102 MMOL/L (ref 98–107)
CO2 SERPL-SCNC: 29.9 MMOL/L (ref 22–29)
CREAT SERPL-MCNC: 1.37 MG/DL (ref 0.57–1)
GFR SERPL CREATININE-BSD FRML MDRD: 38 ML/MIN/1.73
GLUCOSE SERPL-MCNC: 159 MG/DL (ref 65–99)
POTASSIUM SERPL-SCNC: 4.2 MMOL/L (ref 3.5–5.2)
SODIUM SERPL-SCNC: 143 MMOL/L (ref 136–145)

## 2021-05-10 ENCOUNTER — TELEPHONE (OUTPATIENT)
Dept: CARDIOLOGY | Facility: CLINIC | Age: 76
End: 2021-05-10

## 2021-05-19 ENCOUNTER — TELEPHONE (OUTPATIENT)
Dept: SURGERY | Facility: CLINIC | Age: 76
End: 2021-05-19

## 2021-05-21 ENCOUNTER — OFFICE VISIT (OUTPATIENT)
Dept: CARDIOLOGY | Facility: CLINIC | Age: 76
End: 2021-05-21

## 2021-05-21 VITALS
HEART RATE: 64 BPM | SYSTOLIC BLOOD PRESSURE: 108 MMHG | WEIGHT: 133.6 LBS | HEIGHT: 63 IN | DIASTOLIC BLOOD PRESSURE: 64 MMHG | BODY MASS INDEX: 23.67 KG/M2 | RESPIRATION RATE: 16 BRPM | TEMPERATURE: 98.2 F

## 2021-05-21 DIAGNOSIS — I25.10 ARTERIOSCLEROTIC CARDIOVASCULAR DISEASE (ASCVD): ICD-10-CM

## 2021-05-21 DIAGNOSIS — R60.0 BILATERAL LEG EDEMA: Primary | ICD-10-CM

## 2021-05-21 DIAGNOSIS — N17.9 ACUTE KIDNEY INJURY (HCC): ICD-10-CM

## 2021-05-21 DIAGNOSIS — I73.9 PERIPHERAL ARTERIAL DISEASE (HCC): ICD-10-CM

## 2021-05-21 PROCEDURE — 93000 ELECTROCARDIOGRAM COMPLETE: CPT | Performed by: INTERNAL MEDICINE

## 2021-05-21 PROCEDURE — 99214 OFFICE O/P EST MOD 30 MIN: CPT | Performed by: INTERNAL MEDICINE

## 2021-05-21 NOTE — PROGRESS NOTES
Yasmine Reynolds MD  Sirisha Valdez  1945  05/21/2021    Patient Active Problem List   Diagnosis   •  (ASCVD), s/p stenting in 1999, after an MI, repeat stenting in 2005 of the LAD and RCA at [facility] and stenting of the first OM branch of left circumflex on 6/26/2019.Clinically stable.   • SSS (sick sinus syndrome), s/p permanent pacemaker implantion,    • Palpitations   • Dizziness   • Dyslipidemia, currently on rosuvastatin 40 mg daily   • Essential hypertension, blood pressure is running low associated with fatigue.   • PVD (peripheral vascular disease) (CMS/HCC)   • Swelling of both lower extremities   • Chronic left shoulder pain   • S/P arthroscopy of shoulder   • Coagulopathy (CMS/HCC)   • Bilateral malignant neoplasm of breast in female, estrogen receptor positive (CMS/HCC)   • Tobacco abuse   • Aromatase inhibitor use   • Osteopenia   • Abnormal EKG   • Abnormal nuclear stress test   • Collagenous colitis   • Pacemaker battery depletion       Dear Yasmine Reynolds MD:    Tom Valdez is a 75 y.o. female with the problems as listed above, presents    Chief Complaint   Patient presents with   • Edema     LE       History of Present Illness: Ms. Valdez is a pleasant 74-year-old  female with history of coronary artery disease, status post previous stenting in 1999 in 2005 and due to June 2019 involving LAD and RCA in the first OM branch of the left circumflex coronary artery.  She also has sick sinus syndrome, status post permanent dual-chamber pacemaker implantation with battery replacement in December 2020.  She is here today mainly with complaints of having bilateral leg swelling for about a month.  She denies any shortness of breath, PND or orthopnea.  She denies any history of DVT.  She does have a history of significant peripheral arterial disease and has had interventions to both iliofemoral arteries with femorofemoral bypass graft performed at [facility] several years ago.  She denies any significant leg claudication although she does complain of cramps at night.  She was recently prescribed Bumex 1 mg daily for 3days.  Her subsequent BMP revealed worsening of her kidney function.      Allergies   Allergen Reactions   • Promethazine      hypotension   :      Current Outpatient Medications:   •  anastrozole (ARIMIDEX) 1 MG tablet, Take 1 mg by mouth Daily., Disp: , Rfl:   •  aspirin 81 MG EC tablet, Take 81 mg by mouth daily., Disp: , Rfl:   •  BUDESONIDE ER PO, Take 9 mg by mouth Every Morning., Disp: , Rfl:   •  busPIRone (BUSPAR) 30 MG tablet, Take 15 mg by mouth 2 (Two) Times a Day As Needed (anxiety)., Disp: , Rfl:   •  diazePAM (VALIUM) 10 MG tablet, Take 10 mg by mouth 2 (Two) Times a Day As Needed for Anxiety., Disp: , Rfl:   •  HYDROcodone-acetaminophen (NORCO) 7.5-325 MG per tablet, Take 1 tablet by mouth Every 6 (Six) Hours As Needed for Moderate Pain ., Disp: , Rfl:   •  levothyroxine (SYNTHROID, LEVOTHROID) 25 MCG tablet, , Disp: , Rfl:   •  metoprolol tartrate (LOPRESSOR) 25 MG tablet, Take 1 tablet by mouth 2 (Two) Times a Day., Disp: 180 tablet, Rfl: 2  •  Multiple Vitamins-Minerals (MULTIVITAL PO), Take  by mouth., Disp: , Rfl:   •  nitroglycerin (NITROSTAT) 0.4 MG SL tablet, Place 0.4 mg under the tongue Every 5 (Five) Minutes As Needed for Chest Pain. Take no more than 3 doses in 15 minutes., Disp: , Rfl:   •  potassium chloride 10 MEQ CR tablet, Take 1 tablet by mouth Daily., Disp: 30 tablet, Rfl: 3  •  rosuvastatin (CRESTOR) 40 MG tablet, Take 1 tablet by mouth Every Night., Disp: 90 tablet, Rfl: 2  •  anastrozole (ARIMIDEX) 1 MG tablet, Take 1 tablet by mouth Daily for 30 days., Disp: 30 tablet, Rfl: 5  •  anastrozole (ARIMIDEX) 1 MG tablet, Take 1 tablet by mouth Daily for 30 days., Disp: 30 tablet, Rfl: 11  •  Omega-3 Fatty Acids (FISH OIL) 1000 MG capsule capsule, Take 1,000 mg by mouth Daily With Breakfast., Disp: ,  "Rfl:   •  varenicline (Chantix Starting Month [place]) 0.5 MG X 11 & 1 MG X 42 tablet, Take 0.5 mg one daily on days 1-3 and and 0.5 mg twice daily on days 4-7.Then 1 mg twice daily for a total of 12 weeks., Disp: 53 tablet, Rfl: 0      The following portions of the patient's history were reviewed and updated as appropriate: allergies, current medications, past family history, past medical history, past social history, past surgical history and problem list.    Social History     Tobacco Use   • Smoking status: Current Every Day Smoker     Packs/day: 1.00     Years: 54.00     Pack years: 54.00     Types: Cigarettes     Start date: 1961   • Smokeless tobacco: Never Used   Substance Use Topics   • Alcohol use: No   • Drug use: No     Review of Systems   Constitutional: Negative for fever.   Cardiovascular: Positive for leg swelling. Negative for chest pain and palpitations.   Respiratory: Negative for cough and shortness of breath.      Objective   Vitals:    05/21/21 1118   BP: 108/64   Pulse: 64   Resp: 16   Temp: 98.2 °F (36.8 °C)   Weight: 60.6 kg (133 lb 9.6 oz)   Height: 160 cm (63\")     Body mass index is 23.67 kg/m².    Constitutional:       Appearance: Well-developed.   Eyes:      Pupils: Pupils are equal, round, and reactive to light.   Neck:      Thyroid: No thyromegaly.      Vascular: No JVD.      Trachea: No tracheal deviation.      Lymphadenopathy: No cervical adenopathy.   Pulmonary:      Effort: Pulmonary effort is normal.      Breath sounds: Normal breath sounds.   Cardiovascular:      Normal rate. Regular rhythm.      No gallop.   Pulses:     Carotid: 2+ bilaterally.     Radial: 2+ bilaterally.     Dorsalis pedis: 1+ on the left side and 2+ on the right side.     Posterior tibial: 1+ on the left side and 2+ on the right side.  Edema:     Pretibial: bilateral 2+ edema of the pretibial area.     Ankle: bilateral 2+ edema of the ankle.     Feet: bilateral 2+ edema of the feet.  Abdominal:      General: " Bowel sounds are normal.      Palpations: Abdomen is soft. There is no abdominal mass.      Tenderness: There is no abdominal tenderness.   Musculoskeletal: Normal range of motion.      Cervical back: Neck supple. Skin:     General: Skin is warm and dry.      Findings: No rash.      Comments: Has a superficial ulcer on the lateral aspect of the right leg with dry surface.   Neurological:      Mental Status: Alert and oriented to person, place, and time.       Lab Results   Component Value Date     05/06/2021    K 4.2 05/06/2021     05/06/2021    CO2 29.9 (H) 05/06/2021    BUN 32 (H) 05/06/2021    CREATININE 1.37 (H) 05/06/2021    GLUCOSE 159 (H) 05/06/2021    CALCIUM 9.3 05/06/2021    AST 28 02/08/2021    ALT 33 (H) 02/08/2021    ALKPHOS 65 02/08/2021    LABIL2 2.0 02/08/2021     Lab Results   Component Value Date    CKTOTAL 60 01/02/2016     Lab Results   Component Value Date    WBC 11.54 (H) 12/16/2020    HGB 13.4 12/16/2020    HCT 43.4 12/16/2020     12/16/2020     Lab Results   Component Value Date    INR 0.99 06/19/2018    INR <0.90 01/02/2016     Lab Results   Component Value Date    MG 2.2 01/02/2016     Lab Results   Component Value Date    TSH 2.355 01/02/2016    CHLPL 146 01/02/2016    TRIG 105 10/13/2016    HDL 52 (L) 10/13/2016    LDL 57 10/13/2016      Lab Results   Component Value Date    .0 (H) 06/19/2018         ECG 12 Lead    Date/Time: 5/21/2021 11:21 AM  Performed by: Salvador Mccarthy MD  Authorized by: Salvador Mccarthy MD   Comparison: compared with previous ECG from 6/27/2019  Comparison to previous ECG: Patient was in sinus rhythm and ventricular paced rhythm on the previous EKG.  Comments: Atrial paced rhythm at baseline with normal AV conduction.  Appropriate AV sequential pacing with magnet application.                  Assessment/Plan :   Diagnosis Plan   1. Bilateral leg edema  US Venous Doppler Lower Extremity Bilateral (duplex)   2.  (ASCVD), s/p stenting in  1999, after an MI, repeat stenting in 2005 of the LAD and RCA at [facility] and stenting of the first OM branch of left circumflex on 6/26/2019.Clinically stable.     3. Peripheral arterial disease, status post previous interventions to both femoral arteries and femorofemoral bypass graft surgery.  US Arterial Doppler Lower Extremity Right   4. Acute kidney injury. Basic Metabolic Panel       Recommendations:  1. I reemphasized to her about discontinuing the diuretics (Bumex).  2. We will obtain venous Doppler of the lower extremities to rule out DVT.  3. I told her to keep the legs propped up.  4. We will obtain arterial Doppler scan of the right lower extremity for her PAD with right leg ulcer.  5. Check BMP    Return in about 3 weeks (around 6/11/2021).    As always, Dr. Reynolds  I appreciate very much the opportunity to participate in the cardiovascular care of your patients. Please do not hesitate to call me with any questions with regards to Sirisha Valdez's evaluation and management.       With Best Regards,        Salvador Mccarthy MD, FACC    Please note that portions of this note were completed with a voice recognition program.

## 2021-05-26 ENCOUNTER — TREATMENT (OUTPATIENT)
Dept: CARDIOLOGY | Facility: CLINIC | Age: 76
End: 2021-05-26

## 2021-05-26 DIAGNOSIS — I49.5 SSS (SICK SINUS SYNDROME) (HCC): Primary | ICD-10-CM

## 2021-05-26 PROCEDURE — 93294 REM INTERROG EVL PM/LDLS PM: CPT | Performed by: INTERNAL MEDICINE

## 2021-05-26 PROCEDURE — 93296 REM INTERROG EVL PM/IDS: CPT | Performed by: INTERNAL MEDICINE

## 2021-05-28 ENCOUNTER — TRANSCRIBE ORDERS (OUTPATIENT)
Dept: ADMINISTRATIVE | Facility: HOSPITAL | Age: 76
End: 2021-05-28

## 2021-05-28 DIAGNOSIS — N17.9 ACUTE RENAL FAILURE, UNSPECIFIED ACUTE RENAL FAILURE TYPE (HCC): Primary | ICD-10-CM

## 2021-06-04 ENCOUNTER — HOSPITAL ENCOUNTER (OUTPATIENT)
Dept: CARDIOLOGY | Facility: HOSPITAL | Age: 76
Discharge: HOME | End: 2021-06-04

## 2021-06-04 ENCOUNTER — HOSPITAL ENCOUNTER (OUTPATIENT)
Dept: ULTRASOUND IMAGING | Facility: HOSPITAL | Age: 76
Discharge: HOME | End: 2021-06-04

## 2021-06-04 DIAGNOSIS — I73.9 PERIPHERAL ARTERIAL DISEASE (HCC): ICD-10-CM

## 2021-06-04 DIAGNOSIS — N17.9 ACUTE RENAL FAILURE, UNSPECIFIED ACUTE RENAL FAILURE TYPE (HCC): ICD-10-CM

## 2021-06-04 DIAGNOSIS — R60.0 BILATERAL LEG EDEMA: ICD-10-CM

## 2021-06-04 PROCEDURE — 93970 EXTREMITY STUDY: CPT

## 2021-06-04 PROCEDURE — 93970 EXTREMITY STUDY: CPT | Performed by: RADIOLOGY

## 2021-06-04 PROCEDURE — 76775 US EXAM ABDO BACK WALL LIM: CPT | Performed by: RADIOLOGY

## 2021-06-04 PROCEDURE — 76775 US EXAM ABDO BACK WALL LIM: CPT

## 2021-06-04 PROCEDURE — 93926 LOWER EXTREMITY STUDY: CPT | Performed by: RADIOLOGY

## 2021-06-04 PROCEDURE — 93926 LOWER EXTREMITY STUDY: CPT

## 2021-06-07 ENCOUNTER — TELEPHONE (OUTPATIENT)
Dept: CARDIOLOGY | Facility: CLINIC | Age: 76
End: 2021-06-07

## 2021-06-07 NOTE — TELEPHONE ENCOUNTER
Patient wants to know if someone can call her back and go over her test results with her. She says she has my chart but is not sure about the results.     Thanks!

## 2021-06-08 DIAGNOSIS — I73.9 PERIPHERAL ARTERIAL DISEASE (HCC): Primary | ICD-10-CM

## 2021-06-10 ENCOUNTER — CLINICAL SUPPORT (OUTPATIENT)
Dept: CARDIOLOGY | Facility: CLINIC | Age: 76
End: 2021-06-10

## 2021-06-10 DIAGNOSIS — I49.5 SSS (SICK SINUS SYNDROME) (HCC): Primary | ICD-10-CM

## 2021-06-10 PROCEDURE — 93280 PM DEVICE PROGR EVAL DUAL: CPT | Performed by: INTERNAL MEDICINE

## 2021-06-15 ENCOUNTER — OFFICE VISIT (OUTPATIENT)
Dept: CARDIAC SURGERY | Facility: CLINIC | Age: 76
End: 2021-06-15

## 2021-06-15 VITALS
HEART RATE: 76 BPM | OXYGEN SATURATION: 98 % | SYSTOLIC BLOOD PRESSURE: 115 MMHG | BODY MASS INDEX: 22.61 KG/M2 | DIASTOLIC BLOOD PRESSURE: 58 MMHG | TEMPERATURE: 98 F | HEIGHT: 63 IN | WEIGHT: 127.6 LBS

## 2021-06-15 DIAGNOSIS — I73.9 PVD (PERIPHERAL VASCULAR DISEASE) (HCC): Primary | ICD-10-CM

## 2021-06-15 PROCEDURE — 99202 OFFICE O/P NEW SF 15 MIN: CPT | Performed by: THORACIC SURGERY (CARDIOTHORACIC VASCULAR SURGERY)

## 2021-06-15 NOTE — PROGRESS NOTES
06/15/2021  Patient Information  Sirisha Henderson                                                                                          [address]  [place] [address]   1945  'PCP/Referring Physician'  Yasmine Reynolds MD  [phone]  Jose G Contreras PA-C  [phone]  Chief Complaint   Patient presents with   • Peripheral Vascular Disease     Referred by Jose G Contreras PA-C for peripheral vascular disease. Patient has bilateral claudication- right leg worse than left        History of Present Illness: 75-year-old  female with a history of multiple medical comorbidities including hypertension, hyperlipidemia, coronary artery disease status post stenting, congestive heart failure, bilateral breast cancer status post resection, chronic kidney disease, active tobacco abuse and peripheral vascular disease status post femoral-femoral bypass at [facility] who presents with bilateral lower extremity pain.  For the past 6 months, the patient notes worsening bilateral symmetric lower extremity pain that is present down the entire leg.  This pain is exacerbated with ambulation and alleviated with rest.  The patient denies any rest pain, nocturnal pain, erythema or ulcerations.  On further questioning, the patient had 2 recent falls due to lower extremity weakness.      Patient Active Problem List   Diagnosis   •  (ASCVD), s/p stenting in 1999, after an MI, repeat stenting in 2005 of the LAD and RCA at [facility] and stenting of the first OM branch of left circumflex on 6/26/2019.Clinically stable.   • SSS (sick sinus syndrome), s/p permanent pacemaker implantion,    • Palpitations   • Dizziness   • Dyslipidemia, currently on rosuvastatin 40 mg daily   • Essential hypertension, blood pressure is running low associated with fatigue.   • PVD (peripheral vascular disease) (CMS/HCC)   • Swelling of both lower extremities   • Chronic left shoulder pain   • S/P arthroscopy of shoulder   •  Coagulopathy (CMS/HCC)   • Bilateral malignant neoplasm of breast in female, estrogen receptor positive (CMS/HCC)   • Tobacco abuse   • Aromatase inhibitor use   • Osteopenia   • Abnormal EKG   • Abnormal nuclear stress test   • Collagenous colitis   • Pacemaker battery depletion     Past Medical History:   Diagnosis Date   • Anxiety    • Breast cancer (CMS/HCC) 11/28/2017    bilateral   • CHF (congestive heart failure) (CMS/HCC)    • CKD (chronic kidney disease)    • Coronary artery disease     3 HEART STENTS   • Deep vein thrombosis (CMS/HCC)     right lower extremity   • Depression    • Disease of thyroid gland    • Gastroesophageal reflux    • Glaucoma    • Hyperlipidemia    • Hypertension    • MI (myocardial infarction) (CMS/HCC)    • Migraine    • Peripheral arterial disease (CMS/HCC)     KIM LEG STENTS       ANDFEM POP   • Peripheral vascular disease (CMS/HCC)    • SSS (sick sinus syndrome) (CMS/HCC)    • Status post placement of cardiac pacemaker    • Wears dentures    • Wears glasses      Past Surgical History:   Procedure Laterality Date   • BREAST LUMPECTOMY Right 12/12/2017    Procedure: BREAST LUMPECTOMY WITH NEEDLE LOCALIZATION;  Surgeon: Lida Babin MD;  Location: [facility];  Service:    • BREAST LUMPECTOMY Bilateral 12/27/2017    Procedure: right breast re-excision  left breast re-excision with 3 cm inferior and superior advancement flap;  Surgeon: Lida Babin MD;  Location: [facility];  Service:    • BREAST LUMPECTOMY WITH SENTINEL NODE BIOPSY AND AXILLARY NODE DISSECTION Left 12/12/2017    Procedure: Left BREAST LUMPECTOMY WITH SENTINEL NODE BIOPSY  Isotope and Lymphazurin injection left breast. Left breast preoperative needle localization;  Surgeon: Lida Babin MD;  Location: [facility];  Service:    • BREAST SURGERY      Mass Removal.   • CARDIAC CATHETERIZATION     • CARDIAC CATHETERIZATION N/A 6/26/2019    Procedure: Left Heart Cath;  Surgeon: Salvador Mccarthy MD;  Location:   [facility];  Service: Cardiology   • CARDIAC ELECTROPHYSIOLOGY PROCEDURE Left 12/16/2020    Procedure: PPM generator change - dual;  Surgeon: Salvador Mccarthy MD;  Location:  [facility];  Service: Cardiology;  Laterality: Left;   • CARPAL TUNNEL RELEASE     • CATARACT EXTRACTION Bilateral    • FEMORAL DISTAL BYPASS     • FEMORAL FEMORAL BYPASS     • HIP SURGERY Right    • HYSTERECTOMY     • PACEMAKER IMPLANTATION     • TX RT/LT HEART CATHETERS N/A 6/26/2019    Procedure: Percutaneous Coronary Intervention;  Surgeon: Gus Suero MD;  Location:  [facility];  Service: Cardiology   • SHOULDER ARTHROSCOPY W/ ROTATOR CUFF REPAIR Left 3/8/2017    Procedure: LEFT SHOULDER ARTHROSCOPY, ACROMIOPLASTY, SUBACROMIC DEBRIDMENT;  Surgeon: Tesfaey Polo MD;  Location: [facility];  Service:    • SHOULDER SURGERY         Current Outpatient Medications:   •  anastrozole (ARIMIDEX) 1 MG tablet, Take 1 mg by mouth Daily., Disp: , Rfl:   •  aspirin 81 MG EC tablet, Take 81 mg by mouth daily., Disp: , Rfl:   •  BUDESONIDE ER PO, Take 9 mg by mouth Every Morning., Disp: , Rfl:   •  busPIRone (BUSPAR) 30 MG tablet, Take 15 mg by mouth 2 (Two) Times a Day As Needed (anxiety)., Disp: , Rfl:   •  diazePAM (VALIUM) 10 MG tablet, Take 10 mg by mouth 2 (Two) Times a Day As Needed for Anxiety., Disp: , Rfl:   •  HYDROcodone-acetaminophen (NORCO) 7.5-325 MG per tablet, Take 1 tablet by mouth Every 6 (Six) Hours As Needed for Moderate Pain ., Disp: , Rfl:   •  levothyroxine (SYNTHROID, LEVOTHROID) 25 MCG tablet, , Disp: , Rfl:   •  metoprolol tartrate (LOPRESSOR) 25 MG tablet, Take 1 tablet by mouth 2 (Two) Times a Day., Disp: 180 tablet, Rfl: 2  •  Multiple Vitamins-Minerals (MULTIVITAL PO), Take  by mouth., Disp: , Rfl:   •  nitroglycerin (NITROSTAT) 0.4 MG SL tablet, Place 0.4 mg under the tongue Every 5 (Five) Minutes As Needed for Chest Pain. Take no more than 3 doses in 15  minutes., Disp: , Rfl:   •  Omega-3 Fatty Acids (FISH OIL) 1000 MG capsule capsule, Take 1,000 mg by mouth Daily With Breakfast., Disp: , Rfl:   •  potassium chloride 10 MEQ CR tablet, Take 1 tablet by mouth Daily., Disp: 30 tablet, Rfl: 3  •  rosuvastatin (CRESTOR) 40 MG tablet, Take 1 tablet by mouth Every Night., Disp: 90 tablet, Rfl: 2  •  anastrozole (ARIMIDEX) 1 MG tablet, Take 1 tablet by mouth Daily for 30 days., Disp: 30 tablet, Rfl: 5  •  anastrozole (ARIMIDEX) 1 MG tablet, Take 1 tablet by mouth Daily for 30 days., Disp: 30 tablet, Rfl: 11  •  varenicline (Chantix Starting Month Pak) 0.5 MG X 11 & 1 MG X 42 tablet, Take 0.5 mg one daily on days 1-3 and and 0.5 mg twice daily on days 4-7.Then 1 mg twice daily for a total of 12 weeks., Disp: 53 tablet, Rfl: 0  Allergies   Allergen Reactions   • Promethazine      hypotension     Social History     Socioeconomic History   • Marital status:      Spouse name: Not on file   • Number of children: 4   • Years of education: Not on file   • Highest education level: Not on file   Tobacco Use   • Smoking status: Current Every Day Smoker     Packs/day: 1.00     Years: 54.00     Pack years: 54.00     Types: Cigarettes     Start date: 1961   • Smokeless tobacco: Never Used   Vaping Use   • Vaping Use: Never used   Substance and Sexual Activity   • Alcohol use: No   • Drug use: No   • Sexual activity: Defer     Family History   Problem Relation Age of Onset   • Asthma Father    • Cancer Brother    • No Known Problems Mother    • Breast cancer Neg Hx      Review of Systems   Constitutional: Positive for malaise/fatigue and weight loss (45 lbs within 2 years). Negative for chills, fever and night sweats.   HENT: Negative for hearing loss, odynophagia and sore throat.    Cardiovascular: Positive for chest pain, claudication, dyspnea on exertion and leg swelling. Negative for orthopnea and palpitations.   Respiratory: Positive for shortness of breath and wheezing.  "Negative for cough and hemoptysis.    Endocrine: Negative for cold intolerance, heat intolerance, polydipsia, polyphagia and polyuria.   Hematologic/Lymphatic: Bruises/bleeds easily.   Skin: Positive for dry skin. Negative for itching and rash.   Musculoskeletal: Positive for back pain, falls, joint pain and muscle cramps. Negative for joint swelling and myalgias.   Gastrointestinal: Positive for diarrhea (chronic). Negative for abdominal pain, constipation, hematemesis, hematochezia, melena, nausea and vomiting.   Genitourinary: Negative for dysuria, frequency and hematuria.   Neurological: Positive for dizziness, light-headedness and loss of balance. Negative for focal weakness, headaches, numbness and seizures.   Psychiatric/Behavioral: Positive for depression. Negative for suicidal ideas. The patient is nervous/anxious.    All other systems reviewed and are negative.    Vitals:    06/15/21 1215   BP: 115/58   BP Location: Right arm   Patient Position: Sitting   Pulse: 76   Temp: 98 °F (36.7 °C)   SpO2: 98%   Weight: 57.9 kg (127 lb 9.6 oz)   Height: 160 cm (63\")      Physical Exam  Vitals reviewed.   Constitutional:       General: She is not in acute distress.     Appearance: She is well-developed. She is not diaphoretic.      Comments:  female who appears stated age and is present with her daughter   HENT:      Head: Normocephalic and atraumatic.      Nose: Nose normal.   Eyes:      General: No scleral icterus.     Conjunctiva/sclera: Conjunctivae normal.   Neck:      Vascular: No JVD.      Trachea: No tracheal deviation.   Cardiovascular:      Rate and Rhythm: Normal rate and regular rhythm.      Pulses:           Femoral pulses are 0 on the right side and 0 on the left side.       Popliteal pulses are 0 on the right side and 0 on the left side.        Dorsalis pedis pulses are detected w/ Doppler on the right side and detected w/ Doppler on the left side.        Posterior tibial pulses are detected " w/ Doppler on the right side and detected w/ Doppler on the left side.      Heart sounds: Normal heart sounds. No murmur heard.   No friction rub. No gallop.       Comments: Left greater than right dorsalis pedis and posterior tibial doppler signals present.    Pulmonary:      Effort: Pulmonary effort is normal. No respiratory distress.      Breath sounds: Normal breath sounds. No wheezing or rales.   Abdominal:      General: There is no distension.      Palpations: Abdomen is soft. There is no mass.      Tenderness: There is no abdominal tenderness. There is no guarding or rebound.   Musculoskeletal:         General: Normal range of motion.      Cervical back: Neck supple.      Right lower leg: No edema.      Left lower leg: No edema.   Skin:     General: Skin is warm and dry.      Findings: No erythema or rash.      Comments: No pedal erythema or ulcerations.  Diffuse ecchymoses on the upper and lower extremities.     Neurological:      General: No focal deficit present.      Mental Status: She is alert and oriented to person, place, and time.      Comments: Intact pedal motor function and sensation   Psychiatric:         Mood and Affect: Mood normal.         Behavior: Behavior normal.         Thought Content: Thought content normal.         Judgment: Judgment normal.       The ROS, past medical history, surgical history, family history, social history and vitals were reviewed by myself and corrected as needed.      Labs/Imaging:  -Arterial duplex performed 6/4/2021, personally reviewed, demonstrates occlusion of femoral-femoral bypass.  Monophasic flow is present down the right lower extremity from the distal external iliac down to the posterior tibial artery.  Retrograde flow is present in the profunda femoris.  Biphasic waveforms were present down the entire left lower extremity.  -Venous duplex performed 6/4/2021, demonstrates no evidence of DVT.    Assessment/Plan:  75-year-old  female with a history  of multiple medical comorbidities including hypertension, hyperlipidemia, coronary artery disease status post stenting, congestive heart failure, bilateral breast cancer status post resection, chronic kidney disease, active tobacco abuse and peripheral vascular disease status [facility] who presents with bilateral lower extremity pain.  The patient has significant peripheral vascular disease with bilateral lower extremity claudication.  We discussed the importance of smoking cessation given her occluded femoral femoral bypass graft.  I recommended a structured walking program.  She is not a candidate for Pletal given her congestive heart failure.  I will have the patient return to clinic in 3 months to evaluate her progress with medical management.  If she has worsening symptoms, a CTA of the aorta with lower extremity runoff will be need to better evaluate her vasculature.  She is a suboptimal surgical candidate given her comorbidities and continued tobacco abuse.  Continue lifelong aspirin, statin and beta blocker.      Patient Active Problem List   Diagnosis   •  (ASCVD), s/p stenting in 1999, after an MI, repeat stenting in 2005 of the LAD and RCA at [facility] and stenting of the first OM branch of left circumflex on 6/26/2019.Clinically stable.   • SSS (sick sinus syndrome), s/p permanent pacemaker implantion,    • Palpitations   • Dizziness   • Dyslipidemia, currently on rosuvastatin 40 mg daily   • Essential hypertension, blood pressure is running low associated with fatigue.   • PVD (peripheral vascular disease) (CMS/HCC)   • Swelling of both lower extremities   • Chronic left shoulder pain   • S/P arthroscopy of shoulder   • Coagulopathy (CMS/HCC)   • Bilateral malignant neoplasm of breast in female, estrogen receptor positive (CMS/HCC)   • Tobacco abuse   • Aromatase inhibitor use   • Osteopenia   • Abnormal EKG   • Abnormal nuclear stress test   • Collagenous  colitis   • Pacemaker battery depletion

## 2021-07-08 ENCOUNTER — TELEPHONE (OUTPATIENT)
Dept: CARDIOLOGY | Facility: CLINIC | Age: 76
End: 2021-07-08

## 2021-07-08 NOTE — TELEPHONE ENCOUNTER
Called pt and advised her they will go over the results with her at her follow up on the 21st. She expressed understanding and stated that she has been getting real dizzy when she gets ups and has been falling a lot. I advised her to contact her PCP.

## 2021-07-21 ENCOUNTER — OFFICE VISIT (OUTPATIENT)
Dept: CARDIOLOGY | Facility: CLINIC | Age: 76
End: 2021-07-21

## 2021-07-21 VITALS
DIASTOLIC BLOOD PRESSURE: 62 MMHG | HEART RATE: 76 BPM | BODY MASS INDEX: 23.88 KG/M2 | SYSTOLIC BLOOD PRESSURE: 130 MMHG | HEIGHT: 63 IN | WEIGHT: 134.8 LBS | TEMPERATURE: 98.2 F

## 2021-07-21 DIAGNOSIS — I25.10 ARTERIOSCLEROTIC CARDIOVASCULAR DISEASE (ASCVD): Primary | ICD-10-CM

## 2021-07-21 DIAGNOSIS — E78.5 DYSLIPIDEMIA: ICD-10-CM

## 2021-07-21 DIAGNOSIS — I73.9 PERIPHERAL ARTERIAL DISEASE (HCC): ICD-10-CM

## 2021-07-21 DIAGNOSIS — N18.31 STAGE 3A CHRONIC KIDNEY DISEASE (HCC): ICD-10-CM

## 2021-07-21 DIAGNOSIS — I10 ESSENTIAL HYPERTENSION: ICD-10-CM

## 2021-07-21 DIAGNOSIS — Z72.0 TOBACCO ABUSE: ICD-10-CM

## 2021-07-21 DIAGNOSIS — R06.09 DYSPNEA ON EXERTION: ICD-10-CM

## 2021-07-21 PROCEDURE — 99214 OFFICE O/P EST MOD 30 MIN: CPT | Performed by: INTERNAL MEDICINE

## 2021-07-21 NOTE — PROGRESS NOTES
[facility]    PATIENT'S NAME: Ana Negro JR   ATTENDING PHYSICIAN: Consuelo Roberts DO   CONSULTING PHYSICIAN: Mitzy Bansal.  Breanna Wood MD   PATIENT ACCOUNT#:   [id number]    LOCATION:  [address] [place] [address] [place] #:   [id number]       DATE OF BIRTH Yasmine Reynolds MD  Sirisha Valdez  1945  07/21/2021    Patient Active Problem List   Diagnosis   •  (ASCVD), s/p stenting in 1999, after an MI, repeat stenting in 2005 of the LAD and RCA at [facility] and stenting of the first OM branch of left circumflex on 6/26/2019.Clinically stable.   • SSS (sick sinus syndrome), s/p permanent pacemaker implantion,    • Palpitations   • Dizziness   • Dyslipidemia, currently on rosuvastatin 40 mg daily   • Essential hypertension, blood pressure is running low associated with fatigue.   • PVD (peripheral vascular disease) (CMS/HCC)   • Swelling of both lower extremities   • Chronic left shoulder pain   • S/P arthroscopy of shoulder   • Coagulopathy (CMS/HCC)   • Bilateral malignant neoplasm of breast in female, estrogen receptor positive (CMS/HCC)   • Tobacco abuse   • Aromatase inhibitor use   • Osteopenia   • Abnormal EKG   • Abnormal nuclear stress test   • Collagenous colitis   • Pacemaker battery depletion       Dear Yasmine Reynolds MD:    Subjective     Sirisha Valdez is a 75 y.o. female with the problems as listed above, presents    Chief complaint: Follow-up of coronary artery disease and peripheral arterial disease.    History of Present Illness:  is a pleasant 72-year-old  female with history of known ASCVD, status post stenting of the LAD and RCA in 2005 and stenting of the first obtuse marginal branch of the left circumflex in June 2019, severe peripheral arterial disease, is here for regular cardiology follow-up.  On today's visit she denies any complaints of chest pains.  She denies any leg claudication.  She was recently seen by Dr. Esposito on 6/15/2021.  She was recommended to continue with medical therapy and smoking cessation.  She has dyspnea with mild to moderate exertion with no PND, orthopnea or pedal edema.    Allergies   Allergen Reactions   • Promethazine      hypotension       Current Outpatient Medications:    •  anastrozole (ARIMIDEX) 1 MG tablet, Take 1 tablet by mouth Daily for 30 days., Disp: 30 tablet, Rfl: 5  •  anastrozole (ARIMIDEX) 1 MG tablet, Take 1 tablet by mouth Daily for 30 days., Disp: 30 tablet, Rfl: 11  •  anastrozole (ARIMIDEX) 1 MG tablet, Take 1 mg by mouth Daily., Disp: , Rfl:   •  aspirin 81 MG EC tablet, Take 81 mg by mouth daily., Disp: , Rfl:   •  BUDESONIDE ER PO, Take 9 mg by mouth Every Morning., Disp: , Rfl:   •  busPIRone (BUSPAR) 30 MG tablet, Take 15 mg by mouth 2 (Two) Times a Day As Needed (anxiety)., Disp: , Rfl:   •  diazePAM (VALIUM) 10 MG tablet, Take 10 mg by mouth 2 (Two) Times a Day As Needed for Anxiety., Disp: , Rfl:   •  HYDROcodone-acetaminophen (NORCO) 7.5-325 MG per tablet, Take 1 tablet by mouth Every 6 (Six) Hours As Needed for Moderate Pain ., Disp: , Rfl:   •  levothyroxine (SYNTHROID, LEVOTHROID) 25 MCG tablet, , Disp: , Rfl:   •  metoprolol tartrate (LOPRESSOR) 25 MG tablet, Take 1 tablet by mouth 2 (Two) Times a Day., Disp: 180 tablet, Rfl: 2  •  Multiple Vitamins-Minerals (MULTIVITAL PO), Take  by mouth., Disp: , Rfl:   •  nitroglycerin (NITROSTAT) 0.4 MG SL tablet, Place 0.4 mg under the tongue Every 5 (Five) Minutes As Needed for Chest Pain. Take no more than 3 doses in 15 minutes., Disp: , Rfl:   •  Omega-3 Fatty Acids (FISH OIL) 1000 MG capsule capsule, Take 1,000 mg by mouth Daily With Breakfast., Disp: , Rfl:   •  potassium chloride 10 MEQ CR tablet, Take 1 tablet by mouth Daily., Disp: 30 tablet, Rfl: 3  •  rosuvastatin (CRESTOR) 40 MG tablet, Take 1 tablet by mouth Every Night., Disp: 90 tablet, Rfl: 2  •  varenicline (Chantix Starting Month Pak) 0.5 MG X 11 & 1 MG X 42 tablet, Take 0.5 mg one daily on days 1-3 and and 0.5 mg twice daily on days 4-7.Then 1 mg twice daily for a total of 12 weeks., Disp: 53 tablet, Rfl: 0    The following portions of the patient's history were reviewed and updated as appropriate: allergies, current medications, past family  EXAMINATION:    GENERAL:  Well-developed well-nourished male in no acute distress. Alert and oriented x3. VITAL SIGNS:  Blood pressure 130/96, pulse 84 and regular, respirations 18 unlabored, afebrile. HEENT:  Unremarkable. NECK:  Supple.   Jugular veno "history, past medical history, past social history, past surgical history and problem list.    Social History     Tobacco Use   • Smoking status: Current Every Day Smoker     Packs/day: 1.00     Years: 54.00     Pack years: 54.00     Types: Cigarettes     Start date: 1961   • Smokeless tobacco: Never Used   Vaping Use   • Vaping Use: Never used   Substance Use Topics   • Alcohol use: No   • Drug use: No     Review of Systems   Constitutional: Negative for chills, fever, malaise/fatigue, weight gain and weight loss.   HENT: Negative for congestion, nosebleeds and sore throat.    Cardiovascular: Negative for chest pain, irregular heartbeat, leg swelling and orthopnea.   Respiratory: Negative for cough, hemoptysis, shortness of breath and wheezing.    Musculoskeletal: Positive for joint pain.   Gastrointestinal: Negative for abdominal pain, change in bowel habit, hematemesis, hematochezia, melena, nausea and vomiting.   Genitourinary: Negative for dysuria, frequency and hematuria.   Neurological: Negative for focal weakness, headaches, light-headedness and weakness.   Psychiatric/Behavioral: Negative.    Allergic/Immunologic: Negative.      Objective   Vitals:    07/21/21 1450   BP: 130/62   Pulse: 76   Temp: 98.2 °F (36.8 °C)   Weight: 61.1 kg (134 lb 12.8 oz)   Height: 160 cm (63\")     Body mass index is 23.88 kg/m².        Vitals reviewed.   Constitutional:       Appearance: Well-developed.   Eyes:      Conjunctiva/sclera: Conjunctivae normal.   HENT:      Head: Normocephalic.   Neck:      Thyroid: No thyromegaly.      Vascular: No JVD.      Trachea: No tracheal deviation.   Pulmonary:      Effort: No respiratory distress.      Breath sounds: Normal breath sounds. No wheezing. No rales.   Cardiovascular:      PMI at left midclavicular line. Normal rate. Regular rhythm. Normal S1. Normal S2.      Murmurs: There is no murmur.      No gallop. No click. No rub.   Pulses:     Carotid: 2+ bilaterally.     Radial: 2+ " 94:61:55  Job [id number]  IBW/ bilaterally.     Dorsalis pedis: 1+ bilaterally.     Posterior tibial: 1+ bilaterally.  Edema:     Peripheral edema absent.   Abdominal:      General: Bowel sounds are normal.      Palpations: Abdomen is soft. There is no abdominal mass.      Tenderness: There is no abdominal tenderness.   Musculoskeletal:      Cervical back: Normal range of motion and neck supple. Skin:     General: Skin is warm and dry.   Neurological:      Mental Status: Alert and oriented to person, place, and time.      Cranial Nerves: No cranial nerve deficit.         Lab Results   Component Value Date     05/06/2021    K 4.2 05/06/2021     05/06/2021    CO2 29.9 (H) 05/06/2021    BUN 32 (H) 05/06/2021    CREATININE 1.37 (H) 05/06/2021    GLUCOSE 159 (H) 05/06/2021    CALCIUM 9.3 05/06/2021    AST 28 02/08/2021    ALT 33 (H) 02/08/2021    ALKPHOS 65 02/08/2021    LABIL2 2.0 02/08/2021     Lab Results   Component Value Date    CKTOTAL 60 01/02/2016     Lab Results   Component Value Date    WBC 11.54 (H) 12/16/2020    HGB 13.4 12/16/2020    HCT 43.4 12/16/2020     12/16/2020     Lab Results   Component Value Date    INR 0.99 06/19/2018    INR <0.90 01/02/2016     Lab Results   Component Value Date    MG 2.2 01/02/2016     Lab Results   Component Value Date    TSH 2.355 01/02/2016    CHLPL 146 01/02/2016    TRIG 105 10/13/2016    HDL 52 (L) 10/13/2016    LDL 57 10/13/2016      Lab Results   Component Value Date    .0 (H) 06/19/2018       Assessment/Plan :   Diagnosis Plan   1.  (ASCVD), s/p stenting in 1999, after an MI, repeat stenting in 2005 of the LAD and RCA at [facility] and stenting of the first OM branch of left circumflex on 6/26/2019.Clinically stable.     2. Dyspnea on exertion  Adult Transthoracic Echo Complete   3. Peripheral arterial disease (CMS/HCC)     4. Stage 3a chronic kidney disease (CMS/HCC)     5. Tobacco abuse     6. Essential hypertension, controlled.     7. Dyslipidemia, currently on  rosuvastatin 40 mg daily            Recommendations:  1. We will obtain an echo Doppler study and for LV systolic function is acceptable then will restart Pletal for her peripheral arterial disease.  2. Continue with aspirin and rosuvastatin.  3. I also reemphasized strongly for her to quit smoking.  She said she has used Chantix which made her feel crazy and nicotine patches reportedly caused sores on her skin.    Return in about 6 weeks (around 9/1/2021).    As always,  I appreciate very much the opportunity to participate in the cardiovascular care of your patients. Please do not hesitate to call me with any questions with regards to Gurjitjossieprem STEVENSON José Miguel's evaluation and management.         With Best Regards,        Salvador Mccarthy MD, FACC    Please note that portions of this note were completed with a voice recognition program.

## 2021-07-26 ENCOUNTER — HOSPITAL ENCOUNTER (OUTPATIENT)
Dept: CARDIOLOGY | Facility: HOSPITAL | Age: 76
Discharge: HOME | End: 2021-07-26
Admitting: INTERNAL MEDICINE

## 2021-07-26 DIAGNOSIS — R06.09 DYSPNEA ON EXERTION: ICD-10-CM

## 2021-07-26 LAB
BH CV ECHO MEAS - % IVS THICK: 38.3 %
BH CV ECHO MEAS - % LVPW THICK: 42.4 %
BH CV ECHO MEAS - ACS: 1.9 CM
BH CV ECHO MEAS - AO MAX PG: 9.1 MMHG
BH CV ECHO MEAS - AO MEAN PG: 4 MMHG
BH CV ECHO MEAS - AO ROOT AREA (BSA CORRECTED): 1.7
BH CV ECHO MEAS - AO ROOT AREA: 6.2 CM^2
BH CV ECHO MEAS - AO ROOT DIAM: 2.8 CM
BH CV ECHO MEAS - AO V2 MAX: 151 CM/SEC
BH CV ECHO MEAS - AO V2 MEAN: 95.1 CM/SEC
BH CV ECHO MEAS - AO V2 VTI: 34.6 CM
BH CV ECHO MEAS - BSA(HAYCOCK): 1.7 M^2
BH CV ECHO MEAS - BSA: 1.6 M^2
BH CV ECHO MEAS - BZI_BMI: 23.7 KILOGRAMS/M^2
BH CV ECHO MEAS - BZI_METRIC_HEIGHT: 160 CM
BH CV ECHO MEAS - BZI_METRIC_WEIGHT: 60.8 KG
BH CV ECHO MEAS - EDV(CUBED): 95.1 ML
BH CV ECHO MEAS - EDV(MOD-SP4): 51 ML
BH CV ECHO MEAS - EDV(TEICH): 95.6 ML
BH CV ECHO MEAS - EF(CUBED): 61.5 %
BH CV ECHO MEAS - EF(MOD-SP4): 71.8 %
BH CV ECHO MEAS - EF(TEICH): 53.2 %
BH CV ECHO MEAS - ESV(CUBED): 36.6 ML
BH CV ECHO MEAS - ESV(MOD-SP4): 14.4 ML
BH CV ECHO MEAS - ESV(TEICH): 44.8 ML
BH CV ECHO MEAS - FS: 27.3 %
BH CV ECHO MEAS - IVS/LVPW: 1.1
BH CV ECHO MEAS - IVSD: 1.3 CM
BH CV ECHO MEAS - IVSS: 1.8 CM
BH CV ECHO MEAS - LA DIMENSION: 2.4 CM
BH CV ECHO MEAS - LA/AO: 0.86
BH CV ECHO MEAS - LV DIASTOLIC VOL/BSA (35-75): 31.3 ML/M^2
BH CV ECHO MEAS - LV MASS(C)D: 214.2 GRAMS
BH CV ECHO MEAS - LV MASS(C)DI: 131.3 GRAMS/M^2
BH CV ECHO MEAS - LV MASS(C)S: 234.1 GRAMS
BH CV ECHO MEAS - LV MASS(C)SI: 143.5 GRAMS/M^2
BH CV ECHO MEAS - LV SYSTOLIC VOL/BSA (12-30): 8.8 ML/M^2
BH CV ECHO MEAS - LVIDD: 4.6 CM
BH CV ECHO MEAS - LVIDS: 3.3 CM
BH CV ECHO MEAS - LVLD AP4: 5.9 CM
BH CV ECHO MEAS - LVLS AP4: 4.7 CM
BH CV ECHO MEAS - LVOT AREA (M): 2.5 CM^2
BH CV ECHO MEAS - LVOT AREA: 2.5 CM^2
BH CV ECHO MEAS - LVOT DIAM: 1.8 CM
BH CV ECHO MEAS - LVPWD: 1.2 CM
BH CV ECHO MEAS - LVPWS: 1.7 CM
BH CV ECHO MEAS - MV A MAX VEL: 101 CM/SEC
BH CV ECHO MEAS - MV E MAX VEL: 84.8 CM/SEC
BH CV ECHO MEAS - MV E/A: 0.84
BH CV ECHO MEAS - PA ACC TIME: 0.12 SEC
BH CV ECHO MEAS - PA PR(ACCEL): 25 MMHG
BH CV ECHO MEAS - RAP SYSTOLE: 10 MMHG
BH CV ECHO MEAS - RVSP: 43.3 MMHG
BH CV ECHO MEAS - SI(AO): 130.6 ML/M^2
BH CV ECHO MEAS - SI(CUBED): 35.9 ML/M^2
BH CV ECHO MEAS - SI(MOD-SP4): 22.4 ML/M^2
BH CV ECHO MEAS - SI(TEICH): 31.2 ML/M^2
BH CV ECHO MEAS - SV(AO): 213.1 ML
BH CV ECHO MEAS - SV(CUBED): 58.5 ML
BH CV ECHO MEAS - SV(MOD-SP4): 36.6 ML
BH CV ECHO MEAS - SV(TEICH): 50.8 ML
BH CV ECHO MEAS - TR MAX VEL: 288.3 CM/SEC
MAXIMAL PREDICTED HEART RATE: 147 BPM
STRESS TARGET HR: 125 BPM

## 2021-07-26 PROCEDURE — 93306 TTE W/DOPPLER COMPLETE: CPT | Performed by: INTERNAL MEDICINE

## 2021-07-26 PROCEDURE — 93306 TTE W/DOPPLER COMPLETE: CPT

## 2021-08-02 ENCOUNTER — TELEPHONE (OUTPATIENT)
Dept: CARDIOLOGY | Facility: CLINIC | Age: 76
End: 2021-08-02

## 2021-08-03 NOTE — TELEPHONE ENCOUNTER
Patient not actually calling about swelling but rather test results.  Patient advised that results would be discussed at follow up.  Patient's [facility] on [facility] and started asking questions and she was advised all question would be answered at follow up.  If there was anything major wrong we would have contacted her already.   They demanded an earlier appt.  Patient moved to 08/09

## 2021-08-09 ENCOUNTER — OFFICE VISIT (OUTPATIENT)
Dept: CARDIOLOGY | Facility: CLINIC | Age: 76
End: 2021-08-09

## 2021-08-09 VITALS
SYSTOLIC BLOOD PRESSURE: 140 MMHG | BODY MASS INDEX: 24.1 KG/M2 | TEMPERATURE: 97.3 F | HEART RATE: 63 BPM | HEIGHT: 63 IN | OXYGEN SATURATION: 96 % | DIASTOLIC BLOOD PRESSURE: 54 MMHG | WEIGHT: 136 LBS

## 2021-08-09 DIAGNOSIS — R60.0 BILATERAL LEG EDEMA: ICD-10-CM

## 2021-08-09 DIAGNOSIS — I73.9 PERIPHERAL ARTERIAL DISEASE (HCC): ICD-10-CM

## 2021-08-09 DIAGNOSIS — I25.10 ARTERIOSCLEROTIC CARDIOVASCULAR DISEASE (ASCVD): Primary | ICD-10-CM

## 2021-08-09 DIAGNOSIS — I49.5 SSS (SICK SINUS SYNDROME) (HCC): ICD-10-CM

## 2021-08-09 PROBLEM — N18.32 STAGE 3B CHRONIC KIDNEY DISEASE: Status: ACTIVE | Noted: 2021-08-09

## 2021-08-09 PROCEDURE — 99214 OFFICE O/P EST MOD 30 MIN: CPT | Performed by: INTERNAL MEDICINE

## 2021-08-09 RX ORDER — BUMETANIDE 1 MG/1
1 TABLET ORAL DAILY
COMMUNITY
End: 2021-10-06

## 2021-08-09 RX ORDER — CILOSTAZOL 50 MG/1
50 TABLET ORAL 2 TIMES DAILY
Qty: 60 TABLET | Refills: 3 | Status: SHIPPED | OUTPATIENT
Start: 2021-08-09 | End: 2021-11-11 | Stop reason: SDUPTHER

## 2021-08-09 NOTE — PROGRESS NOTES
Yasmine Reynolds MD  Sirisha Valdez  1945  08/09/2021    Patient Active Problem List   Diagnosis   •  (ASCVD), s/p stenting in 1999, after an MI, repeat stenting in 2005 of the LAD and RCA at [facility] and stenting of the first OM branch of left circumflex on 6/26/2019.Clinically stable.   • SSS (sick sinus syndrome), s/p permanent pacemaker implantion,    • Palpitations   • Dizziness   • Dyslipidemia, currently on rosuvastatin 40 mg daily   • Essential hypertension, blood pressure is running low associated with fatigue.   • PVD (peripheral vascular disease) (CMS/HCC)   • Swelling of both lower extremities   • Chronic left shoulder pain   • S/P arthroscopy of shoulder   • Coagulopathy (CMS/HCC)   • Bilateral malignant neoplasm of breast in female, estrogen receptor positive (CMS/HCC)   • Tobacco abuse   • Aromatase inhibitor use   • Osteopenia   • Abnormal EKG   • Abnormal nuclear stress test   • Collagenous colitis   • Pacemaker battery depletion   • Stage 3b chronic kidney disease (CMS/HCC)       Dear Yasmine Reynolds MD:    Subjective     Sirisha Valdez is a 75 y.o. female with the problems as listed above, presents    Chief Complaint: Follow-up of coronary artery disease, peripheral artery disease and to review the recent echo Doppler study results.       History of Present Illness: Ms. Valdez is a pleasant 72-year-old  female with history of known coronary artery disease, status post stenting of the LAD  and RCA in 2005 and stenting of the OM branch of circumflex in June 2019, history of peripheral artery disease, is here for regular cardiology follow-up and to review the recent echo Doppler results.  Had a recent echo Doppler study revealed normal left lower chamber size, wall motion systolic function with an estimated ejection fraction about 65 to 70% with grade 1 diastolic noncompliance of the left ventricle.  She had some mild aortic valvular sclerosis with mild aortic  vegetation and mild tricuspid regurgitation with mild pulmonary hypertension.  On today's visit she denies any complains of chest pains.  She had 1 episode of brief syncope 2 days ago when she got up suddenly.  She has intermittent claudication in her lower extremities.  She has long history of smoking and continues to smoke 1 pack a day despite repeated advice against it.    Sirisha Valdez  Echo Complete w/Doppler and Color Flow  Order# [id number]  Reading physician: Salvador Mccarthy MD Ordering physician: Salvador Mccarthy MD Study date: 21   Patient Information    Patient Name   Sirisha Valdez MRN   [id number] Legal Sex   Female  (Age)   1945 (75 y.o.)   Interpretation Summary    · Normal left ventricular cavity size noted. Left ventricular wall thickness is consistent with mild concentric hypertrophy. All left ventricular wall segments contract normally.  · Left ventricular ejection fraction appears to be 66 - 70%.  · Left ventricular diastolic function is consistent with (grade I) impaired relaxation.  · The aortic valve is abnormal in structure. The aortic valve exhibits sclerosis. The aortic valve appears trileaflet. Mild aortic valve regurgitation is present. No aortic valve stenosis is present.  · There is moderate, posterior mitral leaflet thickening present. Trace mitral valve regurgitation is present. No significant mitral valve stenosis is present.  · Mild tricuspid valve regurgitation is present. Estimated right ventricular systolic pressure from tricuspid regurgitation is mildly elevated (35-45 mmHg). Mild pulmonary hypertension is present.  · There is no evidence of pericardial effusion.     US Venous Doppler Lower Extremity Bilateral (duplex) [[id number]] (Order [id number])    Study Result    Narrative & Impression   US VENOUS DOPPLER LOWER EXTREMITY BILATERAL (DUPLEX)-     REASON FOR EXAM:  Bilateral leg edema; R60.0-Localized edema     Comparison:None     Multiple real-time color Doppler  images were obtained. The deep veins  were well demonstrated sonographically. There is good color doppler  signal seen filling the deep veins. They  are completely compressible by  the ultrasound transducer. There was good spontaneous venous flow and  augmentation. There are no echoes seen along the deep veins to suggest  clot.     IMPRESSION:  No sonographic findings of DVT in the lower extremities     This report was finalized on 6/4/2021 12:54 PM by Dr. Randy Kay II, MD.          Allergies   Allergen Reactions   • Promethazine      hypotension   :      Current Outpatient Medications:   •  anastrozole (ARIMIDEX) 1 MG tablet, Take 1 mg by mouth Daily., Disp: , Rfl:   •  aspirin 81 MG EC tablet, Take 81 mg by mouth daily., Disp: , Rfl:   •  BUDESONIDE ER PO, Take 9 mg by mouth Every Morning., Disp: , Rfl:   •  bumetanide (BUMEX) 1 MG tablet, Take 1 mg by mouth Daily., Disp: , Rfl:   •  busPIRone (BUSPAR) 30 MG tablet, Take 15 mg by mouth 2 (Two) Times a Day As Needed (anxiety)., Disp: , Rfl:   •  diazePAM (VALIUM) 10 MG tablet, Take 10 mg by mouth 2 (Two) Times a Day As Needed for Anxiety., Disp: , Rfl:   •  HYDROcodone-acetaminophen (NORCO) 7.5-325 MG per tablet, Take 1 tablet by mouth Every 6 (Six) Hours As Needed for Moderate Pain ., Disp: , Rfl:   •  levothyroxine (SYNTHROID, LEVOTHROID) 25 MCG tablet, , Disp: , Rfl:   •  metoprolol tartrate (LOPRESSOR) 25 MG tablet, Take 1 tablet by mouth 2 (Two) Times a Day., Disp: 180 tablet, Rfl: 2  •  Multiple Vitamins-Minerals (MULTIVITAL PO), Take  by mouth., Disp: , Rfl:   •  mupirocin (BACTROBAN) 2 % ointment, Apply  topically to the appropriate area as directed 3 (Three) Times a Day., Disp: , Rfl:   •  nitroglycerin (NITROSTAT) 0.4 MG SL tablet, Place 0.4 mg under the tongue Every 5 (Five) Minutes As Needed for Chest Pain. Take no more than 3 doses in 15 minutes., Disp: , Rfl:   •  potassium chloride 10 MEQ CR tablet, Take 1 tablet by mouth Daily., Disp: 30  "tablet, Rfl: 3  •  rosuvastatin (CRESTOR) 40 MG tablet, Take 1 tablet by mouth Every Night., Disp: 90 tablet, Rfl: 2  •  anastrozole (ARIMIDEX) 1 MG tablet, Take 1 tablet by mouth Daily for 30 days., Disp: 30 tablet, Rfl: 5  •  anastrozole (ARIMIDEX) 1 MG tablet, Take 1 tablet by mouth Daily for 30 days., Disp: 30 tablet, Rfl: 11  •  cilostazol (PLETAL) 50 MG tablet, Take 1 tablet by mouth 2 (Two) Times a Day., Disp: 60 tablet, Rfl: 3      The following portions of the patient's history were reviewed and updated as appropriate: allergies, current medications, past family history, past medical history, past social history, past surgical history and problem list.    Social History     Tobacco Use   • Smoking status: Current Every Day Smoker     Packs/day: 1.00     Years: 54.00     Pack years: 54.00     Types: Cigarettes     Start date: 1961   • Smokeless tobacco: Never Used   Vaping Use   • Vaping Use: Never used   Substance Use Topics   • Alcohol use: No   • Drug use: No       Review of Systems   Cardiovascular: Positive for leg swelling. Negative for chest pain, palpitations and syncope.   Respiratory: Negative for shortness of breath.    Neurological: Positive for dizziness.       Objective   Vitals:    08/09/21 0831   BP: 140/54   BP Location: Left arm   Patient Position: Sitting   Cuff Size: Adult   Pulse: 63   Temp: 97.3 °F (36.3 °C)   TempSrc: Infrared   SpO2: 96%   Weight: 61.7 kg (136 lb)   Height: 160 cm (63\")     Body mass index is 24.09 kg/m².    Vitals reviewed.   Constitutional:       Appearance: Well-developed.   Eyes:      Conjunctiva/sclera: Conjunctivae normal.   HENT:      Head: Normocephalic.   Neck:      Thyroid: No thyromegaly.      Vascular: No JVD.      Trachea: No tracheal deviation.   Pulmonary:      Effort: No respiratory distress.      Breath sounds: Normal breath sounds. No wheezing. No rales.   Cardiovascular:      PMI at left midclavicular line. Normal rate. Regular rhythm. Normal S1. " Normal S2.      Murmurs: There is no murmur.      No gallop. No click. No rub.   Pulses:     Intact distal pulses.   Edema:     Pretibial: bilateral 2+ edema of the pretibial area.     Ankle: bilateral 2+ edema of the ankle.     Feet: bilateral 2+ edema of the feet.  Abdominal:      General: Bowel sounds are normal.      Palpations: Abdomen is soft. There is no abdominal mass.      Tenderness: There is no abdominal tenderness.   Musculoskeletal:      Cervical back: Normal range of motion and neck supple. Skin:     General: Skin is warm and dry.   Neurological:      Mental Status: Alert and oriented to person, place, and time.      Cranial Nerves: No cranial nerve deficit.         Lab Results   Component Value Date     05/06/2021    K 4.2 05/06/2021     05/06/2021    CO2 29.9 (H) 05/06/2021    BUN 32 (H) 05/06/2021    CREATININE 1.37 (H) 05/06/2021    GLUCOSE 159 (H) 05/06/2021    CALCIUM 9.3 05/06/2021    AST 28 02/08/2021    ALT 33 (H) 02/08/2021    ALKPHOS 65 02/08/2021    LABIL2 2.0 02/08/2021     Lab Results   Component Value Date    CKTOTAL 60 01/02/2016     Lab Results   Component Value Date    WBC 11.54 (H) 12/16/2020    HGB 13.4 12/16/2020    HCT 43.4 12/16/2020     12/16/2020     Lab Results   Component Value Date    INR 0.99 06/19/2018    INR <0.90 01/02/2016     Lab Results   Component Value Date    MG 2.2 01/02/2016     Lab Results   Component Value Date    TSH 2.355 01/02/2016    CHLPL 146 01/02/2016    TRIG 105 10/13/2016    HDL 52 (L) 10/13/2016    LDL 57 10/13/2016      Lab Results   Component Value Date    .0 (H) 06/19/2018       Assessment/Plan :   Diagnosis Plan   1.  (ASCVD), s/p stenting in 1999, after an MI, repeat stenting in 2005 of the LAD and RCA at [facility] and stenting of the first OM branch of left circumflex on 6/26/2019.Clinically stable.     2. Peripheral arterial disease (CMS/HCC)  Ambulatory Referral to Cardiology   3. SSS (sick sinus syndrome)  (CMS/[facility])     4. Bilateral leg edema          Recommendations:  1. I have reviewed the echo Doppler results with the patient and her daughter.  2. We will go ahead and add Pletal for her peripheral arterial disease and claudication since she does not have any clinical signs of congestive heart failure and has normal LV systolic function.  3. Patient is interested in getting a second opinion regarding her peripheral arterial disease.  I will go ahead and refer her to Dr. Suero (interventional cardiologist).  4. I have reemphasized for her to quit smoking.  She said she has tried nicotine patches which cause rash and Chantix which she could not tolerate.     Return in about 4 months (around 12/9/2021).    As always,   I appreciate very much the opportunity to participate in the cardiovascular care of your patients. Please do not hesitate to call me with any questions with regards to Sirisha Valdez's evaluation and management.       With Best Regards,        Salvador Mccarthy MD, [facility]    Please note that portions of this note were completed with a voice recognition program.

## 2021-10-06 RX ORDER — BUMETANIDE 1 MG/1
TABLET ORAL
Qty: 30 TABLET | Refills: 1 | Status: SHIPPED | OUTPATIENT
Start: 2021-10-06 | End: 2022-08-23

## 2021-10-26 ENCOUNTER — OFFICE VISIT (OUTPATIENT)
Dept: CARDIOLOGY | Facility: CLINIC | Age: 76
End: 2021-10-26

## 2021-10-26 VITALS
HEIGHT: 63 IN | WEIGHT: 141.6 LBS | SYSTOLIC BLOOD PRESSURE: 133 MMHG | OXYGEN SATURATION: 93 % | DIASTOLIC BLOOD PRESSURE: 75 MMHG | HEART RATE: 75 BPM | BODY MASS INDEX: 25.09 KG/M2

## 2021-10-26 DIAGNOSIS — I10 ESSENTIAL HYPERTENSION: ICD-10-CM

## 2021-10-26 DIAGNOSIS — Z72.0 TOBACCO ABUSE: ICD-10-CM

## 2021-10-26 DIAGNOSIS — R60.9 DEPENDENT EDEMA: ICD-10-CM

## 2021-10-26 DIAGNOSIS — R42 DIZZINESS: ICD-10-CM

## 2021-10-26 DIAGNOSIS — I73.9 PERIPHERAL ARTERIAL DISEASE (HCC): Primary | ICD-10-CM

## 2021-10-26 PROCEDURE — 99213 OFFICE O/P EST LOW 20 MIN: CPT | Performed by: INTERNAL MEDICINE

## 2021-11-08 ENCOUNTER — HOSPITAL ENCOUNTER (OUTPATIENT)
Dept: CARDIOLOGY | Facility: HOSPITAL | Age: 76
Discharge: HOME | End: 2021-11-08

## 2021-11-08 DIAGNOSIS — I73.9 PERIPHERAL ARTERIAL DISEASE (HCC): ICD-10-CM

## 2021-11-08 DIAGNOSIS — R42 DIZZINESS: ICD-10-CM

## 2021-11-08 DIAGNOSIS — Z72.0 TOBACCO ABUSE: ICD-10-CM

## 2021-11-08 DIAGNOSIS — R60.9 DEPENDENT EDEMA: ICD-10-CM

## 2021-11-08 DIAGNOSIS — I10 ESSENTIAL HYPERTENSION: ICD-10-CM

## 2021-11-08 PROCEDURE — 93018 CV STRESS TEST I&R ONLY: CPT | Performed by: INTERNAL MEDICINE

## 2021-11-08 PROCEDURE — 93017 CV STRESS TEST TRACING ONLY: CPT

## 2021-11-11 ENCOUNTER — OFFICE VISIT (OUTPATIENT)
Dept: CARDIOLOGY | Facility: CLINIC | Age: 76
End: 2021-11-11

## 2021-11-11 VITALS — TEMPERATURE: 97.1 F | RESPIRATION RATE: 16 BRPM | WEIGHT: 140 LBS | HEIGHT: 63 IN | BODY MASS INDEX: 24.8 KG/M2

## 2021-11-11 DIAGNOSIS — I73.9 PERIPHERAL ARTERIAL DISEASE (HCC): ICD-10-CM

## 2021-11-11 DIAGNOSIS — I25.10 ARTERIOSCLEROTIC CARDIOVASCULAR DISEASE (ASCVD): Primary | ICD-10-CM

## 2021-11-11 DIAGNOSIS — Z72.0 TOBACCO ABUSE: ICD-10-CM

## 2021-11-11 DIAGNOSIS — I49.5 SSS (SICK SINUS SYNDROME) (HCC): ICD-10-CM

## 2021-11-11 PROCEDURE — 99213 OFFICE O/P EST LOW 20 MIN: CPT | Performed by: INTERNAL MEDICINE

## 2021-11-11 RX ORDER — CILOSTAZOL 50 MG/1
50 TABLET ORAL 2 TIMES DAILY
Qty: 60 TABLET | Refills: 5 | Status: SHIPPED | OUTPATIENT
Start: 2021-11-11 | End: 2022-02-23 | Stop reason: SDUPTHER

## 2021-11-11 NOTE — PROGRESS NOTES
Yasmine Reynolds MD  Sirisha Valdez  1945  11/11/2021    Patient Active Problem List   Diagnosis   •  (ASCVD), s/p stenting in 1999, after an MI, repeat stenting in 2005 of the LAD and RCA at [facility] and stenting of the first OM branch of left circumflex on 6/26/2019.Clinically stable.   • SSS (sick sinus syndrome), s/p permanent pacemaker implantion,    • Palpitations   • Dizziness   • Dyslipidemia, currently on rosuvastatin 40 mg daily   • Essential hypertension, blood pressure is running low associated with fatigue.   • PVD (peripheral vascular disease) (HCC)   • Swelling of both lower extremities   • Chronic left shoulder pain   • S/P arthroscopy of shoulder   • Coagulopathy (HCC)   • Bilateral malignant neoplasm of breast in female, estrogen receptor positive (HCC)   • Tobacco abuse   • Aromatase inhibitor use   • Osteopenia   • Abnormal EKG   • Abnormal nuclear stress test   • Collagenous colitis   • Pacemaker battery depletion   • Stage 3b chronic kidney disease ([facility])       Dear Yasmine Reynolds MD:    Subjective     Sirisha Valdez is a 76 y.o. female with the problems as listed above, presents    Chief complaint: Follow-up of coronary artery disease and sick sinus syndrome, status post permanent pacemaker implantation.    History of Present Illness: Ms. Valdez is a pleasant 76-year-old  female with history of known coronary heart disease, s/p stenting of the LAD and RCA in 2005 and OM branch of circumflex in 2019, history of sick sinus syndrome, status post permanent pacemaker implantation in 2011 with recent battery replacement with a [employer] medical device and peripheral arterial disease, being followed by Dr. Suero, is here for regular cardiology follow-up.  On today's visit she denies any complaints of chest pains or significant shortness of breath.  She denies any significant leg claudication either.  She continues smoke about a pack a day.  She says she has  tried nicotine patches and Chantix which apparently did not work.  States the leg swelling has been better.      Allergies   Allergen Reactions   • Promethazine      hypotension   :      Current Outpatient Medications:   •  anastrozole (ARIMIDEX) 1 MG tablet, Take 1 mg by mouth Daily., Disp: , Rfl:   •  BUDESONIDE ER PO, Take 9 mg by mouth Every Morning., Disp: , Rfl:   •  bumetanide (BUMEX) 1 MG tablet, TAKE 1 TABLET DAILY FOR 3 DAYS, MAY TAKE 1 TAB DAILY FOR 2 OR 3 DAYS AT A TIME IF THERE IS A WEIGHT GAIN OF MORE THAN 3, Disp: 30 tablet, Rfl: 1  •  busPIRone (BUSPAR) 30 MG tablet, Take 15 mg by mouth 2 (Two) Times a Day As Needed (anxiety)., Disp: , Rfl:   •  cilostazol (PLETAL) 50 MG tablet, Take 1 tablet by mouth 2 (Two) Times a Day., Disp: 60 tablet, Rfl: 3  •  diazePAM (VALIUM) 10 MG tablet, Take 10 mg by mouth 2 (Two) Times a Day As Needed for Anxiety., Disp: , Rfl:   •  levothyroxine (SYNTHROID, LEVOTHROID) 25 MCG tablet, , Disp: , Rfl:   •  metoprolol tartrate (LOPRESSOR) 25 MG tablet, Take 1 tablet by mouth 2 (Two) Times a Day., Disp: 180 tablet, Rfl: 2  •  mupirocin (BACTROBAN) 2 % ointment, Apply  topically to the appropriate area as directed 3 (Three) Times a Day., Disp: , Rfl:   •  potassium chloride 10 MEQ CR tablet, Take 1 tablet by mouth Daily., Disp: 30 tablet, Rfl: 3  •  rosuvastatin (CRESTOR) 40 MG tablet, Take 1 tablet by mouth Every Night., Disp: 90 tablet, Rfl: 2  •  anastrozole (ARIMIDEX) 1 MG tablet, Take 1 tablet by mouth Daily for 30 days., Disp: 30 tablet, Rfl: 5  •  anastrozole (ARIMIDEX) 1 MG tablet, Take 1 tablet by mouth Daily for 30 days., Disp: 30 tablet, Rfl: 11  •  aspirin 81 MG EC tablet, Take 81 mg by mouth daily., Disp: , Rfl:   •  HYDROcodone-acetaminophen (NORCO) 7.5-325 MG per tablet, Take 1 tablet by mouth Every 6 (Six) Hours As Needed for Moderate Pain ., Disp: , Rfl:   •  Multiple Vitamins-Minerals (MULTIVITAL PO), Take  by mouth., Disp: , Rfl:   •  nitroglycerin  "(NITROSTAT) 0.4 MG SL tablet, Place 0.4 mg under the tongue Every 5 (Five) Minutes As Needed for Chest Pain. Take no more than 3 doses in 15 minutes., Disp: , Rfl:       The following portions of the patient's history were reviewed and updated as appropriate: allergies, current medications, past family history, past medical history, past social history, past surgical history and problem list.    Social History     Tobacco Use   • Smoking status: Current Every Day Smoker     Packs/day: 1.00     Years: 54.00     Pack years: 54.00     Types: Cigarettes     Start date: 1961   • Smokeless tobacco: Never Used   Vaping Use   • Vaping Use: Never used   Substance Use Topics   • Alcohol use: No   • Drug use: No       Review of Systems   Cardiovascular: Positive for dyspnea on exertion and leg swelling. Negative for chest pain and palpitations.   Respiratory: Negative for shortness of breath.        Objective   Vitals:    11/11/21 1226   Resp: 16   Temp: 97.1 °F (36.2 °C)   Weight: 63.5 kg (140 lb)   Height: 160 cm (63\")     Body mass index is 24.8 kg/m².    Constitutional:       Appearance: Well-developed.   Eyes:      Conjunctiva/sclera: Conjunctivae normal.   HENT:      Head: Normocephalic.   Neck:      Thyroid: No thyromegaly.      Vascular: No JVD.      Trachea: No tracheal deviation.   Pulmonary:      Effort: No respiratory distress.      Breath sounds: Normal breath sounds. No wheezing. No rales.   Cardiovascular:      PMI at left midclavicular line. Normal rate. Regular rhythm. Normal S1. Normal S2.      Murmurs: There is no murmur.      No gallop. No click. No rub.   Pulses:     Intact distal pulses.   Edema:     Pretibial: bilateral trace edema of the pretibial area.     Ankle: bilateral trace edema of the ankle.     Feet: bilateral trace edema of the feet.  Abdominal:      General: Bowel sounds are normal.      Palpations: Abdomen is soft. There is no abdominal mass.      Tenderness: There is no abdominal " tenderness.   Musculoskeletal:      Cervical back: Normal range of motion and neck supple. Skin:     General: Skin is warm and dry.   Neurological:      Mental Status: Alert and oriented to person, place, and time.      Cranial Nerves: No cranial nerve deficit.         Lab Results   Component Value Date     05/06/2021    K 4.2 05/06/2021     05/06/2021    CO2 29.9 (H) 05/06/2021    BUN 32 (H) 05/06/2021    CREATININE 1.37 (H) 05/06/2021    GLUCOSE 159 (H) 05/06/2021    CALCIUM 9.3 05/06/2021    AST 28 02/08/2021    ALT 33 (H) 02/08/2021    ALKPHOS 65 02/08/2021    LABIL2 2.0 02/08/2021     Lab Results   Component Value Date    CKTOTAL 60 01/02/2016     Lab Results   Component Value Date    WBC 11.54 (H) 12/16/2020    HGB 13.4 12/16/2020    HCT 43.4 12/16/2020     12/16/2020     Lab Results   Component Value Date    INR 0.99 06/19/2018    INR <0.90 01/02/2016     Lab Results   Component Value Date    MG 2.2 01/02/2016     Lab Results   Component Value Date    TSH 2.355 01/02/2016    CHLPL 146 01/02/2016    TRIG 105 10/13/2016    HDL 52 (L) 10/13/2016    LDL 57 10/13/2016      Lab Results   Component Value Date    .0 (H) 06/19/2018     Assessment/Plan :   Diagnosis Plan   1.  (ASCVD), s/p stenting in 1999, after an MI, repeat stenting in 2005 of the LAD and RCA at [facility] and stenting of the first OM branch of left circumflex on 6/26/2019.Clinically stable.     2. SSS (sick sinus syndrome), status post permanent dual-chamber pacemaker implantation in 2011 with recent battery replacement on 12/16/2020 with [employer] medical device. Functioning adequately.    3. Peripheral arterial disease clinically asymptomatic and stable.     4. Tobacco abuse          Recommendations:  1. Continue aspirin, rosuvastatin and metroprolol at current doses..  2. I have strongly reemphasized for her to quit smoking.    Return in about 6 months (around 5/11/2022).    As always, Yasmine Reynolds MD   I appreciate very much the opportunity to participate in the cardiovascular care of your patients. Please do not hesitate to call me with any questions with regards to Sirisha Valdez's evaluation and management.       With Best Regards,        Salvador Mccarthy MD, FACC    Please note that portions of this note were completed with a voice recognition program.

## 2021-11-12 LAB
BH CV STRESS BP STAGE 1: NORMAL
BH CV STRESS DURATION MIN STAGE 1: 4
BH CV STRESS DURATION SEC STAGE 1: 30
BH CV STRESS GRADE STAGE 1: 0
BH CV STRESS HR STAGE 1: 95
BH CV STRESS METS STAGE 1: 2.3
BH CV STRESS PROTOCOL 1: NORMAL
BH CV STRESS RECOVERY BP: NORMAL MMHG
BH CV STRESS RECOVERY HR: 77 BPM
BH CV STRESS SPEED STAGE 1: 1.7
BH CV STRESS STAGE 1: 1
MAXIMAL PREDICTED HEART RATE: 146 BPM
PERCENT MAX PREDICTED HR: 65.07 %
STRESS BASELINE BP: NORMAL MMHG
STRESS BASELINE HR: 85 BPM
STRESS PERCENT HR: 77 %
STRESS POST ESTIMATED WORKLOAD: 1.6 METS
STRESS POST EXERCISE DUR MIN: 4 MIN
STRESS POST EXERCISE DUR SEC: 30 SEC
STRESS POST PEAK BP: NORMAL MMHG
STRESS POST PEAK HR: 95 BPM
STRESS TARGET HR: 124 BPM

## 2021-11-15 ENCOUNTER — TELEPHONE (OUTPATIENT)
Dept: CARDIOLOGY | Facility: CLINIC | Age: 76
End: 2021-11-15

## 2021-11-15 NOTE — TELEPHONE ENCOUNTER
Called pt to advise her that if her stress test is abnormal that we will call her and let her know. No answer LM.

## 2021-11-29 ENCOUNTER — TELEPHONE (OUTPATIENT)
Dept: CARDIOLOGY | Facility: CLINIC | Age: 76
End: 2021-11-29

## 2021-12-06 ENCOUNTER — OFFICE VISIT (OUTPATIENT)
Dept: CARDIOLOGY | Facility: CLINIC | Age: 76
End: 2021-12-06

## 2021-12-06 VITALS
OXYGEN SATURATION: 95 % | HEART RATE: 92 BPM | WEIGHT: 143.4 LBS | DIASTOLIC BLOOD PRESSURE: 78 MMHG | HEIGHT: 67 IN | BODY MASS INDEX: 22.51 KG/M2 | SYSTOLIC BLOOD PRESSURE: 156 MMHG

## 2021-12-06 DIAGNOSIS — I73.9 PVD (PERIPHERAL VASCULAR DISEASE) (HCC): Primary | ICD-10-CM

## 2021-12-06 PROCEDURE — 99213 OFFICE O/P EST LOW 20 MIN: CPT | Performed by: NURSE PRACTITIONER

## 2021-12-06 NOTE — PROGRESS NOTES
"Chief Complaint  Follow-up    Subjective          Sirisha Valdez presents to [facility] for follow up.    History of Present Illness    Patient was last seen in office on 10/26/2021.  She was referred for peripheral arterial disease.    At today's visit Ms. Valdez does report lower extremity swelling and bilateral lower extremity pain, right worse than left.  She reports dizziness when she walks short distances.  A treadmill stress was ordered after her last visit and was reported as normal.    Ms. Valdez sees Dr. Mccarthy for cardiac care.    Objective     Vital Signs:   /78 (BP Location: Left arm, Patient Position: Sitting, Cuff Size: Adult)   Pulse 92   Ht 170.2 cm (67\")   Wt 65 kg (143 lb 6.4 oz)   SpO2 95%   BMI 22.46 kg/m²       Physical Exam  Vitals reviewed.   Constitutional:       Appearance: Normal appearance. She is well-developed.   Cardiovascular:      Rate and Rhythm: Normal rate and regular rhythm.      Pulses:           Dorsalis pedis pulses are 0 on the right side and 1+ on the left side.        Posterior tibial pulses are 0 on the right side and 1+ on the left side.      Heart sounds: No murmur heard.  No friction rub. No gallop.    Pulmonary:      Effort: Pulmonary effort is normal. No respiratory distress.      Breath sounds: Normal breath sounds. No wheezing or rales.   Skin:     General: Skin is warm and dry.   Neurological:      Mental Status: She is alert and oriented to person, place, and time.   Psychiatric:         Mood and Affect: Mood normal.         Behavior: Behavior normal.          Result Review :                Current Outpatient Medications   Medication Sig Dispense Refill   • anastrozole (ARIMIDEX) 1 MG tablet Take 1 mg by mouth Daily.     • aspirin 81 MG EC tablet Take 81 mg by mouth daily.     • BUDESONIDE ER PO Take 9 mg by mouth Every Morning.     • bumetanide (BUMEX) 1 MG tablet TAKE 1 TABLET DAILY FOR 3 DAYS, MAY TAKE 1 TAB DAILY FOR 2 OR " 3 DAYS AT A TIME IF THERE IS A WEIGHT GAIN OF MORE THAN 3 30 tablet 1   • busPIRone (BUSPAR) 30 MG tablet Take 15 mg by mouth 2 (Two) Times a Day As Needed (anxiety).     • cilostazol (PLETAL) 50 MG tablet Take 1 tablet by mouth 2 (Two) Times a Day. 60 tablet 5   • diazePAM (VALIUM) 10 MG tablet Take 10 mg by mouth 2 (Two) Times a Day As Needed for Anxiety.     • HYDROcodone-acetaminophen (NORCO) 7.5-325 MG per tablet Take 1 tablet by mouth Every 6 (Six) Hours As Needed for Moderate Pain .     • levothyroxine (SYNTHROID, LEVOTHROID) 25 MCG tablet      • metoprolol tartrate (LOPRESSOR) 25 MG tablet Take 1 tablet by mouth 2 (Two) Times a Day. 180 tablet 2   • Multiple Vitamins-Minerals (MULTIVITAL PO) Take  by mouth.     • nitroglycerin (NITROSTAT) 0.4 MG SL tablet Place 0.4 mg under the tongue Every 5 (Five) Minutes As Needed for Chest Pain. Take no more than 3 doses in 15 minutes.     • potassium chloride 10 MEQ CR tablet Take 1 tablet by mouth Daily. 30 tablet 3   • rosuvastatin (CRESTOR) 40 MG tablet Take 1 tablet by mouth Every Night. 90 tablet 2   • anastrozole (ARIMIDEX) 1 MG tablet Take 1 tablet by mouth Daily for 30 days. 30 tablet 5   • anastrozole (ARIMIDEX) 1 MG tablet Take 1 tablet by mouth Daily for 30 days. 30 tablet 11   • mupirocin (BACTROBAN) 2 % ointment Apply  topically to the appropriate area as directed 3 (Three) Times a Day.       No current facility-administered medications for this visit.            Assessment and Plan    Problem List Items Addressed This Visit        Cardiac and Vasculature    PVD (peripheral vascular disease) ([facility]) - Primary    Relevant Orders    CT angio abdominal aorta bilat iliofem runoff w wo contrast              Follow Up     Medications were reviewed with the patient.    CT angiogram with runoff has been ordered to evaluate arterial flow to the lower extremities.    Patient is to continue rosuvastatin, Pletal and Bumex.  Continue aspirin.    Return in about 3 months  (around 3/6/2022).    Patient was given instructions and counseling regarding her condition or for health maintenance advice. Please see specific information pulled into the AVS if appropriate.

## 2021-12-21 DIAGNOSIS — Z17.0 MALIGNANT NEOPLASM OF CENTRAL PORTION OF BOTH BREASTS IN FEMALE, ESTROGEN RECEPTOR POSITIVE (HCC): Primary | ICD-10-CM

## 2021-12-21 DIAGNOSIS — C50.112 MALIGNANT NEOPLASM OF CENTRAL PORTION OF BOTH BREASTS IN FEMALE, ESTROGEN RECEPTOR POSITIVE (HCC): Primary | ICD-10-CM

## 2021-12-21 DIAGNOSIS — C50.111 MALIGNANT NEOPLASM OF CENTRAL PORTION OF BOTH BREASTS IN FEMALE, ESTROGEN RECEPTOR POSITIVE (HCC): Primary | ICD-10-CM

## 2022-01-06 ENCOUNTER — APPOINTMENT (OUTPATIENT)
Dept: CT IMAGING | Facility: HOSPITAL | Age: 77
End: 2022-01-06

## 2022-01-07 ENCOUNTER — APPOINTMENT (OUTPATIENT)
Dept: MAMMOGRAPHY | Facility: HOSPITAL | Age: 77
End: 2022-01-07

## 2022-01-18 ENCOUNTER — TELEPHONE (OUTPATIENT)
Dept: SURGERY | Facility: CLINIC | Age: 77
End: 2022-01-18

## 2022-01-21 ENCOUNTER — TELEPHONE (OUTPATIENT)
Dept: CARDIOLOGY | Facility: CLINIC | Age: 77
End: 2022-01-21

## 2022-01-21 ENCOUNTER — HOSPITAL ENCOUNTER (OUTPATIENT)
Dept: MAMMOGRAPHY | Facility: HOSPITAL | Age: 77
Discharge: HOME | End: 2022-01-21
Admitting: FAMILY MEDICINE

## 2022-01-21 DIAGNOSIS — Z12.31 VISIT FOR SCREENING MAMMOGRAM: ICD-10-CM

## 2022-01-21 PROCEDURE — 77063 BREAST TOMOSYNTHESIS BI: CPT

## 2022-01-21 PROCEDURE — 77063 BREAST TOMOSYNTHESIS BI: CPT | Performed by: RADIOLOGY

## 2022-01-21 PROCEDURE — 77067 SCR MAMMO BI INCL CAD: CPT

## 2022-01-21 PROCEDURE — 77067 SCR MAMMO BI INCL CAD: CPT | Performed by: RADIOLOGY

## 2022-01-26 ENCOUNTER — APPOINTMENT (OUTPATIENT)
Dept: CT IMAGING | Facility: HOSPITAL | Age: 77
End: 2022-01-26

## 2022-02-18 ENCOUNTER — APPOINTMENT (OUTPATIENT)
Dept: MAMMOGRAPHY | Facility: HOSPITAL | Age: 77
End: 2022-02-18

## 2022-02-21 ENCOUNTER — OFFICE VISIT (OUTPATIENT)
Dept: SURGERY | Facility: CLINIC | Age: 77
End: 2022-02-21

## 2022-02-21 ENCOUNTER — TELEPHONE (OUTPATIENT)
Dept: SURGERY | Facility: CLINIC | Age: 77
End: 2022-02-21

## 2022-02-21 VITALS
WEIGHT: 149.2 LBS | HEIGHT: 67 IN | DIASTOLIC BLOOD PRESSURE: 59 MMHG | BODY MASS INDEX: 23.42 KG/M2 | HEART RATE: 87 BPM | SYSTOLIC BLOOD PRESSURE: 154 MMHG

## 2022-02-21 DIAGNOSIS — Z78.0 POST-MENOPAUSE: Primary | ICD-10-CM

## 2022-02-21 DIAGNOSIS — Z17.0 MALIGNANT NEOPLASM OF CENTRAL PORTION OF BOTH BREASTS IN FEMALE, ESTROGEN RECEPTOR POSITIVE: ICD-10-CM

## 2022-02-21 DIAGNOSIS — C50.111 MALIGNANT NEOPLASM OF CENTRAL PORTION OF BOTH BREASTS IN FEMALE, ESTROGEN RECEPTOR POSITIVE: ICD-10-CM

## 2022-02-21 DIAGNOSIS — M85.88 OSTEOPENIA OF OTHER SITE: Primary | ICD-10-CM

## 2022-02-21 DIAGNOSIS — C50.112 MALIGNANT NEOPLASM OF CENTRAL PORTION OF BOTH BREASTS IN FEMALE, ESTROGEN RECEPTOR POSITIVE: ICD-10-CM

## 2022-02-21 PROCEDURE — 99213 OFFICE O/P EST LOW 20 MIN: CPT | Performed by: SURGERY

## 2022-02-21 PROCEDURE — 93702 BIS XTRACELL FLUID ANALYSIS: CPT | Performed by: SURGERY

## 2022-02-21 RX ORDER — ANASTROZOLE 1 MG/1
1 TABLET ORAL DAILY
Qty: 90 TABLET | Refills: 3 | Status: SHIPPED | OUTPATIENT
Start: 2022-02-21 | End: 2022-08-23

## 2022-02-23 ENCOUNTER — TELEPHONE (OUTPATIENT)
Dept: CARDIOLOGY | Facility: CLINIC | Age: 77
End: 2022-02-23

## 2022-02-23 RX ORDER — CILOSTAZOL 50 MG/1
50 TABLET ORAL 2 TIMES DAILY
Qty: 60 TABLET | Refills: 5 | Status: SHIPPED | OUTPATIENT
Start: 2022-02-23 | End: 2022-05-12 | Stop reason: SDUPTHER

## 2022-02-23 NOTE — TELEPHONE ENCOUNTER
Pt called in stating her Pharmacy has changed from [facility] in [place] to [facility] in [place], [place] & is needing a refill on Cilostazol 50MG [phone]

## 2022-03-02 RX ORDER — BUDESONIDE 3 MG/1
6 CAPSULE, COATED PELLETS ORAL 2 TIMES DAILY
Qty: 60 CAPSULE | Refills: 2 | Status: SHIPPED | OUTPATIENT
Start: 2022-03-02 | End: 2022-08-23

## 2022-04-07 ENCOUNTER — TELEPHONE (OUTPATIENT)
Dept: CARDIOLOGY | Facility: CLINIC | Age: 77
End: 2022-04-07

## 2022-04-07 NOTE — TELEPHONE ENCOUNTER
Last transmission 05/26/2021. Called patient. She reports that she took her monitor back to office months ago because she decided she did not want to participate in remote monitoring. I have released patient from remote monitoring.

## 2022-04-12 ENCOUNTER — TELEPHONE (OUTPATIENT)
Dept: CARDIOLOGY | Facility: CLINIC | Age: 77
End: 2022-04-12

## 2022-05-11 ENCOUNTER — LAB (OUTPATIENT)
Dept: LAB | Facility: HOSPITAL | Age: 77
End: 2022-05-11

## 2022-05-11 ENCOUNTER — TRANSCRIBE ORDERS (OUTPATIENT)
Dept: ADMINISTRATIVE | Facility: HOSPITAL | Age: 77
End: 2022-05-11

## 2022-05-11 DIAGNOSIS — E78.5 DYSLIPIDEMIA: ICD-10-CM

## 2022-05-11 DIAGNOSIS — E03.9 HYPOTHYROIDISM, UNSPECIFIED TYPE: ICD-10-CM

## 2022-05-11 DIAGNOSIS — N17.9 ACUTE RENAL FAILURE, UNSPECIFIED ACUTE RENAL FAILURE TYPE: Primary | ICD-10-CM

## 2022-05-11 DIAGNOSIS — E87.6 HYPOKALEMIA: ICD-10-CM

## 2022-05-11 DIAGNOSIS — N17.9 ACUTE RENAL FAILURE, UNSPECIFIED ACUTE RENAL FAILURE TYPE: ICD-10-CM

## 2022-05-11 PROCEDURE — 80061 LIPID PANEL: CPT

## 2022-05-11 PROCEDURE — 83735 ASSAY OF MAGNESIUM: CPT

## 2022-05-11 PROCEDURE — 81003 URINALYSIS AUTO W/O SCOPE: CPT

## 2022-05-11 PROCEDURE — 82306 VITAMIN D 25 HYDROXY: CPT

## 2022-05-11 PROCEDURE — 80053 COMPREHEN METABOLIC PANEL: CPT

## 2022-05-11 PROCEDURE — 82570 ASSAY OF URINE CREATININE: CPT

## 2022-05-11 PROCEDURE — 36415 COLL VENOUS BLD VENIPUNCTURE: CPT

## 2022-05-11 PROCEDURE — 84443 ASSAY THYROID STIM HORMONE: CPT

## 2022-05-11 PROCEDURE — 84156 ASSAY OF PROTEIN URINE: CPT

## 2022-05-12 ENCOUNTER — CLINICAL SUPPORT (OUTPATIENT)
Dept: CARDIOLOGY | Facility: CLINIC | Age: 77
End: 2022-05-12

## 2022-05-12 ENCOUNTER — OFFICE VISIT (OUTPATIENT)
Dept: CARDIOLOGY | Facility: CLINIC | Age: 77
End: 2022-05-12

## 2022-05-12 VITALS
HEART RATE: 85 BPM | HEIGHT: 67 IN | DIASTOLIC BLOOD PRESSURE: 71 MMHG | BODY MASS INDEX: 22.54 KG/M2 | WEIGHT: 143.6 LBS | SYSTOLIC BLOOD PRESSURE: 134 MMHG | OXYGEN SATURATION: 95 % | TEMPERATURE: 97.3 F

## 2022-05-12 DIAGNOSIS — I49.5 SSS (SICK SINUS SYNDROME): Primary | ICD-10-CM

## 2022-05-12 DIAGNOSIS — I25.10 ARTERIOSCLEROTIC CARDIOVASCULAR DISEASE (ASCVD): Primary | ICD-10-CM

## 2022-05-12 DIAGNOSIS — E78.5 DYSLIPIDEMIA: ICD-10-CM

## 2022-05-12 DIAGNOSIS — I49.5 SSS (SICK SINUS SYNDROME): ICD-10-CM

## 2022-05-12 DIAGNOSIS — I73.9 PERIPHERAL ARTERIAL DISEASE: ICD-10-CM

## 2022-05-12 DIAGNOSIS — Z72.0 TOBACCO ABUSE: ICD-10-CM

## 2022-05-12 LAB
25(OH)D3 SERPL-MCNC: 55.7 NG/ML (ref 30–100)
ALBUMIN SERPL-MCNC: 3.6 G/DL (ref 3.5–5.2)
ALBUMIN/GLOB SERPL: 1.4 G/DL
ALP SERPL-CCNC: 68 U/L (ref 39–117)
ALT SERPL W P-5'-P-CCNC: 14 U/L (ref 1–33)
ANION GAP SERPL CALCULATED.3IONS-SCNC: 13 MMOL/L (ref 5–15)
AST SERPL-CCNC: 20 U/L (ref 1–32)
BILIRUB SERPL-MCNC: 0.3 MG/DL (ref 0–1.2)
BILIRUB UR QL STRIP: NEGATIVE
BUN SERPL-MCNC: 11 MG/DL (ref 8–23)
BUN/CREAT SERPL: 10.3 (ref 7–25)
CALCIUM SPEC-SCNC: 9.4 MG/DL (ref 8.6–10.5)
CHLORIDE SERPL-SCNC: 106 MMOL/L (ref 98–107)
CHOLEST SERPL-MCNC: 133 MG/DL (ref 0–200)
CLARITY UR: CLEAR
CO2 SERPL-SCNC: 22 MMOL/L (ref 22–29)
COLOR UR: YELLOW
CREAT SERPL-MCNC: 1.07 MG/DL (ref 0.57–1)
CREAT UR-MCNC: 85.8 MG/DL
EGFRCR SERPLBLD CKD-EPI 2021: 54.6 ML/MIN/1.73
GLOBULIN UR ELPH-MCNC: 2.6 GM/DL
GLUCOSE SERPL-MCNC: 84 MG/DL (ref 65–99)
GLUCOSE UR STRIP-MCNC: NEGATIVE MG/DL
HDLC SERPL-MCNC: 51 MG/DL (ref 40–60)
HGB UR QL STRIP.AUTO: NEGATIVE
KETONES UR QL STRIP: NEGATIVE
LDLC SERPL CALC-MCNC: 59 MG/DL (ref 0–100)
LDLC/HDLC SERPL: 1.09 {RATIO}
LEUKOCYTE ESTERASE UR QL STRIP.AUTO: ABNORMAL
MAGNESIUM SERPL-MCNC: 1.7 MG/DL (ref 1.6–2.4)
NITRITE UR QL STRIP: NEGATIVE
PH UR STRIP.AUTO: 5.5 [PH] (ref 5–8)
POTASSIUM SERPL-SCNC: 3.9 MMOL/L (ref 3.5–5.2)
PROT ?TM UR-MCNC: 8.2 MG/DL
PROT SERPL-MCNC: 6.2 G/DL (ref 6–8.5)
PROT UR QL STRIP: NEGATIVE
PROT/CREAT UR: 95.6 MG/G CREA (ref 0–200)
SODIUM SERPL-SCNC: 141 MMOL/L (ref 136–145)
SP GR UR STRIP: 1.01 (ref 1–1.03)
TRIGL SERPL-MCNC: 131 MG/DL (ref 0–150)
TSH SERPL DL<=0.05 MIU/L-ACNC: 2.28 UIU/ML (ref 0.27–4.2)
UROBILINOGEN UR QL STRIP: ABNORMAL
VLDLC SERPL-MCNC: 23 MG/DL (ref 5–40)

## 2022-05-12 PROCEDURE — 93000 ELECTROCARDIOGRAM COMPLETE: CPT | Performed by: INTERNAL MEDICINE

## 2022-05-12 PROCEDURE — 99214 OFFICE O/P EST MOD 30 MIN: CPT | Performed by: INTERNAL MEDICINE

## 2022-05-12 RX ORDER — PANTOPRAZOLE SODIUM 20 MG/1
20 TABLET, DELAYED RELEASE ORAL DAILY
COMMUNITY

## 2022-05-12 RX ORDER — CILOSTAZOL 50 MG/1
50 TABLET ORAL 2 TIMES DAILY
Qty: 60 TABLET | Refills: 5 | Status: SHIPPED | OUTPATIENT
Start: 2022-05-12 | End: 2022-07-29 | Stop reason: SDUPTHER

## 2022-05-12 NOTE — PROGRESS NOTES
Yasmine Reynolds MD  Sirisha Valdez  1945  05/12/2022    Patient Active Problem List   Diagnosis   •  (ASCVD), s/p stenting in 1999, after an MI, repeat stenting in 2005 of the LAD and RCA at [facility] and stenting of the first OM branch of left circumflex on 6/26/2019.Clinically stable.   • SSS (sick sinus syndrome), s/p permanent pacemaker implantion,    • Palpitations   • Dizziness   • Dyslipidemia, currently on rosuvastatin 40 mg daily   • Essential hypertension, blood pressure is running low associated with fatigue.   • PVD (peripheral vascular disease) (HCC)   • Swelling of both lower extremities   • Chronic left shoulder pain   • S/P arthroscopy of shoulder   • Coagulopathy (HCC)   • Bilateral malignant neoplasm of breast in female, estrogen receptor positive (HCC)   • Tobacco abuse   • Aromatase inhibitor use   • Osteopenia   • Abnormal EKG   • Abnormal nuclear stress test   • Collagenous colitis   • Pacemaker battery depletion   • Stage 3b chronic kidney disease ([facility])       Dear Yasmine Reynolds MD:    Subjective     Sirisha Valdez is a 76 y.o. female with the problems as listed above, presents    Chief complaint: Follow up CAD and SSS    History of Present Illness: Ms. Valdez is a pleasant 76-year-old  female with history of known ASCVD, status post stenting in 1999 and in 2005 involving LAD and RCA and stenting of the left circumflex OM branch in June 2019 and history of sick sinus syndrome, status post permanent pacemaker implantation with a [employer] medical device, presents for regular cardiology follow-up.  On today's visit she denies any complaints of chest pains, shortness of breath, palpitations, dizziness or syncope.  Overall she feels pretty good.  She admits that she still smokes about a pack a day.      Allergies   Allergen Reactions   • Promethazine      hypotension       Current Outpatient Medications:   •  albuterol (PROAIR RESPICLICK) 108 (90 Base)  MCG/ACT inhaler, Inhale Every 4 (Four) Hours As Needed for Wheezing., Disp: , Rfl:   •  aspirin 81 MG EC tablet, Take 81 mg by mouth daily., Disp: , Rfl:   •  Budesonide (Entocort EC) 3 MG 24 hr capsule, Take 2 capsules by mouth 2 (Two) Times a Day., Disp: 60 capsule, Rfl: 2  •  BUDESONIDE ER PO, Take 9 mg by mouth Every Morning., Disp: , Rfl:   •  busPIRone (BUSPAR) 30 MG tablet, Take 15 mg by mouth 2 (Two) Times a Day As Needed (anxiety)., Disp: , Rfl:   •  cilostazol (PLETAL) 50 MG tablet, Take 1 tablet by mouth 2 (Two) Times a Day., Disp: 60 tablet, Rfl: 5  •  diazePAM (VALIUM) 10 MG tablet, Take 10 mg by mouth 2 (Two) Times a Day As Needed for Anxiety., Disp: , Rfl:   •  HYDROcodone-acetaminophen (NORCO) 7.5-325 MG per tablet, Take 1 tablet by mouth Every 6 (Six) Hours As Needed for Moderate Pain ., Disp: , Rfl:   •  levothyroxine (SYNTHROID, LEVOTHROID) 25 MCG tablet, , Disp: , Rfl:   •  metoprolol tartrate (LOPRESSOR) 25 MG tablet, TAKE 1 TABLET TWICE DAILY, Disp: 180 tablet, Rfl: 2  •  Multiple Vitamins-Minerals (MULTIVITAL PO), Take  by mouth., Disp: , Rfl:   •  mupirocin (BACTROBAN) 2 % ointment, Apply  topically to the appropriate area as directed 3 (Three) Times a Day., Disp: , Rfl:   •  nitroglycerin (NITROSTAT) 0.4 MG SL tablet, Place 0.4 mg under the tongue Every 5 (Five) Minutes As Needed for Chest Pain. Take no more than 3 doses in 15 minutes., Disp: , Rfl:   •  pantoprazole (PROTONIX) 20 MG EC tablet, Take 20 mg by mouth Daily., Disp: , Rfl:   •  potassium chloride 10 MEQ CR tablet, Take 1 tablet by mouth Daily., Disp: 30 tablet, Rfl: 3  •  rosuvastatin (CRESTOR) 40 MG tablet, Take 1 tablet by mouth Every Night., Disp: 90 tablet, Rfl: 2  •  anastrozole (ARIMIDEX) 1 MG tablet, Take 1 tablet by mouth Daily for 30 days., Disp: 30 tablet, Rfl: 5  •  anastrozole (ARIMIDEX) 1 MG tablet, Take 1 tablet by mouth Daily for 30 days., Disp: 30 tablet, Rfl: 11  •  anastrozole (ARIMIDEX) 1 MG tablet, Take 1 mg  "by mouth Daily., Disp: , Rfl:   •  anastrozole (ARIMIDEX) 1 MG tablet, Take 1 tablet by mouth Daily for 90 days., Disp: 90 tablet, Rfl: 3  •  bumetanide (BUMEX) 1 MG tablet, TAKE 1 TABLET DAILY FOR 3 DAYS, MAY TAKE 1 TAB DAILY FOR 2 OR 3 DAYS AT A TIME IF THERE IS A WEIGHT GAIN OF MORE THAN 3, Disp: 30 tablet, Rfl: 1      The following portions of the patient's history were reviewed and updated as appropriate: allergies, current medications, past family history, past medical history, past social history, past surgical history and problem list.    Social History     Tobacco Use   • Smoking status: Current Every Day Smoker     Packs/day: 1.00     Years: 54.00     Pack years: 54.00     Types: Cigarettes     Start date: 1961   • Smokeless tobacco: Never Used   Vaping Use   • Vaping Use: Never used   Substance Use Topics   • Alcohol use: No   • Drug use: No     Review of Systems   Constitutional: Negative for chills and fever.   HENT: Negative for nosebleeds and sore throat.    Respiratory: Negative for cough, hemoptysis and wheezing.    Gastrointestinal: Negative for abdominal pain, hematemesis, hematochezia, melena, nausea and vomiting.   Genitourinary: Negative for dysuria and hematuria.   Neurological: Negative for headaches.     Objective   Vitals:    05/12/22 1233   BP: 134/71   Pulse: 85   Temp: 97.3 °F (36.3 °C)   SpO2: 95%   Weight: 65.1 kg (143 lb 9.6 oz)   Height: 170.2 cm (67\")     Body mass index is 22.49 kg/m².        Vitals reviewed.   Constitutional:       Appearance: Well-developed.   Eyes:      Conjunctiva/sclera: Conjunctivae normal.   HENT:      Head: Normocephalic.   Neck:      Thyroid: No thyromegaly.      Vascular: No JVD.      Trachea: No tracheal deviation.   Pulmonary:      Effort: No respiratory distress.      Breath sounds: Normal breath sounds. No wheezing. No rales.   Cardiovascular:      PMI at left midclavicular line. Normal rate. Regular rhythm. Normal S1. Normal S2.      Murmurs: There " is no murmur.      No gallop. No click. No rub.   Pulses:     Intact distal pulses.   Edema:     Peripheral edema absent.   Abdominal:      General: Bowel sounds are normal.      Palpations: Abdomen is soft. There is no abdominal mass.      Tenderness: There is no abdominal tenderness.   Musculoskeletal:      Cervical back: Normal range of motion and neck supple. Skin:     General: Skin is warm and dry.   Neurological:      Mental Status: Alert and oriented to person, place, and time.      Cranial Nerves: No cranial nerve deficit.       Lab Results   Component Value Date     05/11/2022    K 3.9 05/11/2022     05/11/2022    CO2 22.0 05/11/2022    BUN 11 05/11/2022    CREATININE 1.07 (H) 05/11/2022    GLUCOSE 84 05/11/2022    CALCIUM 9.4 05/11/2022    AST 20 05/11/2022    ALT 14 05/11/2022    ALKPHOS 68 05/11/2022    LABIL2 2.0 02/08/2021     Lab Results   Component Value Date    CKTOTAL 60 01/02/2016     Lab Results   Component Value Date    WBC 11.54 (H) 12/16/2020    HGB 13.4 12/16/2020    HCT 43.4 12/16/2020     12/16/2020     Lab Results   Component Value Date    INR 0.99 06/19/2018    INR <0.90 01/02/2016     Lab Results   Component Value Date    MG 1.7 05/11/2022        Lab Results   Component Value Date    .0 (H) 06/19/2018         ECG 12 Lead    Date/Time: 5/12/2022 12:41 PM  Performed by: Salvador Mccarthy MD  Authorized by: Salvador Mccarthy MD   Comparison: compared with previous ECG from 5/21/2021  Comparison to previous ECG: Patient was in atrial paced rhythm on the previous EKG as against the sinus rhythm on this EKG.  Rhythm: sinus rhythm  BPM: 86  Conduction: conduction normal  ST Segments: ST segments normal  T Waves: T waves normal                Assessment & Plan :   Diagnosis Plan   1.  (ASCVD), s/p stenting in 1999, after an MI, repeat stenting in 2005 of the LAD and RCA at [facility] and stenting of the first OM branch of left circumflex on 6/26/2019.Clinically  stable.     2. SSS (sick sinus syndrome), status post permanent dual-chamber pacemaker implantation in 2011 with recent battery replacement on 12/16/2020 with [employer] medical device.     3. Peripheral arterial disease clinically asymptomatic and stable.     4. Tobacco abuse         Recommendations:  1. Continue with aspirin, rosuvastatin and metoprolol tartrate current doses.  2. I have reemphasized for her to quit smoking.  She said she has tried nicotine patches which caused rash and Chantix which made her loopy.  I told her to at least cut back and then quit.  3. Check lipid panel and CMP.    Return in about 6 months (around 11/12/2022).    As always, Yasmine Reynolds MD  I appreciate very much the opportunity to participate in the cardiovascular care of your patients. Please do not hesitate to call me with any questions with regards to Sirisha STEVENSON José Miguel evaluation and management.       With Best Regards,        Salvador Mccarthy MD, [facility]    Please note that portions of this note were completed with a voice recognition program.

## 2022-05-16 PROCEDURE — 93288 INTERROG EVL PM/LDLS PM IP: CPT | Performed by: INTERNAL MEDICINE

## 2022-05-31 ENCOUNTER — TELEPHONE (OUTPATIENT)
Dept: SURGERY | Facility: CLINIC | Age: 77
End: 2022-05-31

## 2022-06-07 ENCOUNTER — TELEPHONE (OUTPATIENT)
Dept: CARDIOLOGY | Facility: CLINIC | Age: 77
End: 2022-06-07

## 2022-06-07 NOTE — TELEPHONE ENCOUNTER
----- Message from Gus Suero MD sent at 6/6/2022  5:37 PM EDT -----  Please call the patient regarding normal results but hypertensive BP response. Sched OV

## 2022-06-22 ENCOUNTER — TELEPHONE (OUTPATIENT)
Dept: CARDIOLOGY | Facility: CLINIC | Age: 77
End: 2022-06-22

## 2022-07-25 ENCOUNTER — HOSPITAL ENCOUNTER (EMERGENCY)
Facility: HOSPITAL | Age: 77
Discharge: HOME | End: 2022-07-25
Attending: EMERGENCY MEDICINE | Admitting: EMERGENCY MEDICINE

## 2022-07-25 ENCOUNTER — APPOINTMENT (OUTPATIENT)
Dept: ULTRASOUND IMAGING | Facility: HOSPITAL | Age: 77
End: 2022-07-25

## 2022-07-25 VITALS
SYSTOLIC BLOOD PRESSURE: 141 MMHG | BODY MASS INDEX: 25.87 KG/M2 | RESPIRATION RATE: 17 BRPM | HEART RATE: 112 BPM | HEIGHT: 63 IN | OXYGEN SATURATION: 94 % | DIASTOLIC BLOOD PRESSURE: 59 MMHG | TEMPERATURE: 97.1 F | WEIGHT: 146 LBS

## 2022-07-25 DIAGNOSIS — L03.115 CELLULITIS OF RIGHT LOWER EXTREMITY: Primary | ICD-10-CM

## 2022-07-25 PROCEDURE — 93971 EXTREMITY STUDY: CPT

## 2022-07-25 PROCEDURE — 93971 EXTREMITY STUDY: CPT | Performed by: RADIOLOGY

## 2022-07-25 PROCEDURE — 99283 EMERGENCY DEPT VISIT LOW MDM: CPT

## 2022-07-25 PROCEDURE — 93926 LOWER EXTREMITY STUDY: CPT

## 2022-07-25 RX ORDER — AMOXICILLIN AND CLAVULANATE POTASSIUM 875; 125 MG/1; MG/1
1 TABLET, FILM COATED ORAL 2 TIMES DAILY
Qty: 14 TABLET | Refills: 0 | Status: SHIPPED | OUTPATIENT
Start: 2022-07-25 | End: 2022-08-01

## 2022-07-25 RX ORDER — DOXYCYCLINE 100 MG/1
100 CAPSULE ORAL 2 TIMES DAILY
Qty: 14 CAPSULE | Refills: 0 | Status: SHIPPED | OUTPATIENT
Start: 2022-07-25 | End: 2022-08-01

## 2022-07-25 RX ORDER — DOXYCYCLINE 100 MG/1
100 CAPSULE ORAL ONCE
Status: COMPLETED | OUTPATIENT
Start: 2022-07-25 | End: 2022-07-25

## 2022-07-25 RX ADMIN — DOXYCYCLINE 100 MG: 100 CAPSULE ORAL at 20:02

## 2022-07-25 NOTE — ED PROVIDER NOTES
Subjective   Patient complains of right lower leg swelling.  Patient had some biopsies done on her right leg by her PCP 2 weeks ago.  Patient has swelling and tenderness to the back of her leg.  Wants to be checked for a blood clot.  Patient also states that she had bypass in the right leg and is unsure whether it is patent or not.  After discussing with patient she will follow-up with a vascular surgeon and we recommended that she discuss with her PCP.      History provided by:  Patient   used: No    Leg Swelling  Location:  Right calf warmness and tender   Severity:  Moderate  Onset quality:  Sudden  Timing:  Constant  Progression:  Worsening  Chronicity:  New  Relieved by:  Nothing   Worsened by:  Nothing   Ineffective treatments:  None   Associated symptoms: no chest pain, no congestion, no cough, no diarrhea, no ear pain, no fever, no headaches, no nausea, no rash, no shortness of breath, no sore throat, no vomiting and no wheezing        Review of Systems   Constitutional: Negative for chills and fever.   HENT: Negative for congestion, ear pain and sore throat.    Respiratory: Negative for cough, shortness of breath and wheezing.    Cardiovascular: Negative for chest pain.   Gastrointestinal: Negative for diarrhea, nausea and vomiting.   Genitourinary: Negative for dysuria and flank pain.   Musculoskeletal: Positive for arthralgias.   Skin: Negative for rash.   Neurological: Negative for headaches.   Psychiatric/Behavioral: The patient is not nervous/anxious.    All other systems reviewed and are negative.      Past Medical History:   Diagnosis Date   • Anxiety    • Breast cancer (HCC) 11/28/2017    bilateral   • CHF (congestive heart failure) (HCC)    • CKD (chronic kidney disease)    • Coronary artery disease     3 HEART STENTS   • Deep vein thrombosis (HCC)     right lower extremity   • Depression    • Disease of thyroid gland    • Gastroesophageal reflux    • Glaucoma    •  Hyperlipidemia    • Hypertension    • MI (myocardial infarction) ([facility])    • Migraine    • Peripheral arterial disease ([facility])     KIM LEG STENTS       ANDFEM POP   • Peripheral vascular disease ([facility])    • SSS (sick sinus syndrome) ([facility])    • Status post placement of cardiac pacemaker    • Wears dentures    • Wears glasses        Allergies   Allergen Reactions   • Promethazine      hypotension       Past Surgical History:   Procedure Laterality Date   • BREAST LUMPECTOMY Right 12/12/2017    Procedure: BREAST LUMPECTOMY WITH NEEDLE LOCALIZATION;  Surgeon: Lida Babin MD;  Location: [facility] OR;  Service:    • BREAST LUMPECTOMY Bilateral 12/27/2017    Procedure: right breast re-excision  left breast re-excision with 3 cm inferior and superior advancement flap;  Surgeon: Lida Babin MD;  Location: [facility] OR;  Service:    • BREAST LUMPECTOMY WITH SENTINEL NODE BIOPSY AND AXILLARY NODE DISSECTION Left 12/12/2017    Procedure: Left BREAST LUMPECTOMY WITH SENTINEL NODE BIOPSY  Isotope and Lymphazurin injection left breast. Left breast preoperative needle localization;  Surgeon: Lida Babin MD;  Location: [facility];  Service:    • BREAST SURGERY      Mass Removal.   • CARDIAC CATHETERIZATION     • CARDIAC CATHETERIZATION N/A 6/26/2019    Procedure: Left Heart Cath;  Surgeon: Salvador Mccarthy MD;  Location: [facility] CATH INVASIVE LOCATION;  Service: Cardiology   • CARDIAC ELECTROPHYSIOLOGY PROCEDURE Left 12/16/2020    Procedure: PPM generator change - dual;  Surgeon: Salvador Mccarthy MD;  Location: [facility] CATH INVASIVE LOCATION;  Service: Cardiology;  Laterality: Left;   • CARPAL TUNNEL RELEASE     • CATARACT EXTRACTION Bilateral    • FEMORAL DISTAL BYPASS     • FEMORAL FEMORAL BYPASS     • HIP SURGERY Right    • HYSTERECTOMY     • PACEMAKER IMPLANTATION     • IN RT/LT HEART CATHETERS N/A 6/26/2019    Procedure: Percutaneous Coronary Intervention;  Surgeon: Gus Suero MD;  Location: [facility] CATH INVASIVE  LOCATION;  Service: Cardiology   • SHOULDER ARTHROSCOPY W/ ROTATOR CUFF REPAIR Left 3/8/2017    Procedure: LEFT SHOULDER ARTHROSCOPY, ACROMIOPLASTY, SUBACROMIC DEBRIDMENT;  Surgeon: Tesfaye Polo MD;  Location: [facility] OR;  Service:    • SHOULDER SURGERY         Family History   Problem Relation Age of Onset   • Asthma Father    • Cancer Brother    • No Known Problems Mother    • Breast cancer Neg Hx        Social History     Socioeconomic History   • Marital status:    • Number of children: 4   Tobacco Use   • Smoking status: Current Every Day Smoker     Packs/day: 1.00     Years: 54.00     Pack years: 54.00     Types: Cigarettes     Start date: 1961   • Smokeless tobacco: Never Used   Vaping Use   • Vaping Use: Never used   Substance and Sexual Activity   • Alcohol use: No   • Drug use: No   • Sexual activity: Defer           Objective   Physical Exam  Vitals and nursing note reviewed.   Constitutional:       Appearance: She is well-developed.   HENT:      Head: Normocephalic.   Cardiovascular:      Rate and Rhythm: Normal rate and regular rhythm.   Pulmonary:      Effort: Pulmonary effort is normal.      Breath sounds: Normal breath sounds.   Abdominal:      General: Bowel sounds are normal.      Palpations: Abdomen is soft.      Tenderness: There is no abdominal tenderness.   Musculoskeletal:         General: Normal range of motion.      Cervical back: Neck supple.   Skin:     General: Skin is warm and dry.      Comments: Swelling and erythema to right lower leg   Neg homans sign   Venous stasis to both lower extremities.   Decreased pulses bilaterally    Neurological:      Mental Status: She is alert and oriented to person, place, and time.   Psychiatric:         Behavior: Behavior normal.         Thought Content: Thought content normal.         Judgment: Judgment normal.         Procedures           ED Course  ED Course as of 07/25/22 2253 Mon Jul 25, 2022 2241 Discussed results with   Ramirez who also eval patient. Will have pt f/u with pcp for referral to vascular surgery [LC]      ED Course User Index  [LC] Dori Dinh PA                                           [facility]    Final diagnoses:   Cellulitis of right lower extremity       ED Disposition  ED Disposition     ED Disposition   Discharge    Condition   Stable    Comment   --             Yasmine Reynolds MD  [address]   [facility] [id number] [phone]    In 2 days      Ras Esposito MD  [address]  [place] [id number] [phone]    In 2 days           Medication List      New Prescriptions    amoxicillin-clavulanate 875-125 MG per tablet  Commonly known as: AUGMENTIN  Take 1 tablet by mouth 2 (Two) Times a Day for 7 days.     doxycycline 100 MG capsule  Commonly known as: MONODOX  Take 1 capsule by mouth 2 (Two) Times a Day for 7 days.           Where to Get Your Medications      These medications were sent to [facility]/pharmacy #[id number] - [place], [place] - [facility] [address] [facility] - [phone]  - [phone] [address] [address]    Phone: [phone]   · amoxicillin-clavulanate 875-125 MG per tablet  · doxycycline 100 MG capsule          Dori Dinh PA  07/25/22 6809

## 2022-07-26 ENCOUNTER — TELEPHONE (OUTPATIENT)
Dept: CARDIOLOGY | Facility: CLINIC | Age: 77
End: 2022-07-26

## 2022-07-26 NOTE — TELEPHONE ENCOUNTER
Patient called and stated she seen in the ER they recommenced she see  or other vascular surgeon but she does not to see him.  She asks that her ER note be reviewed and be advised on what to do

## 2022-07-27 ENCOUNTER — TELEPHONE (OUTPATIENT)
Dept: SURGERY | Facility: CLINIC | Age: 77
End: 2022-07-27

## 2022-07-27 ENCOUNTER — TELEPHONE (OUTPATIENT)
Dept: CARDIOLOGY | Facility: CLINIC | Age: 77
End: 2022-07-27

## 2022-07-27 NOTE — TELEPHONE ENCOUNTER
Patient called and said she needs someone to call her back about her test she had done in the er. She says they done two ultra sounds and told her to call here about the results.     Thanks!

## 2022-07-29 RX ORDER — CILOSTAZOL 50 MG/1
50 TABLET ORAL 2 TIMES DAILY
Qty: 60 TABLET | Refills: 5 | Status: SHIPPED | OUTPATIENT
Start: 2022-07-29 | End: 2022-10-11 | Stop reason: SDUPTHER

## 2022-08-23 ENCOUNTER — OFFICE VISIT (OUTPATIENT)
Dept: CARDIAC SURGERY | Facility: CLINIC | Age: 77
End: 2022-08-23

## 2022-08-23 VITALS
SYSTOLIC BLOOD PRESSURE: 120 MMHG | TEMPERATURE: 96.9 F | HEART RATE: 70 BPM | DIASTOLIC BLOOD PRESSURE: 70 MMHG | BODY MASS INDEX: 24.63 KG/M2 | WEIGHT: 139 LBS | OXYGEN SATURATION: 94 % | HEIGHT: 63 IN

## 2022-08-23 DIAGNOSIS — I70.213 ATHEROSCLEROSIS OF NATIVE ARTERY OF BOTH LOWER EXTREMITIES WITH INTERMITTENT CLAUDICATION: Primary | ICD-10-CM

## 2022-08-23 DIAGNOSIS — I73.9 PVD (PERIPHERAL VASCULAR DISEASE): Primary | ICD-10-CM

## 2022-08-23 PROCEDURE — 99215 OFFICE O/P EST HI 40 MIN: CPT | Performed by: NURSE PRACTITIONER

## 2022-08-23 RX ORDER — CEPHALEXIN 500 MG/1
500 CAPSULE ORAL 2 TIMES DAILY
Qty: 28 CAPSULE | Refills: 0 | Status: SHIPPED | OUTPATIENT
Start: 2022-08-23 | End: 2023-03-20

## 2022-08-23 RX ORDER — POTASSIUM CHLORIDE 750 MG/1
10 TABLET, EXTENDED RELEASE ORAL DAILY
COMMUNITY
Start: 2022-08-10

## 2022-08-23 NOTE — PROGRESS NOTES
"     [facility] Cardiothoracic Surgery Office Follow Up Note     Date of Encounter: 2022     Name: Sirisha Valdez  : 1945     Referred By: No ref. provider found  PCP: Yasmine Reynolds MD    Chief Complaint:    Chief Complaint   Patient presents with   • Follow-up     Current Patient Ref by Dr. Yasmine Reynolds for PVD       Subjective      History of Present Illness:    Sirisha Valdez is a 76 y.o. female current smoker with history of breast cancer s/p resection, HTN, HLD on statin therapy, CKD III, CAD with MI s/p stenting , , & , SSS s/p PPM, CHF, PVD s/p femorofemoral bypass at [facility] who was initially referred to Dr. Esposito and seen 6/15/2021 for BLE intermittent claudication pain and weakness.  Arterial duplex performed demonstrated occlusion to femorofemoral bypass.  Pletal was initially contraindicated given her CHF and structured walking regiment recommended with plans to follow-up in 3 months for eval. However, has now been prescribed by her cardiologist Dr Mccarthy.  She was lost to care and is being re-referred by her PCP for reevaluation. Pt had  ED visit 2022 for RLE swelling s/p skin biopsies by PCP at beginning of the month. Duplex negative for DVT. She then had a fall with \"skin tear\" in the area of her biopsy site and has been seen by wound care. She has a history of delayed wound healing and family has noted dusky discoloration to her extremities. She does not particularly complain of intermittent claudication. She takes daily ASA and statin therapy but was told several years after her fem-fem bypass that she no longer needed plavix.  Unfortunately, she continues to smoke.    Review of Systems:  Review of Systems   Constitutional: Negative for chills, decreased appetite, diaphoresis, fever, malaise/fatigue, night sweats, weight gain and weight loss.   HENT: Negative for hoarse voice.    Eyes: Negative for blurred vision, double vision and visual " disturbance.   Cardiovascular: Positive for leg swelling. Negative for chest pain, claudication, dyspnea on exertion, irregular heartbeat, near-syncope, orthopnea, palpitations, paroxysmal nocturnal dyspnea and syncope.   Respiratory: Positive for cough and wheezing. Negative for hemoptysis, shortness of breath and sputum production.    Hematologic/Lymphatic: Negative for adenopathy and bleeding problem. Bruises/bleeds easily.   Skin: Positive for poor wound healing. Negative for color change, nail changes and rash.   Musculoskeletal: Positive for falls. Negative for back pain and muscle cramps.   Gastrointestinal: Negative for abdominal pain, dysphagia and heartburn.   Genitourinary: Negative for flank pain.   Neurological: Positive for dizziness and light-headedness. Negative for brief paralysis, disturbances in coordination, focal weakness, headaches, loss of balance, numbness, paresthesias, sensory change, vertigo and weakness.   Psychiatric/Behavioral: Positive for depression. Negative for suicidal ideas. The patient is nervous/anxious.    Allergic/Immunologic: Negative for persistent infections.       I have reviewed the following portions of the patient's history: allergies, current medications, past family history, past medical history, past social history, past surgical history and problem list and confirm it's accurate.    Allergies:  Allergies   Allergen Reactions   • Promethazine      hypotension       Medications:      Current Outpatient Medications:   •  albuterol (PROAIR RESPICLICK) 108 (90 Base) MCG/ACT inhaler, Inhale Every 4 (Four) Hours As Needed for Wheezing., Disp: , Rfl:   •  anastrozole (ARIMIDEX) 1 MG tablet, Take 1 mg by mouth Daily., Disp: , Rfl:   •  aspirin 81 MG EC tablet, Take 81 mg by mouth daily., Disp: , Rfl:   •  busPIRone (BUSPAR) 30 MG tablet, Take 15 mg by mouth 2 (Two) Times a Day As Needed (anxiety)., Disp: , Rfl:   •  cilostazol (PLETAL) 50 MG tablet, Take 1 tablet by mouth 2  (Two) Times a Day., Disp: 60 tablet, Rfl: 5  •  diazePAM (VALIUM) 10 MG tablet, Take 10 mg by mouth 2 (Two) Times a Day As Needed for Anxiety., Disp: , Rfl:   •  HYDROcodone-acetaminophen (NORCO) 7.5-325 MG per tablet, Take 1 tablet by mouth Every 6 (Six) Hours As Needed for Moderate Pain ., Disp: , Rfl:   •  KLOR-CON 10 MEQ CR tablet, Take 10 mEq by mouth Daily., Disp: , Rfl:   •  levothyroxine (SYNTHROID, LEVOTHROID) 25 MCG tablet, , Disp: , Rfl:   •  metoprolol tartrate (LOPRESSOR) 25 MG tablet, TAKE 1 TABLET TWICE DAILY, Disp: 180 tablet, Rfl: 2  •  mupirocin (BACTROBAN) 2 % ointment, Apply  topically to the appropriate area as directed 3 (Three) Times a Day., Disp: , Rfl:   •  nitroglycerin (NITROSTAT) 0.4 MG SL tablet, Place 0.4 mg under the tongue Every 5 (Five) Minutes As Needed for Chest Pain. Take no more than 3 doses in 15 minutes., Disp: , Rfl:   •  NON FORMULARY, Antibiotic cream, Disp: , Rfl:   •  pantoprazole (PROTONIX) 20 MG EC tablet, Take 20 mg by mouth Daily., Disp: , Rfl:   •  potassium chloride 10 MEQ CR tablet, Take 1 tablet by mouth Daily., Disp: 30 tablet, Rfl: 3  •  rosuvastatin (CRESTOR) 40 MG tablet, Take 1 tablet by mouth Every Night., Disp: 90 tablet, Rfl: 2  •  cephalexin (Keflex) 500 MG capsule, Take 1 capsule by mouth 2 (Two) Times a Day., Disp: 28 capsule, Rfl: 0    History:   Past Medical History:   Diagnosis Date   • Anxiety    • Breast cancer (HCC) 11/28/2017    bilateral   • CHF (congestive heart failure) (HCC)    • CKD (chronic kidney disease)    • Coronary artery disease     3 HEART STENTS   • Deep vein thrombosis (HCC)     right lower extremity   • Depression    • Disease of thyroid gland    • Gastroesophageal reflux    • Glaucoma    • Hyperlipidemia    • Hypertension    • MI (myocardial infarction) (HCC)    • Migraine    • Peptic ulcer disease    • Peripheral arterial disease (HCC)     KIM LEG STENTS       ANDFEM POP   • Peripheral vascular disease (HCC)    • SSS (sick sinus  syndrome) (HCC)    • Status post placement of cardiac pacemaker    • Wears dentures    • Wears glasses        Past Surgical History:   Procedure Laterality Date   • BREAST LUMPECTOMY Right 12/12/2017    Procedure: BREAST LUMPECTOMY WITH NEEDLE LOCALIZATION;  Surgeon: Lida Babin MD;  Location: [facility];  Service:    • BREAST LUMPECTOMY Bilateral 12/27/2017    Procedure: right breast re-excision  left breast re-excision with 3 cm inferior and superior advancement flap;  Surgeon: Lida Babin MD;  Location: [facility] OR;  Service:    • BREAST LUMPECTOMY WITH SENTINEL NODE BIOPSY AND AXILLARY NODE DISSECTION Left 12/12/2017    Procedure: Left BREAST LUMPECTOMY WITH SENTINEL NODE BIOPSY  Isotope and Lymphazurin injection left breast. Left breast preoperative needle localization;  Surgeon: Lida Babin MD;  Location: [facility];  Service:    • BREAST SURGERY      Mass Removal.   • CARDIAC CATHETERIZATION     • CARDIAC CATHETERIZATION N/A 06/26/2019    Procedure: Left Heart Cath;  Surgeon: Salvador Mccarthy MD;  Location: [facility] CATH INVASIVE LOCATION;  Service: Cardiology   • CARDIAC ELECTROPHYSIOLOGY PROCEDURE Left 12/16/2020    Procedure: PPM generator change - dual;  Surgeon: Salvador Mccarthy MD;  Location: [facility] CATH INVASIVE LOCATION;  Service: Cardiology;  Laterality: Left;   • CARPAL TUNNEL RELEASE     • CATARACT EXTRACTION Bilateral    • ESOPHAGUS SURGERY     • FEMORAL DISTAL BYPASS     • FEMORAL FEMORAL BYPASS     • HIP SURGERY Right    • HYSTERECTOMY     • PACEMAKER IMPLANTATION     • PA RT/LT HEART CATHETERS N/A 06/26/2019    Procedure: Percutaneous Coronary Intervention;  Surgeon: Gus Suero MD;  Location: [facility] CATH INVASIVE LOCATION;  Service: Cardiology   • SHOULDER ARTHROSCOPY W/ ROTATOR CUFF REPAIR Left 03/08/2017    Procedure: LEFT SHOULDER ARTHROSCOPY, ACROMIOPLASTY, SUBACROMIC DEBRIDMENT;  Surgeon: Tesfaye Polo MD;  Location: [facility] OR;  Service:    • SHOULDER SURGERY   "       Social History     Socioeconomic History   • Marital status:    • Number of children: 4   Tobacco Use   • Smoking status: Current Every Day Smoker     Packs/day: 1.00     Years: 54.00     Pack years: 54.00     Types: Cigarettes     Start date:    • Smokeless tobacco: Never Used   Vaping Use   • Vaping Use: Never used   Substance and Sexual Activity   • Alcohol use: No   • Drug use: No   • Sexual activity: Defer        Family History   Problem Relation Age of Onset   • Asthma Father    • Cancer Brother    • No Known Problems Mother    • Breast cancer Neg Hx        Objective     Physical Exam:  Vitals:    22 1057 22 1058   BP: 130/80 120/70   BP Location: Left arm Right arm   Patient Position: Sitting Sitting   Pulse: 70    Temp: 96.9 °F (36.1 °C)    SpO2: 94%    Weight: 63 kg (139 lb)    Height: 160 cm (63\")       Body mass index is 24.62 kg/m².    Physical Exam  Constitutional:       Appearance: Normal appearance.   Cardiovascular:      Rate and Rhythm: Normal rate.      Pulses:           Dorsalis pedis pulses are detected w/ Doppler on the right side and detected w/ Doppler on the left side.        Posterior tibial pulses are detected w/ Doppler on the right side and detected w/ Doppler on the left side.      Comments: Easily dopplerable, biphasic   Pulmonary:      Effort: Pulmonary effort is normal.   Musculoskeletal:      Right lower le+ Edema present.      Left lower le+ Edema present.   Feet:      Comments: Toes with dusky discoloration bilaterally.  Delayed cap refill  Left foot second digit ecchymosis  Skin:     General: Skin is warm and dry.      Capillary Refill: Capillary refill takes more than 3 seconds.      Comments: Right 3 to ulceration/skin tear 5-6 cm with eschar, surrounding erythema   Neurological:      Mental Status: She is alert and oriented to person, place, and time. Mental status is at baseline.             Imaging/Labs:  US Arterial Doppler Lower " "Extremity Right-Result Date: 7/25/2022  Femoral-femoral bypass graft appears to be occluded without demonstratable flow, as also noted on prior ultrasound 6/4/2021. Signer Name: Marcin Fu MD  Signed: 7/25/2022 6:46 PM  Workstation Name: [id number]  [facility] Venous Doppler Lower Extremity Right (duplex)-Result Date: 7/25/2022  No sonographic findings of the DVT in the right lower extremity. Patient's femoral-femoral bypass graft shows no demonstratable flow  This report was finalized on 7/25/2022 3:16 PM by Dr. Randy Kay II, MD.         Assessment / Plan      Assessment / Plan:  Diagnoses and all orders for this visit:    1. PVD (peripheral vascular disease) (HCC) (Primary)    Other orders  -     cephalexin (Keflex) 500 MG capsule; Take 1 capsule by mouth 2 (Two) Times a Day.  Dispense: 28 capsule; Refill: 0       · PVD s/p femorofemoral bypass at [facility], initially referred to Dr. Esposito and seen 6/15/2021 for BLE intermittent claudication pain and weakness.    · Arterial duplex performed demonstrated occlusion to femorofemoral bypass.  Treated with medical management  · Lost to care  · s/p RLE skin biopsies (negative per patient report data deficient) by PCP 7/2022 with subsequent traumatic \"skin tear\" in the area of her biopsy site  · Large ulcerative skin tear to right pretibial area with eschar and surrounding erythema  · Followed by wound care with topical antibiotic treatment.  Will provide eRX  · Pt had another arterial ultrasound demonstrating fem-fem graft occlusion. Pt has had no further vascular imaging. Will obtain CTA with runoff and recon for further vascular characterization   · Follow-up in 2 weeks to discuss results and wound check  · Discussed importance of protective footwear and diligent foot exams  · Stressed the importance of smoking cessation and the risk for limb loss    Patient Education:  Leticiaprem STEVENSON José Miguel  reports that she has been smoking " cigarettes. She started smoking about 61 years ago. She has a 54.00 pack-year smoking history. She has never used smokeless tobacco.. I have educated her on the risk of diseases from using tobacco products such as cancer, COPD and heart disease. I advised her to quit and she is not willing to quit.I spent 3  minutes counseling the patient.      Follow Up:   Return in about 2 weeks (around 9/6/2022) for Imaging next visit, Wound check.   Or sooner for any further concerns or worsening sign and symptoms. If unable to reach us in the office please dial 911 or go to the nearest emergency department.      Kathleen Dennis [facility] Cardiothoracic Surgery    Time Spent: I spent 48 minutes caring for Sirisha on this date of service. This time includes time spent by me in the following activities: preparing for the visit, reviewing tests, obtaining and/or reviewing a separately obtained history, performing a medically appropriate examination and/or evaluation, counseling and educating the patient/family/caregiver, ordering medications, tests, or procedures, referring and communicating with other health care professionals, documenting information in the medical record, independently interpreting results and communicating that information with the patient/family/caregiver and care coordination.

## 2022-10-11 RX ORDER — CILOSTAZOL 50 MG/1
50 TABLET ORAL 2 TIMES DAILY
Qty: 60 TABLET | Refills: 5 | Status: SHIPPED | OUTPATIENT
Start: 2022-10-11 | End: 2023-02-09

## 2022-10-11 NOTE — TELEPHONE ENCOUNTER
Caller: Sirisha Valdez    Relationship: Self    Best call back number: [phone]    Requested Prescriptions:   Requested Prescriptions     Pending Prescriptions Disp Refills   • cilostazol (PLETAL) 50 MG tablet 60 tablet 5     Sig: Take 1 tablet by mouth 2 (Two) Times a Day.        Pharmacy where request should be sent: [facility]/PHARMACY #[id number] [address] [phone] [facility] [phone] FX     Additional details provided by patient: PT HAS LESS THAN A 3 DAY SUPPLY     Does the patient have less than a 3 day supply:  [x] Yes  [] No    Stella Cunningham Rep   10/11/22 13:40 EDT

## 2022-10-31 ENCOUNTER — HOSPITAL ENCOUNTER (OUTPATIENT)
Dept: CT IMAGING | Facility: HOSPITAL | Age: 77
Discharge: HOME | End: 2022-10-31
Admitting: NURSE PRACTITIONER

## 2022-10-31 DIAGNOSIS — I70.213 ATHEROSCLEROSIS OF NATIVE ARTERY OF BOTH LOWER EXTREMITIES WITH INTERMITTENT CLAUDICATION: ICD-10-CM

## 2022-10-31 LAB — CREAT BLDA-MCNC: 1 MG/DL (ref 0.6–1.3)

## 2022-10-31 PROCEDURE — 0 IOVERSOL 74 % SOLUTION: Performed by: NURSE PRACTITIONER

## 2022-10-31 PROCEDURE — 75635 CT ANGIO ABDOMINAL ARTERIES: CPT | Performed by: RADIOLOGY

## 2022-10-31 PROCEDURE — 82565 ASSAY OF CREATININE: CPT

## 2022-10-31 PROCEDURE — 75635 CT ANGIO ABDOMINAL ARTERIES: CPT

## 2022-10-31 RX ADMIN — IOVERSOL 100 ML: 741 INJECTION INTRA-ARTERIAL; INTRAVENOUS at 12:34

## 2022-11-01 ENCOUNTER — OFFICE VISIT (OUTPATIENT)
Dept: CARDIAC SURGERY | Facility: CLINIC | Age: 77
End: 2022-11-01

## 2022-11-01 VITALS
SYSTOLIC BLOOD PRESSURE: 148 MMHG | WEIGHT: 132 LBS | BODY MASS INDEX: 23.39 KG/M2 | DIASTOLIC BLOOD PRESSURE: 65 MMHG | HEART RATE: 86 BPM | HEIGHT: 63 IN | TEMPERATURE: 98.4 F | OXYGEN SATURATION: 96 %

## 2022-11-01 DIAGNOSIS — I73.9 PVD (PERIPHERAL VASCULAR DISEASE): Primary | ICD-10-CM

## 2022-11-01 PROCEDURE — 99213 OFFICE O/P EST LOW 20 MIN: CPT | Performed by: NURSE PRACTITIONER

## 2022-11-01 NOTE — PROGRESS NOTES
"     [facility] Cardiothoracic Surgery Office Follow Up Note     Date of Encounter: 2022     Name: Sirisha Valdez  : 1945     Referred By: No ref. provider found  PCP: Yasmine Reynolds MD    Chief Complaint:    Chief Complaint   Patient presents with   • Follow-up     Follow up for peripheral vascular disease.       Subjective      History of Present Illness:    Sirisha Valdez is a 77 y.o. female current smoker with history of breast cancer s/p resection, HTN, HLD on statin therapy, CKD III, CAD with MI s/p stenting , , & , SSS s/p PPM, CHF, PVD s/p femorofemoral bypass at [facility] who was initially referred to Dr. Esposito and seen 6/15/2021 for BLE intermittent claudication pain and weakness.  Arterial duplex performed demonstrated occlusion to femorofemoral bypass.  Pletal was initially contraindicated given her CHF and structured walking regiment recommended with plans to follow-up in 3 months for eval. Pletal however has now been prescribed by her cardiologist Dr Mccarthy.  She was lost to care and is being re-referred by her PCP for reevaluation. Pt had  ED visit 2022 for RLE swelling s/p skin biopsies by PCP at beginning of the month. Duplex negative for DVT. She then had a fall with \"skin tear\" in the area of her biopsy site and was placed on Keflex. She since has had complete healing of this site. She has a history of delayed wound healing and family has noted dusky discoloration to her extremities. She has no complain of intermittent claudication or rest pain and she is able to keep house and do her shopping without complaints. She takes daily ASA and statin therapy but was told several years after her fem-fem bypass that she no longer needed plavix.  Unfortunately, she continues to smoke.    Review of Systems:  Review of Systems   Constitutional: Positive for weight loss. Negative for chills, decreased appetite, diaphoresis, fever, malaise/fatigue, night sweats " and weight gain.   HENT: Negative.  Negative for hoarse voice.    Eyes: Negative.  Negative for blurred vision, double vision and visual disturbance.   Cardiovascular: Negative.  Negative for chest pain, claudication, dyspnea on exertion, irregular heartbeat, leg swelling, near-syncope, orthopnea, palpitations, paroxysmal nocturnal dyspnea and syncope.   Respiratory: Negative.  Negative for cough, hemoptysis, shortness of breath, sputum production and wheezing.    Hematologic/Lymphatic: Negative for adenopathy and bleeding problem. Bruises/bleeds easily.   Skin: Positive for color change. Negative for nail changes, poor wound healing and rash.   Musculoskeletal: Positive for falls and muscle cramps (toes). Negative for back pain.   Gastrointestinal: Negative.  Negative for abdominal pain, dysphagia and heartburn.   Genitourinary: Negative.  Negative for flank pain.   Neurological: Positive for loss of balance. Negative for brief paralysis, disturbances in coordination, dizziness, focal weakness, headaches, light-headedness, numbness, paresthesias, sensory change, vertigo and weakness.   Psychiatric/Behavioral: Negative for depression and suicidal ideas. The patient is nervous/anxious.    Allergic/Immunologic: Negative for persistent infections.       I have reviewed the following portions of the patient's history: allergies, current medications, past family history, past medical history, past social history, past surgical history and problem list and confirm it's accurate.    Allergies:  Allergies   Allergen Reactions   • Promethazine      hypotension       Medications:      Current Outpatient Medications:   •  albuterol (PROAIR RESPICLICK) 108 (90 Base) MCG/ACT inhaler, Inhale Every 4 (Four) Hours As Needed for Wheezing., Disp: , Rfl:   •  anastrozole (ARIMIDEX) 1 MG tablet, Take 1 mg by mouth Daily., Disp: , Rfl:   •  aspirin 81 MG EC tablet, Take 81 mg by mouth daily., Disp: , Rfl:   •  busPIRone (BUSPAR) 30 MG  tablet, Take 15 mg by mouth 2 (Two) Times a Day As Needed (anxiety)., Disp: , Rfl:   •  cephalexin (Keflex) 500 MG capsule, Take 1 capsule by mouth 2 (Two) Times a Day., Disp: 28 capsule, Rfl: 0  •  cilostazol (PLETAL) 50 MG tablet, Take 1 tablet by mouth 2 (Two) Times a Day., Disp: 60 tablet, Rfl: 5  •  diazePAM (VALIUM) 10 MG tablet, Take 10 mg by mouth 2 (Two) Times a Day As Needed for Anxiety., Disp: , Rfl:   •  HYDROcodone-acetaminophen (NORCO) 7.5-325 MG per tablet, Take 1 tablet by mouth Every 6 (Six) Hours As Needed for Moderate Pain ., Disp: , Rfl:   •  KLOR-CON 10 MEQ CR tablet, Take 10 mEq by mouth Daily., Disp: , Rfl:   •  levothyroxine (SYNTHROID, LEVOTHROID) 25 MCG tablet, , Disp: , Rfl:   •  metoprolol tartrate (LOPRESSOR) 25 MG tablet, TAKE 1 TABLET TWICE DAILY, Disp: 180 tablet, Rfl: 2  •  mupirocin (BACTROBAN) 2 % ointment, Apply  topically to the appropriate area as directed 3 (Three) Times a Day., Disp: , Rfl:   •  nitroglycerin (NITROSTAT) 0.4 MG SL tablet, Place 0.4 mg under the tongue Every 5 (Five) Minutes As Needed for Chest Pain. Take no more than 3 doses in 15 minutes., Disp: , Rfl:   •  NON FORMULARY, Antibiotic cream, Disp: , Rfl:   •  pantoprazole (PROTONIX) 20 MG EC tablet, Take 20 mg by mouth Daily., Disp: , Rfl:   •  potassium chloride 10 MEQ CR tablet, Take 1 tablet by mouth Daily., Disp: 30 tablet, Rfl: 3  •  rosuvastatin (CRESTOR) 40 MG tablet, Take 1 tablet by mouth Every Night., Disp: 90 tablet, Rfl: 2    History:   Past Medical History:   Diagnosis Date   • Anxiety    • Breast cancer (HCC) 11/28/2017    bilateral   • CHF (congestive heart failure) (HCC)    • CKD (chronic kidney disease)    • Coronary artery disease     3 HEART STENTS   • Deep vein thrombosis (HCC)     right lower extremity   • Depression    • Disease of thyroid gland    • Gastroesophageal reflux    • Glaucoma    • Hyperlipidemia    • Hypertension    • MI (myocardial infarction) (HCC)    • Migraine    • Peptic  ulcer disease    • Peripheral arterial disease (HCC)     KIM LEG STENTS       ANDFEM POP   • Peripheral vascular disease (HCC)    • SSS (sick sinus syndrome) ([facility])    • Status post placement of cardiac pacemaker    • Wears dentures    • Wears glasses        Past Surgical History:   Procedure Laterality Date   • BREAST LUMPECTOMY Right 12/12/2017    Procedure: BREAST LUMPECTOMY WITH NEEDLE LOCALIZATION;  Surgeon: Lida Babin MD;  Location: [facility] OR;  Service:    • BREAST LUMPECTOMY Bilateral 12/27/2017    Procedure: right breast re-excision  left breast re-excision with 3 cm inferior and superior advancement flap;  Surgeon: Lida Babin MD;  Location: [facility] OR;  Service:    • BREAST LUMPECTOMY WITH SENTINEL NODE BIOPSY AND AXILLARY NODE DISSECTION Left 12/12/2017    Procedure: Left BREAST LUMPECTOMY WITH SENTINEL NODE BIOPSY  Isotope and Lymphazurin injection left breast. Left breast preoperative needle localization;  Surgeon: Lida Babin MD;  Location: [facility] OR;  Service:    • BREAST SURGERY      Mass Removal.   • CARDIAC CATHETERIZATION     • CARDIAC CATHETERIZATION N/A 06/26/2019    Procedure: Left Heart Cath;  Surgeon: Salvador Mccarthy MD;  Location: [facility] CATH INVASIVE LOCATION;  Service: Cardiology   • CARDIAC ELECTROPHYSIOLOGY PROCEDURE Left 12/16/2020    Procedure: PPM generator change - dual;  Surgeon: Salvador Mccarthy MD;  Location: [facility] CATH INVASIVE LOCATION;  Service: Cardiology;  Laterality: Left;   • CARPAL TUNNEL RELEASE     • CATARACT EXTRACTION Bilateral    • ESOPHAGUS SURGERY     • FEMORAL DISTAL BYPASS     • FEMORAL FEMORAL BYPASS     • HIP SURGERY Right    • HYSTERECTOMY     • PACEMAKER IMPLANTATION     • UT RT/LT HEART CATHETERS N/A 06/26/2019    Procedure: Percutaneous Coronary Intervention;  Surgeon: Gus Suero MD;  [facility] CATH INVASIVE [facility];  Service: Cardiology   • SHOULDER ARTHROSCOPY W/ ROTATOR CUFF REPAIR Left 03/08/2017    Procedure: LEFT  "SHOULDER ARTHROSCOPY, ACROMIOPLASTY, SUBACROMIC DEBRIDMENT;  Surgeon: Tesfaye Polo MD;  Location: [facility];  Service:    • SHOULDER SURGERY         Social History     Socioeconomic History   • Marital status:    • Number of children: 4   Tobacco Use   • Smoking status: Every Day     Packs/day: 1.00     Years: 54.00     Pack years: 54.00     Types: Cigarettes     Start date:    • Smokeless tobacco: Never   Vaping Use   • Vaping Use: Never used   Substance and Sexual Activity   • Alcohol use: No   • Drug use: No   • Sexual activity: Defer        Family History   Problem Relation Age of Onset   • Asthma Father    • Cancer Brother    • No Known Problems Mother    • Breast cancer Neg Hx        Objective     Physical Exam:  Vitals:    22 1320 22 1321   BP: 124/55 148/65   BP Location: Right arm Left arm   Patient Position: Sitting Sitting   Pulse: 86    Temp: 98.4 °F (36.9 °C)    SpO2: 96%    Weight: 59.9 kg (132 lb)    Height: 160 cm (63\")       Body mass index is 23.38 kg/m².    Physical Exam  Constitutional:       Appearance: Normal appearance.   Cardiovascular:      Rate and Rhythm: Normal rate.      Pulses:           Dorsalis pedis pulses are detected w/ Doppler on the right side and 1+ on the left side.        Posterior tibial pulses are detected w/ Doppler on the right side and detected w/ Doppler on the left side.      Comments: Easily dopplerable, biphasic   Pulmonary:      Effort: Pulmonary effort is normal.   Musculoskeletal:      Right lower le+ Edema present.      Left lower le+ Edema present.   Feet:      Right foot:      Skin integrity: Dry skin present.      Toenail Condition: Right toenails are abnormally thick and long.      Left foot:      Skin integrity: Dry skin present.      Toenail Condition: Left toenails are abnormally thick and long.      Comments: Right foot 2nd digit with dusky discoloration  Skin:     General: Skin is warm and dry.      Capillary " "Refill: Capillary refill takes more than 3 seconds.   Neurological:      Mental Status: She is alert and oriented to person, place, and time. Mental status is at baseline.                 Imaging/Labs:  CT Angio Abdominal Aorta Bilateral Iliofem Runoff-Result Date: 10/31/2022  1. Occlusion of the right common iliac, external iliac, and common femoral arteries. 2. No other occlusive segments were identified. 3. Poor runoff to both ankles, likely in part due to timing. 4. Other findings as above.  This report was finalized on 10/31/2022 2:13 PM by Dr. Eulalio Fair MD.             Assessment / Plan      Assessment / Plan:  Diagnoses and all orders for this visit:    1. PVD (peripheral vascular disease) ([facility]) (Primary)         • PVD s/p femorofemoral bypass at [facility]  • initially referred to Dr. Esposito and seen 6/15/2021 for BLE intermittent claudication pain and weakness.    • Arterial duplex performed demonstrated occlusion to femorofemoral bypass.  Treated with medical management  • Lost to care  • s/p RLE skin biopsies (negative per patient report data deficient) by PCP 7/2022 with subsequent traumatic \"skin tear\" in the area of her biopsy site  • Large ulcerative skin tear to right pretibial area with eschar and surrounding erythema tx with Keflex  • Since has had resolution of wound with no new developments  • No IC or rest pain. Able to complete ADLs such as house work and shopping  • CTA confirms severe aortoiliac disease with occluded bypass  • She does have dopplerable pedal pulses and actually left DP is surprisingly palpable. There is noted dusky discoloration to right 2nd digit and more discoloration develops throughout cold exposure from exam  • As she is currently denying any symptoms and has no evidence of tissue compromise will continue conservatively   • Discussed importance of protective footwear and diligent foot exams. She knows to contact our office fore development of new " wounds/ulcers  • Stressed the importance of smoking cessation and the risk for limb loss  • Encouraged structured walking regiment    Patient Education:  A structured walking regiment is used to improve the circulation or blood flow in your legs. Recognize that walking may hurt at first - and that is good. In fact, the goal is to walk at a pace that causes mild or moderate pain or tightness in your legs. Pace yourself, stop to rest for a few minutes, but then resume walking. This discomfort triggers your body to improve your circulation. Repeat several times: Walk at a pace that causes mild or moderate leg pain, then rest, and keep going. Over time, you may find you are able to walk longer with less pain. That is a sign that your blood vessels are recovering. It may take months, so be patient and persistent.       Follow Up:   Return in about 3 months (around 2/1/2023) for F/U Kathleen.   Or sooner for any further concerns or worsening sign and symptoms. If unable to reach us in the office please dial 911 or go to the nearest emergency department.      Kathleen AVERY  [facility] Cardiothoracic Surgery    Time Spent: I spent 25 minutes caring for Sirisha on this date of service. This time includes time spent by me in the following activities: preparing for the visit, reviewing tests, obtaining and/or reviewing a separately obtained history, performing a medically appropriate examination and/or evaluation, counseling and educating the patient/family/caregiver, ordering medications, tests, or procedures, documenting information in the medical record, independently interpreting results and communicating that information with the patient/family/caregiver and care coordination.

## 2023-02-07 DIAGNOSIS — Z17.0 MALIGNANT NEOPLASM OF CENTRAL PORTION OF BOTH BREASTS IN FEMALE, ESTROGEN RECEPTOR POSITIVE: ICD-10-CM

## 2023-02-07 DIAGNOSIS — Z78.0 POST-MENOPAUSE: Primary | ICD-10-CM

## 2023-02-07 DIAGNOSIS — C50.111 MALIGNANT NEOPLASM OF CENTRAL PORTION OF BOTH BREASTS IN FEMALE, ESTROGEN RECEPTOR POSITIVE: ICD-10-CM

## 2023-02-07 DIAGNOSIS — C50.112 MALIGNANT NEOPLASM OF CENTRAL PORTION OF BOTH BREASTS IN FEMALE, ESTROGEN RECEPTOR POSITIVE: ICD-10-CM

## 2023-02-09 ENCOUNTER — TELEPHONE (OUTPATIENT)
Dept: CARDIOLOGY | Facility: CLINIC | Age: 78
End: 2023-02-09
Payer: MEDICARE

## 2023-02-09 RX ORDER — CILOSTAZOL 50 MG/1
TABLET ORAL
Qty: 60 TABLET | Refills: 5 | Status: SHIPPED | OUTPATIENT
Start: 2023-02-09 | End: 2023-02-21 | Stop reason: SDUPTHER

## 2023-02-09 NOTE — TELEPHONE ENCOUNTER
Caller: Sirisha Valdez    Relationship: Self    Best call back number: [phone]    What was the call regarding: PT CALLED REGARDING THIS - SHE REPORTED THAT SHE IS CONFUSED AND HAS NOT TAKEN THE MEDICATION AT ALL TODAY AND WILL WAIT FOR A RESPONSE BEFORE SHE TAKES MEDICATION    Do you require a callback: YES

## 2023-02-16 ENCOUNTER — TELEPHONE (OUTPATIENT)
Dept: SURGERY | Facility: CLINIC | Age: 78
End: 2023-02-16
Payer: MEDICARE

## 2023-02-21 ENCOUNTER — OFFICE VISIT (OUTPATIENT)
Dept: CARDIOLOGY | Facility: CLINIC | Age: 78
End: 2023-02-21
Payer: MEDICARE

## 2023-02-21 VITALS
RESPIRATION RATE: 16 BRPM | OXYGEN SATURATION: 94 % | WEIGHT: 134.8 LBS | DIASTOLIC BLOOD PRESSURE: 79 MMHG | HEIGHT: 63 IN | HEART RATE: 82 BPM | BODY MASS INDEX: 23.88 KG/M2 | SYSTOLIC BLOOD PRESSURE: 129 MMHG

## 2023-02-21 DIAGNOSIS — I25.10 ARTERIOSCLEROTIC CARDIOVASCULAR DISEASE (ASCVD): Primary | ICD-10-CM

## 2023-02-21 DIAGNOSIS — I49.5 SSS (SICK SINUS SYNDROME): ICD-10-CM

## 2023-02-21 DIAGNOSIS — Z72.0 TOBACCO ABUSE: ICD-10-CM

## 2023-02-21 PROCEDURE — 99213 OFFICE O/P EST LOW 20 MIN: CPT | Performed by: INTERNAL MEDICINE

## 2023-02-21 PROCEDURE — 93000 ELECTROCARDIOGRAM COMPLETE: CPT | Performed by: INTERNAL MEDICINE

## 2023-02-21 RX ORDER — CILOSTAZOL 50 MG/1
50 TABLET ORAL 2 TIMES DAILY
Qty: 60 TABLET | Refills: 5 | Status: SHIPPED | OUTPATIENT
Start: 2023-02-21 | End: 2023-04-06 | Stop reason: SDUPTHER

## 2023-02-21 NOTE — PROGRESS NOTES
Yasmine Reynolds MD  Sirisha Valdez  1945  02/21/2023    Patient Active Problem List   Diagnosis   •  (ASCVD), s/p stenting in 1999, after an MI, repeat stenting in 2005 of the LAD and RCA at [facility] and stenting of the first OM branch of left circumflex on 6/26/2019.Clinically stable.   • SSS (sick sinus syndrome), s/p permanent pacemaker implantion,    • Palpitations   • Dizziness   • Dyslipidemia, currently on rosuvastatin 40 mg daily   • Essential hypertension, blood pressure is running low associated with fatigue.   • PVD (peripheral vascular disease) ([facility])   • Swelling of both lower extremities   • Chronic left shoulder pain   • S/P arthroscopy of shoulder   • Coagulopathy (HCC)   • Bilateral malignant neoplasm of breast in female, estrogen receptor positive (HCC)   • Tobacco abuse   • Aromatase inhibitor use   • Osteopenia   • Abnormal EKG   • Abnormal nuclear stress test   • Collagenous colitis   • Pacemaker battery depletion   • Stage 3b chronic kidney disease ([facility])       Dear Yasmine Reynolds MD:    Subjective     Sirisha Valdez is a 77 y.o. female with the problems as listed above, presents    Chief complaint: Follow-up of coronary artery disease and sick sinus syndrome.    History of Present Illness: Ms. Valdez is a pleasant 77-year-old  female with history of known coronary disease, status post stenting in 1999 and then 2005 involving LAD and RCA and stenting of the first OM branch of circumflex in 2019 and history of sick sinus syndrome, status post permanent dual-chamber pacemaker implantation 2011 with battery replacement in December 2020 with [employer] medical device.  She is here for regular cardiology follow-up.  On today's visit she denies any complaints of chest pains or shortness of breath or palpitations.  She admits to smoking still about a pack a day.    Allergies   Allergen Reactions   • Promethazine      hypotension   :    Current Outpatient  Medications:   •  albuterol (PROAIR RESPICLICK) 108 (90 Base) MCG/ACT inhaler, Inhale Every 4 (Four) Hours As Needed for Wheezing., Disp: , Rfl:   •  anastrozole (ARIMIDEX) 1 MG tablet, Take 1 mg by mouth Daily., Disp: , Rfl:   •  busPIRone (BUSPAR) 30 MG tablet, Take 15 mg by mouth 2 (Two) Times a Day As Needed (anxiety)., Disp: , Rfl:   •  cilostazol (PLETAL) 50 MG tablet, Take 1 tablet by mouth 2 (Two) Times a Day., Disp: 60 tablet, Rfl: 5  •  diazePAM (VALIUM) 10 MG tablet, Take 10 mg by mouth 2 (Two) Times a Day As Needed for Anxiety., Disp: , Rfl:   •  HYDROcodone-acetaminophen (NORCO) 7.5-325 MG per tablet, Take 1 tablet by mouth Every 6 (Six) Hours As Needed for Moderate Pain ., Disp: , Rfl:   •  KLOR-CON 10 MEQ CR tablet, Take 10 mEq by mouth Daily., Disp: , Rfl:   •  levothyroxine (SYNTHROID, LEVOTHROID) 25 MCG tablet, , Disp: , Rfl:   •  metoprolol tartrate (LOPRESSOR) 25 MG tablet, TAKE 1 TABLET TWICE DAILY, Disp: 180 tablet, Rfl: 2  •  nitroglycerin (NITROSTAT) 0.4 MG SL tablet, Place 0.4 mg under the tongue Every 5 (Five) Minutes As Needed for Chest Pain. Take no more than 3 doses in 15 minutes., Disp: , Rfl:   •  rosuvastatin (CRESTOR) 40 MG tablet, Take 1 tablet by mouth Every Night., Disp: 90 tablet, Rfl: 2  •  aspirin 81 MG EC tablet, Take 81 mg by mouth daily., Disp: , Rfl:   •  cephalexin (Keflex) 500 MG capsule, Take 1 capsule by mouth 2 (Two) Times a Day., Disp: 28 capsule, Rfl: 0  •  mupirocin (BACTROBAN) 2 % ointment, Apply  topically to the appropriate area as directed 3 (Three) Times a Day., Disp: , Rfl:   •  NON FORMULARY, Antibiotic cream, Disp: , Rfl:   •  pantoprazole (PROTONIX) 20 MG EC tablet, Take 20 mg by mouth Daily., Disp: , Rfl:   •  potassium chloride 10 MEQ CR tablet, Take 1 tablet by mouth Daily., Disp: 30 tablet, Rfl: 3    The following portions of the patient's history were reviewed and updated as appropriate: allergies, current medications, past family history, past medical  "history, past social history, past surgical history and problem list.    Social History     Tobacco Use   • Smoking status: Every Day     Packs/day: 1.00     Years: 54.00     Pack years: 54.00     Types: Cigarettes     Start date: 1961   • Smokeless tobacco: Never   Vaping Use   • Vaping Use: Never used   Substance Use Topics   • Alcohol use: No   • Drug use: No       Review of Systems   Cardiovascular: Negative for chest pain and palpitations.   Respiratory: Negative for shortness of breath.      Objective   Vitals:    02/21/23 1527   BP: 129/79   Pulse: 82   Resp: 16   SpO2: 94%   Weight: 61.1 kg (134 lb 12.8 oz)   Height: 160 cm (63\")     Body mass index is 23.88 kg/m².    Vitals reviewed.   Constitutional:       Appearance: Well-developed.   Eyes:      Conjunctiva/sclera: Conjunctivae normal.   HENT:      Head: Normocephalic.   Neck:      Thyroid: No thyromegaly.      Vascular: No JVD.      Trachea: No tracheal deviation.   Pulmonary:      Effort: No respiratory distress.      Breath sounds: Normal breath sounds. No wheezing. No rales.   Cardiovascular:      PMI at left midclavicular line. Normal rate. Regular rhythm. Normal S1. Normal S2.      Murmurs: There is no murmur.      No gallop. No click. No rub.   Pulses:     Intact distal pulses.   Edema:     Peripheral edema absent.   Abdominal:      General: Bowel sounds are normal.      Palpations: Abdomen is soft. There is no abdominal mass.      Tenderness: There is no abdominal tenderness.   Musculoskeletal:      Cervical back: Normal range of motion and neck supple. Skin:     General: Skin is warm and dry.   Neurological:      Mental Status: Alert and oriented to person, place, and time.      Cranial Nerves: No cranial nerve deficit.       Lab Results   Component Value Date     05/11/2022    K 3.9 05/11/2022     05/11/2022    CO2 22.0 05/11/2022    BUN 11 05/11/2022    CREATININE 1.00 10/31/2022    GLUCOSE 84 05/11/2022    CALCIUM 9.4 05/11/2022    " AST 20 05/11/2022    ALT 14 05/11/2022    ALKPHOS 68 05/11/2022    LABIL2 2.0 02/08/2021     Lab Results   Component Value Date    CKTOTAL 60 01/02/2016     Lab Results   Component Value Date    WBC 11.54 (H) 12/16/2020    HGB 13.4 12/16/2020    HCT 43.4 12/16/2020     12/16/2020     Lab Results   Component Value Date    INR 0.99 06/19/2018    INR <0.90 01/02/2016     Lab Results   Component Value Date    MG 1.7 05/11/2022     Lab Results   Component Value Date    TSH 2.280 05/11/2022    CHLPL 146 01/02/2016    TRIG 131 05/11/2022    HDL 51 05/11/2022    LDL 59 05/11/2022      Lab Results   Component Value Date    .0 (H) 06/19/2018       ECG 12 Lead    Date/Time: 2/21/2023 3:47 PM  Performed by: Salvador Mccarthy MD  Authorized by: Salvador Mccarthy MD   Comparison: compared with previous ECG from 5/12/2022  Similar to previous ECG  Rhythm: sinus rhythm  BPM: 76  Other findings: non-specific ST-T wave changes  Comments: Appropriate sensing by the pacemaker at baseline and appropriate AV sequential pacing with magnet application.                    Assessment & Plan :   Diagnosis Plan   1.  (ASCVD), s/p stenting in 1999, after an MI, repeat stenting in 2005 of the LAD and RCA at [facility] and stenting of the first OM branch of left circumflex on 6/26/2019.Clinically stable.        2. SSS (sick sinus syndrome), status post permanent dual-chamber pacemaker implantation in 2011 and battery replacement in 1220 with a [employer] medical device, seems to be functioning well.        3. Tobacco abuse             Recommendations:  1. Continue with aspirin, rosuvastatin and metoprolol at current doses.  2. I have reemphasized to him about smoking cessation.  He said she has tried Chantix, nicotine patches which apparently did not work for her.    Return in about 6 months (around 8/21/2023).    As always, Yasmine Reynolds MD  I appreciate very much the opportunity to participate in the cardiovascular  care of your patients. Please do not hesitate to call me with any questions with regards to Beaulah M Valdez evaluation and management.     With Best Regards,        Salvador Mccarthy MD, FACC    Please note that portions of this note were completed with a voice recognition program.

## 2023-02-24 ENCOUNTER — TELEPHONE (OUTPATIENT)
Dept: SURGERY | Facility: CLINIC | Age: 78
End: 2023-02-24
Payer: MEDICARE

## 2023-02-24 NOTE — TELEPHONE ENCOUNTER
Patient's daughter, Elisabeth, called and stated that she has been trying to get a hold of Dr. Babin due to the patient being out of her hormone medicine. I called and spoke with Alexus at the [place] office and she said that they will call her. Alexus called back and said she couldn't find her. I told her the name might be spelled differently then what she had. I gave her the correct spelling and the  and she said that she will call.

## 2023-03-09 ENCOUNTER — CLINICAL SUPPORT NO REQUIREMENTS (OUTPATIENT)
Dept: CARDIOLOGY | Facility: CLINIC | Age: 78
End: 2023-03-09
Payer: MEDICARE

## 2023-03-09 DIAGNOSIS — I49.5 SSS (SICK SINUS SYNDROME): Primary | ICD-10-CM

## 2023-03-09 PROCEDURE — 93280 PM DEVICE PROGR EVAL DUAL: CPT | Performed by: INTERNAL MEDICINE

## 2023-03-14 ENCOUNTER — HOSPITAL ENCOUNTER (OUTPATIENT)
Dept: MAMMOGRAPHY | Facility: HOSPITAL | Age: 78
Discharge: HOME | End: 2023-03-14
Payer: MEDICARE

## 2023-03-14 ENCOUNTER — HOSPITAL ENCOUNTER (OUTPATIENT)
Dept: BONE DENSITY | Facility: HOSPITAL | Age: 78
Discharge: HOME | End: 2023-03-14
Payer: MEDICARE

## 2023-03-14 DIAGNOSIS — Z17.0 MALIGNANT NEOPLASM OF CENTRAL PORTION OF BOTH BREASTS IN FEMALE, ESTROGEN RECEPTOR POSITIVE: ICD-10-CM

## 2023-03-14 DIAGNOSIS — C50.111 MALIGNANT NEOPLASM OF CENTRAL PORTION OF BOTH BREASTS IN FEMALE, ESTROGEN RECEPTOR POSITIVE: ICD-10-CM

## 2023-03-14 DIAGNOSIS — Z78.0 POST-MENOPAUSE: ICD-10-CM

## 2023-03-14 DIAGNOSIS — C50.112 MALIGNANT NEOPLASM OF CENTRAL PORTION OF BOTH BREASTS IN FEMALE, ESTROGEN RECEPTOR POSITIVE: ICD-10-CM

## 2023-03-14 PROCEDURE — 77063 BREAST TOMOSYNTHESIS BI: CPT | Performed by: RADIOLOGY

## 2023-03-14 PROCEDURE — 77067 SCR MAMMO BI INCL CAD: CPT | Performed by: RADIOLOGY

## 2023-03-14 PROCEDURE — 77080 DXA BONE DENSITY AXIAL: CPT

## 2023-03-14 PROCEDURE — 77080 DXA BONE DENSITY AXIAL: CPT | Performed by: RADIOLOGY

## 2023-03-14 PROCEDURE — 77063 BREAST TOMOSYNTHESIS BI: CPT

## 2023-03-14 PROCEDURE — 77067 SCR MAMMO BI INCL CAD: CPT

## 2023-03-20 ENCOUNTER — OFFICE VISIT (OUTPATIENT)
Dept: SURGERY | Facility: CLINIC | Age: 78
End: 2023-03-20
Payer: MEDICARE

## 2023-03-20 VITALS
BODY MASS INDEX: 23.71 KG/M2 | SYSTOLIC BLOOD PRESSURE: 147 MMHG | DIASTOLIC BLOOD PRESSURE: 64 MMHG | WEIGHT: 133.8 LBS | HEIGHT: 63 IN | HEART RATE: 83 BPM

## 2023-03-20 DIAGNOSIS — Z85.3 HISTORY OF BREAST CANCER: Primary | ICD-10-CM

## 2023-03-20 DIAGNOSIS — M85.852 OSTEOPENIA OF NECK OF LEFT FEMUR: ICD-10-CM

## 2023-03-20 DIAGNOSIS — K52.831 COLLAGENOUS COLITIS: ICD-10-CM

## 2023-03-20 PROCEDURE — 99214 OFFICE O/P EST MOD 30 MIN: CPT | Performed by: SURGERY

## 2023-03-20 PROCEDURE — 3077F SYST BP >= 140 MM HG: CPT | Performed by: SURGERY

## 2023-03-20 PROCEDURE — 93702 BIS XTRACELL FLUID ANALYSIS: CPT | Performed by: SURGERY

## 2023-03-20 PROCEDURE — 1159F MED LIST DOCD IN RCRD: CPT | Performed by: SURGERY

## 2023-03-20 PROCEDURE — 1160F RVW MEDS BY RX/DR IN RCRD: CPT | Performed by: SURGERY

## 2023-03-20 PROCEDURE — 3078F DIAST BP <80 MM HG: CPT | Performed by: SURGERY

## 2023-03-20 RX ORDER — BUDESONIDE 3 MG/1
9 CAPSULE, COATED PELLETS ORAL EVERY MORNING
Qty: 90 CAPSULE | Refills: 11 | Status: SHIPPED | OUTPATIENT
Start: 2023-03-20 | End: 2023-04-19

## 2023-03-20 NOTE — PROGRESS NOTES
Subjective   Sirisha Valdez is a 77 y.o. female here today for mammogram and DEXA review.    History of Present Illness  Ms. Valdez was seen in the office today for breast cancer follow-up.  She is status post a left breast lumpectomy and sentinel lymph node biopsy, and a right breast lumpectomy on 12/12/17.  Final pathology demonstrated left T1bN0 infiltrating ductal carcinoma, ER positive, ND positive, HER-2 negative.  The anterior margin was positive for invasive tumor and other margins were positive for DCIS.  The right lumpectomy demonstrated DCIS, ER positive, ND positive with close but clear margins.  The patient underwent margin reexcision of the left and the right breast on 12/27/17.  The right lumpectomy showed no residual disease and the left side demonstrated a very small focus of DCIS.   Due to the patient's age and clear margin status the decision was made to forego radiation.  The patient was started on anastrozole in January, 2018. She states she is tolerating it well with no side effects.  The patient has no complaints referable to the breast other than occasional left lateral breast pain.  She denies any arm swelling or axillary adenopathy.  She denies any new bone or joint pain or other symptoms of metastatic disease.  Sirisha had a bilateral mammogram with tomosynthesis on 3/14/2023 which demonstrated presumed postoperative changes for which a12-month follow-up was recommended.  Bone density was performed on 3/23 which demonstrated the diagnosis of osteopenia with a maximum T score of -1.2  Allergies   Allergen Reactions   • Promethazine      hypotension       Current Outpatient Medications   Medication Sig Dispense Refill   • albuterol (PROAIR RESPICLICK) 108 (90 Base) MCG/ACT inhaler Inhale Every 4 (Four) Hours As Needed for Wheezing.     • anastrozole (ARIMIDEX) 1 MG tablet Take 1 tablet by mouth Daily.     • aspirin 81 MG EC tablet Take 1 tablet by mouth Daily.     • busPIRone (BUSPAR) 30 MG  tablet Take 15 mg by mouth 2 (Two) Times a Day As Needed (anxiety).     • cilostazol (PLETAL) 50 MG tablet Take 1 tablet by mouth 2 (Two) Times a Day. 60 tablet 5   • diazePAM (VALIUM) 10 MG tablet Take 1 tablet by mouth 2 (Two) Times a Day As Needed for Anxiety.     • HYDROcodone-acetaminophen (NORCO) 7.5-325 MG per tablet Take 1 tablet by mouth Every 6 (Six) Hours As Needed for Moderate Pain.     • KLOR-CON 10 MEQ CR tablet Take 1 tablet by mouth Daily.     • levothyroxine (SYNTHROID, LEVOTHROID) 25 MCG tablet      • metoprolol tartrate (LOPRESSOR) 25 MG tablet TAKE 1 TABLET TWICE DAILY 180 tablet 2   • nitroglycerin (NITROSTAT) 0.4 MG SL tablet Place 1 tablet under the tongue Every 5 (Five) Minutes As Needed for Chest Pain. Take no more than 3 doses in 15 minutes.     • NON FORMULARY Antibiotic cream     • pantoprazole (PROTONIX) 20 MG EC tablet Take 1 tablet by mouth Daily.     • potassium chloride 10 MEQ CR tablet Take 1 tablet by mouth Daily. 30 tablet 3   • rosuvastatin (CRESTOR) 40 MG tablet Take 1 tablet by mouth Every Night. 90 tablet 2   • mupirocin (BACTROBAN) 2 % ointment Apply  topically to the appropriate area as directed 3 (Three) Times a Day. (Patient not taking: Reported on 3/20/2023)       No current facility-administered medications for this visit.     Past Medical History:   Diagnosis Date   • Anxiety    • Breast cancer (HCC) 11/28/2017    bilateral   • CHF (congestive heart failure) (Formerly [facility])    • CKD (chronic kidney disease)    • Coronary artery disease     3 HEART STENTS   • Deep vein thrombosis (HCC)     right lower extremity   • Depression    • Disease of thyroid gland    • Gastroesophageal reflux    • Glaucoma    • Hyperlipidemia    • Hypertension    • MI (myocardial infarction) (Formerly [facility])    • Migraine    • Peptic ulcer disease    • Peripheral arterial disease (Formerly [facility])     KIM LEG STENTS       ANDFEM POP   • Peripheral vascular disease (HCC)    • SSS (sick sinus syndrome) (Formerly [facility])    • Status post  placement of cardiac pacemaker    • Wears dentures    • Wears glasses      Past Surgical History:   Procedure Laterality Date   • BREAST LUMPECTOMY Right 12/12/2017    Procedure: BREAST LUMPECTOMY WITH NEEDLE LOCALIZATION;  Surgeon: Lida Babin MD;  Location: [facility];  Service:    • BREAST LUMPECTOMY Bilateral 12/27/2017    Procedure: right breast re-excision  left breast re-excision with 3 cm inferior and superior advancement flap;  Surgeon: Lida Babin MD;  Location: [facility] OR;  Service:    • BREAST LUMPECTOMY WITH SENTINEL NODE BIOPSY AND AXILLARY NODE DISSECTION Left 12/12/2017    Procedure: Left BREAST LUMPECTOMY WITH SENTINEL NODE BIOPSY  Isotope and Lymphazurin injection left breast. Left breast preoperative needle localization;  Surgeon: Lida Babin MD;  Location: [facility];  Service:    • BREAST SURGERY      Mass Removal.   • CARDIAC CATHETERIZATION     • CARDIAC CATHETERIZATION N/A 06/26/2019    Procedure: Left Heart Cath;  Surgeon: Salvador Mccarthy MD;  Location: [facility] CATH INVASIVE LOCATION;  Service: Cardiology   • CARDIAC ELECTROPHYSIOLOGY PROCEDURE Left 12/16/2020    Procedure: PPM generator change - dual;  Surgeon: Salvador Mccarthy MD;  Location: [facility] CATH INVASIVE LOCATION;  Service: Cardiology;  Laterality: Left;   • CARPAL TUNNEL RELEASE     • CATARACT EXTRACTION Bilateral    • ESOPHAGUS SURGERY     • FEMORAL DISTAL BYPASS     • FEMORAL FEMORAL BYPASS     • HIP SURGERY Right    • HYSTERECTOMY     • PACEMAKER IMPLANTATION     • DC RT/LT HEART CATHETERS N/A 06/26/2019    Procedure: Percutaneous Coronary Intervention;  Surgeon: Gus Suero MD;  Location: [facility] CATH INVASIVE LOCATION;  Service: Cardiology   • SHOULDER ARTHROSCOPY W/ ROTATOR CUFF REPAIR Left 03/08/2017    Procedure: LEFT SHOULDER ARTHROSCOPY, ACROMIOPLASTY, SUBACROMIC DEBRIDMENT;  Surgeon: Tesfaye Polo MD;  Location: [facility] OR;  Service:    • SHOULDER SURGERY         Pertinent Review of  "Systems  Patient states that her colitis symptoms are under control with the budesonide and requests a refill  Recent fall with some bruising on the face and arms  Objective   Ht 160 cm (63\")   Wt 60.7 kg (133 lb 12.8 oz)   BMI 23.70 kg/m²    Physical Exam  General:  This is a WD WN female in no acute distress  Vital signs stable, afebrile  HEENT exam:  WNL. Sclera are anicteric.  EOMI  Neck:  supple, FROM.  No JVD.  Trachea midline.  No palpable thyroid nodules. No supraclavicular adenopathy  Lungs:  Respiratory effort normal. Auscultation: Clear, without wheezes, rhonchi, rales  Heart:  Regular rate and rhythm, without murmur, gallop, rub.  No pedal edema  Breasts: On visual inspection there is some mild volume loss on the left..  Examination of the right breast demonstrates a well-healed circumareolar scar, without discrete mass, skin change, axillary adenopathy.  Examination of the left breast demonstrates a well-healed circumareolar scar, without discrete mass, skin change, axillary adenopathy.  Upper extremity: Without edema  Procedures   L dex was performed and was 3.1, with the prior on 2/21/2022 being 4.8  Results/Data:  Imaging: Mammogram reports and images as well as bone density report from 3/14/2023 were reviewed.  I agree with the assessment  Notes:   Lab:   Other:     Assessment & Plan   Low grade DCIS of the right breast.  Grade 1 infiltrating ductal carcinoma of the left breast T1b N0 MX, ER positive, KS positive  Both sites with clear margins on reexcision  New diagnosis of osteopenia  Status post completion of 5 years of AI    Plan: Breast cancer index will be ordered and the result will be tracked.  If no benefit to extended therapy patient will discontinue her anastrozole  Bilateral mammogram in 1 years time with return to the office following  Start calcium and vitamin D for osteopenia  At patient's request budesonide will be renewed for 1 year       Discussion/Summary:    Time spent:     BMI " is within normal parameters. No other follow-up for BMI required.         Future Appointments   Date Time Provider Department Center   8/22/2023 12:45 PM Salvador Mccarthy MD [facility]         Please note that portions of this note were completed with a voice recognition program.

## 2023-03-28 ENCOUNTER — TELEPHONE (OUTPATIENT)
Dept: SURGERY | Facility: CLINIC | Age: 78
End: 2023-03-28
Payer: MEDICARE

## 2023-03-28 NOTE — TELEPHONE ENCOUNTER
Miss Grimm called and stated Dr. Rodolfo draper prescribed Anastrozole 1mg and wanted to know if she should take it? I told her that is the cancer medication and that she has taken it for 5 years. I reminded her that a Breast Cancer Index has been sent but results are not back yet. That will help make the decision about how long she is to stay on the Anastrozole. I cautioned her NOT to double dose herself and she said she wouldn't.

## 2023-04-06 ENCOUNTER — OFFICE VISIT (OUTPATIENT)
Dept: CARDIOLOGY | Facility: CLINIC | Age: 78
End: 2023-04-06
Payer: MEDICARE

## 2023-04-06 VITALS
HEART RATE: 87 BPM | BODY MASS INDEX: 23.64 KG/M2 | WEIGHT: 133.4 LBS | OXYGEN SATURATION: 95 % | HEIGHT: 63 IN | DIASTOLIC BLOOD PRESSURE: 69 MMHG | SYSTOLIC BLOOD PRESSURE: 126 MMHG

## 2023-04-06 DIAGNOSIS — I47.1 PAROXYSMAL SVT (SUPRAVENTRICULAR TACHYCARDIA): Primary | ICD-10-CM

## 2023-04-06 DIAGNOSIS — I49.5 SSS (SICK SINUS SYNDROME): ICD-10-CM

## 2023-04-06 DIAGNOSIS — I25.10 ARTERIOSCLEROTIC CARDIOVASCULAR DISEASE (ASCVD): ICD-10-CM

## 2023-04-06 PROCEDURE — 3078F DIAST BP <80 MM HG: CPT | Performed by: INTERNAL MEDICINE

## 2023-04-06 PROCEDURE — 3074F SYST BP LT 130 MM HG: CPT | Performed by: INTERNAL MEDICINE

## 2023-04-06 PROCEDURE — 99214 OFFICE O/P EST MOD 30 MIN: CPT | Performed by: INTERNAL MEDICINE

## 2023-04-06 RX ORDER — CILOSTAZOL 50 MG/1
50 TABLET ORAL 2 TIMES DAILY
Qty: 60 TABLET | Refills: 5 | Status: SHIPPED | OUTPATIENT
Start: 2023-04-06

## 2023-04-06 RX ORDER — METOPROLOL TARTRATE 50 MG/1
50 TABLET, FILM COATED ORAL 2 TIMES DAILY
Qty: 60 TABLET | Refills: 3 | Status: SHIPPED | OUTPATIENT
Start: 2023-04-06

## 2023-04-06 NOTE — PROGRESS NOTES
Yasmine Reynolds MD  Sirisha Valdez  1945  04/06/2023    Patient Active Problem List   Diagnosis   •  (ASCVD), s/p stenting in 1999, after an MI, repeat stenting in 2005 of the LAD and RCA at [facility] and stenting of the first OM branch of left circumflex on 6/26/2019.Clinically stable.   • SSS (sick sinus syndrome), s/p permanent pacemaker implantion,    • Palpitations   • Dizziness   • Dyslipidemia, currently on rosuvastatin 40 mg daily   • Essential hypertension, blood pressure is running low associated with fatigue.   • PVD (peripheral vascular disease)   • Swelling of both lower extremities   • Chronic left shoulder pain   • S/P arthroscopy of shoulder   • Coagulopathy   • Bilateral malignant neoplasm of breast in female, estrogen receptor positive   • Tobacco abuse   • Aromatase inhibitor use   • Osteopenia   • Abnormal EKG   • Abnormal nuclear stress test   • Collagenous colitis   • Pacemaker battery depletion   • Stage 3b chronic kidney disease       Dear Yasmine Reynolds MD:    Subjective     Sirisha Valdez is a 77 y.o. female with the problems as listed above, presents    Chief complaint: Follow-up of finding of episodes of SVT on recent pacemaker interrogation.    History of Present Illness: Ms. Medrano is a pleasant 77-year-old  female with history of sick sinus syndrome, status post permanent dual-chamber pacemaker implantation in 2011 with battery replacement in December 2020, was noted to have episodes of SVT with rates up to 190 bpm x 2 on recent pacemaker interrogation. She is brought in today to discuss this and for further management.  On further questioning she denies any complaints of palpitations, dizziness or syncope.  She fact states that she does not feel her heart rate.  She also has history of CAD, status post stenting in 1999 and 2005 involving LAD and RCA.  She denies any recent chest pains or shortness of breath.    Allergies   Allergen Reactions   •  Promethazine      hypotension   :    Current Outpatient Medications:   •  albuterol (PROAIR RESPICLICK) 108 (90 Base) MCG/ACT inhaler, Inhale Every 4 (Four) Hours As Needed for Wheezing., Disp: , Rfl:   •  anastrozole (ARIMIDEX) 1 MG tablet, Take 1 tablet by mouth Daily., Disp: , Rfl:   •  aspirin 81 MG EC tablet, Take 1 tablet by mouth Daily., Disp: , Rfl:   •  Budesonide (Entocort EC) 3 MG 24 hr capsule, Take 3 capsules by mouth Every Morning for 30 days., Disp: 90 capsule, Rfl: 11  •  busPIRone (BUSPAR) 30 MG tablet, Take 15 mg by mouth 2 (Two) Times a Day As Needed (anxiety)., Disp: , Rfl:   •  cilostazol (PLETAL) 50 MG tablet, Take 1 tablet by mouth 2 (Two) Times a Day., Disp: 60 tablet, Rfl: 5  •  diazePAM (VALIUM) 10 MG tablet, Take 1 tablet by mouth 2 (Two) Times a Day As Needed for Anxiety., Disp: , Rfl:   •  HYDROcodone-acetaminophen (NORCO) 7.5-325 MG per tablet, Take 1 tablet by mouth Every 6 (Six) Hours As Needed for Moderate Pain., Disp: , Rfl:   •  KLOR-CON 10 MEQ CR tablet, Take 1 tablet by mouth Daily., Disp: , Rfl:   •  levothyroxine (SYNTHROID, LEVOTHROID) 25 MCG tablet, , Disp: , Rfl:   •  metoprolol tartrate (LOPRESSOR) 50 MG tablet, Take 1 tablet by mouth 2 (Two) Times a Day., Disp: 60 tablet, Rfl: 3  •  mupirocin (BACTROBAN) 2 % ointment, Apply  topically to the appropriate area as directed 3 (Three) Times a Day., Disp: , Rfl:   •  nitroglycerin (NITROSTAT) 0.4 MG SL tablet, Place 1 tablet under the tongue Every 5 (Five) Minutes As Needed for Chest Pain. Take no more than 3 doses in 15 minutes., Disp: , Rfl:   •  NON FORMULARY, Antibiotic cream, Disp: , Rfl:   •  pantoprazole (PROTONIX) 20 MG EC tablet, Take 1 tablet by mouth Daily., Disp: , Rfl:   •  potassium chloride 10 MEQ CR tablet, Take 1 tablet by mouth Daily., Disp: 30 tablet, Rfl: 3  •  rosuvastatin (CRESTOR) 40 MG tablet, Take 1 tablet by mouth Every Night., Disp: 90 tablet, Rfl: 2    The following portions of the patient's history  "were reviewed and updated as appropriate: allergies, current medications, past family history, past medical history, past social history, past surgical history and problem list.    Social History     Tobacco Use   • Smoking status: Every Day     Packs/day: 1.00     Years: 54.00     Pack years: 54.00     Types: Cigarettes     Start date: 1961   • Smokeless tobacco: Never   Vaping Use   • Vaping Use: Never used   Substance Use Topics   • Alcohol use: No   • Drug use: No       Review of Systems   Constitutional: Negative for chills and fever.   HENT: Negative for nosebleeds and sore throat.    Respiratory: Negative for cough, hemoptysis and wheezing.    Gastrointestinal: Negative for abdominal pain, hematemesis, hematochezia, melena, nausea and vomiting.   Genitourinary: Negative for dysuria and hematuria.   Neurological: Negative for headaches.     Objective   Vitals:    04/06/23 1516   BP: 126/69   Pulse: 87   SpO2: 95%   Weight: 60.5 kg (133 lb 6.4 oz)   Height: 160 cm (63\")     Body mass index is 23.63 kg/m².    Vitals reviewed.   Constitutional:       Appearance: Well-developed.   Eyes:      Conjunctiva/sclera: Conjunctivae normal.   HENT:      Head: Normocephalic.   Neck:      Thyroid: No thyromegaly.      Vascular: No JVD.      Trachea: No tracheal deviation.   Pulmonary:      Effort: No respiratory distress.      Breath sounds: Normal breath sounds. No wheezing. No rales.   Cardiovascular:      PMI at left midclavicular line. Normal rate. Regular rhythm. Normal S1. Normal S2.      Murmurs: There is no murmur.      No gallop. No click. No rub.   Pulses:     Intact distal pulses.   Edema:     Peripheral edema absent.   Abdominal:      General: Bowel sounds are normal.      Palpations: Abdomen is soft. There is no abdominal mass.      Tenderness: There is no abdominal tenderness.   Musculoskeletal:      Cervical back: Normal range of motion and neck supple. Skin:     General: Skin is warm and dry.   Neurological: "      Mental Status: Alert and oriented to person, place, and time.      Cranial Nerves: No cranial nerve deficit.       Lab Results   Component Value Date     05/11/2022    K 3.9 05/11/2022     05/11/2022    CO2 22.0 05/11/2022    BUN 11 05/11/2022    CREATININE 1.00 10/31/2022    GLUCOSE 84 05/11/2022    CALCIUM 9.4 05/11/2022    AST 20 05/11/2022    ALT 14 05/11/2022    ALKPHOS 68 05/11/2022    LABIL2 2.0 02/08/2021       Lab Results   Component Value Date    MG 1.7 05/11/2022     Lab Results   Component Value Date    TSH 2.280 05/11/2022    CHLPL 146 01/02/2016    TRIG 131 05/11/2022    HDL 51 05/11/2022    LDL 59 05/11/2022        Assessment & Plan :   Diagnosis Plan   1. Paroxysmal SVT (supraventricular tachycardia) noted on recent pacemaker interrogation.       2.  (ASCVD), s/p stenting in 1999, after an MI, repeat stenting in 2005 of the LAD and RCA at [facility] and stenting of the first OM branch of left circumflex on 6/26/2019.Clinically stable.        3. SSS (sick sinus syndrome), s/p permanent pacemaker implantation in 2011 with battery replacement in December 2020 with [employer] device.            Recommendations:  We will increase the dose of metoprolol to 50 mg p.o. twice daily.  I have discussed this with the patient and she expressed understanding.    Return in about 3 months (around 7/6/2023) for or sooner if needed.    As always, Yasmine Reynolds MD  I appreciate very much the opportunity to participate in the cardiovascular care of your patients. Please do not hesitate to call me with any questions with regards to Sirisha Valdez's evaluation and management.       With Best Regards,        Salvador Mccarthy MD, [facility]    Please note that portions of this note were completed with a voice recognition program.

## 2023-04-06 NOTE — LETTER
April 9, 2023     Yasmine Reynolds MD  [address] [place] [address]    Patient: Sirisha Valdez   YOB: 1945   Date of Visit: 4/6/2023       Dear Dr. Rodolfo MD:    Thank you for referring Sirisha Valdez to me for evaluation. Below are the relevant portions of my assessment and plan of care.    If you have questions, please do not hesitate to call me. I look forward to following Sirisha along with you.         Sincerely,        Salvador Mccarthy MD        CC: No Recipients    Salvador Mccarthy MD  04/09/23 1502  Sign when Signing Visit  Yasmine Reynolds MD  Sirisah Valdez  1945  04/06/2023    Patient Active Problem List   Diagnosis   •  (ASCVD), s/p stenting in 1999, after an MI, repeat stenting in 2005 of the LAD and RCA at [facility] and stenting of the first OM branch of left circumflex on 6/26/2019.Clinically stable.   • SSS (sick sinus syndrome), s/p permanent pacemaker implantion,    • Palpitations   • Dizziness   • Dyslipidemia, currently on rosuvastatin 40 mg daily   • Essential hypertension, blood pressure is running low associated with fatigue.   • PVD (peripheral vascular disease)   • Swelling of both lower extremities   • Chronic left shoulder pain   • S/P arthroscopy of shoulder   • Coagulopathy   • Bilateral malignant neoplasm of breast in female, estrogen receptor positive   • Tobacco abuse   • Aromatase inhibitor use   • Osteopenia   • Abnormal EKG   • Abnormal nuclear stress test   • Collagenous colitis   • Pacemaker battery depletion   • Stage 3b chronic kidney disease       Dear Yasmine Reynolds MD:    Subjective      Sirisha Valdez is a 77 y.o. female with the problems as listed above, presents    Chief complaint: Follow-up of finding of episodes of SVT on recent pacemaker interrogation.    History of Present Illness: Ms. Medrano is a pleasant 77-year-old  female with history of sick sinus syndrome, status post permanent dual-chamber  pacemaker implantation in 2011 with battery replacement in December 2020, was noted to have episodes of SVT with rates up to 190 bpm x 2 on recent pacemaker interrogation. She is brought in today to discuss this and for further management.  On further questioning she denies any complaints of palpitations, dizziness or syncope.  She in fact states that she does not feel her heart rate.  She also has history of CAD, status post stenting in 1999 and 2005 involving LAD and RCA.  She denies any recent chest pains or shortness of breath.    Allergies   Allergen Reactions   • Promethazine      hypotension   :    Current Outpatient Medications:   •  albuterol (PROAIR RESPICLICK) 108 (90 Base) MCG/ACT inhaler, Inhale Every 4 (Four) Hours As Needed for Wheezing., Disp: , Rfl:   •  anastrozole (ARIMIDEX) 1 MG tablet, Take 1 tablet by mouth Daily., Disp: , Rfl:   •  aspirin 81 MG EC tablet, Take 1 tablet by mouth Daily., Disp: , Rfl:   •  Budesonide (Entocort EC) 3 MG 24 hr capsule, Take 3 capsules by mouth Every Morning for 30 days., Disp: 90 capsule, Rfl: 11  •  busPIRone (BUSPAR) 30 MG tablet, Take 15 mg by mouth 2 (Two) Times a Day As Needed (anxiety)., Disp: , Rfl:   •  cilostazol (PLETAL) 50 MG tablet, Take 1 tablet by mouth 2 (Two) Times a Day., Disp: 60 tablet, Rfl: 5  •  diazePAM (VALIUM) 10 MG tablet, Take 1 tablet by mouth 2 (Two) Times a Day As Needed for Anxiety., Disp: , Rfl:   •  HYDROcodone-acetaminophen (NORCO) 7.5-325 MG per tablet, Take 1 tablet by mouth Every 6 (Six) Hours As Needed for Moderate Pain., Disp: , Rfl:   •  KLOR-CON 10 MEQ CR tablet, Take 1 tablet by mouth Daily., Disp: , Rfl:   •  levothyroxine (SYNTHROID, LEVOTHROID) 25 MCG tablet, , Disp: , Rfl:   •  metoprolol tartrate (LOPRESSOR) 50 MG tablet, Take 1 tablet by mouth 2 (Two) Times a Day., Disp: 60 tablet, Rfl: 3  •  mupirocin (BACTROBAN) 2 % ointment, Apply  topically to the appropriate area as directed 3 (Three) Times a Day., Disp: , Rfl:  "  •  nitroglycerin (NITROSTAT) 0.4 MG SL tablet, Place 1 tablet under the tongue Every 5 (Five) Minutes As Needed for Chest Pain. Take no more than 3 doses in 15 minutes., Disp: , Rfl:   •  NON FORMULARY, Antibiotic cream, Disp: , Rfl:   •  pantoprazole (PROTONIX) 20 MG EC tablet, Take 1 tablet by mouth Daily., Disp: , Rfl:   •  potassium chloride 10 MEQ CR tablet, Take 1 tablet by mouth Daily., Disp: 30 tablet, Rfl: 3  •  rosuvastatin (CRESTOR) 40 MG tablet, Take 1 tablet by mouth Every Night., Disp: 90 tablet, Rfl: 2    The following portions of the patient's history were reviewed and updated as appropriate: allergies, current medications, past family history, past medical history, past social history, past surgical history and problem list.    Social History     Tobacco Use   • Smoking status: Every Day     Packs/day: 1.00     Years: 54.00     Pack years: 54.00     Types: Cigarettes     Start date: 1961   • Smokeless tobacco: Never   Vaping Use   • Vaping Use: Never used   Substance Use Topics   • Alcohol use: No   • Drug use: No       Review of Systems   Constitutional: Negative for chills and fever.   HENT: Negative for nosebleeds and sore throat.    Respiratory: Negative for cough, hemoptysis and wheezing.    Gastrointestinal: Negative for abdominal pain, hematemesis, hematochezia, melena, nausea and vomiting.   Genitourinary: Negative for dysuria and hematuria.   Neurological: Negative for headaches.     Objective    Vitals:    04/06/23 1516   BP: 126/69   Pulse: 87   SpO2: 95%   Weight: 60.5 kg (133 lb 6.4 oz)   Height: 160 cm (63\")     Body mass index is 23.63 kg/m².    Vitals reviewed.   Constitutional:       Appearance: Well-developed.   Eyes:      Conjunctiva/sclera: Conjunctivae normal.   HENT:      Head: Normocephalic.   Neck:      Thyroid: No thyromegaly.      Vascular: No JVD.      Trachea: No tracheal deviation.   Pulmonary:      Effort: No respiratory distress.      Breath sounds: Normal breath " sounds. No wheezing. No rales.   Cardiovascular:      PMI at left midclavicular line. Normal rate. Regular rhythm. Normal S1. Normal S2.      Murmurs: There is no murmur.      No gallop. No click. No rub.   Pulses:     Intact distal pulses.   Edema:     Peripheral edema absent.   Abdominal:      General: Bowel sounds are normal.      Palpations: Abdomen is soft. There is no abdominal mass.      Tenderness: There is no abdominal tenderness.   Musculoskeletal:      Cervical back: Normal range of motion and neck supple. Skin:     General: Skin is warm and dry.   Neurological:      Mental Status: Alert and oriented to person, place, and time.      Cranial Nerves: No cranial nerve deficit.       Lab Results   Component Value Date     05/11/2022    K 3.9 05/11/2022     05/11/2022    CO2 22.0 05/11/2022    BUN 11 05/11/2022    CREATININE 1.00 10/31/2022    GLUCOSE 84 05/11/2022    CALCIUM 9.4 05/11/2022    AST 20 05/11/2022    ALT 14 05/11/2022    ALKPHOS 68 05/11/2022    LABIL2 2.0 02/08/2021       Lab Results   Component Value Date    MG 1.7 05/11/2022     Lab Results   Component Value Date    TSH 2.280 05/11/2022    CHLPL 146 01/02/2016    TRIG 131 05/11/2022    HDL 51 05/11/2022    LDL 59 05/11/2022        Assessment & Plan :   Diagnosis Plan   1. Paroxysmal SVT (supraventricular tachycardia) noted on recent pacemaker interrogation.       2.  (ASCVD), s/p stenting in 1999, after an MI, repeat stenting in 2005 of the LAD and RCA at [facility] and stenting of the first OM branch of left circumflex on 6/26/2019.Clinically stable.        3. SSS (sick sinus syndrome), s/p permanent pacemaker implantation in 2011 with battery replacement in December 2020 with Abbotts device.            Recommendations:  We will increase the dose of metoprolol to 50 mg p.o. twice daily.  I have discussed this with the patient and she expressed understanding.    Return in about 3 months (around 7/6/2023) for or sooner if  needed.    As always, Yasmine Reynolds MD  I appreciate very much the opportunity to participate in the cardiovascular care of your patients. Please do not hesitate to call me with any questions with regards to Sirisha Valdez's evaluation and management.       With Best Regards,        Salvador Mccarthy MD, [facility]    Please note that portions of this note were completed with a voice recognition program.

## 2023-04-24 RX ORDER — METOPROLOL TARTRATE 50 MG/1
TABLET, FILM COATED ORAL
Qty: 180 TABLET | Refills: 1 | Status: SHIPPED | OUTPATIENT
Start: 2023-04-24

## 2023-05-17 ENCOUNTER — TELEPHONE (OUTPATIENT)
Dept: SURGERY | Facility: CLINIC | Age: 78
End: 2023-05-17
Payer: MEDICARE

## 2023-07-24 ENCOUNTER — TELEPHONE (OUTPATIENT)
Dept: CARDIOLOGY | Facility: CLINIC | Age: 78
End: 2023-07-24
Payer: MEDICARE

## 2023-07-24 NOTE — TELEPHONE ENCOUNTER
Patients daughter called and said that patient was told over a year and a half ago that she had a blockage. She said that she went and seen the surgeon in [place] and they didn't want to do surgery. She said since then nothing else has been said about it. She said the patient has been having some back pain and wants to know if it might have something to do with that. Can someone call them back about this.     Thanks!

## 2023-07-24 NOTE — TELEPHONE ENCOUNTER
She has an appointment on 8/10 where we can address this. If she is concerned about the back pain she can take nitroglycrin and/or go to the ER.

## 2023-07-25 NOTE — TELEPHONE ENCOUNTER
Hub can read.       Jose G Contreras PA-C     She has an appointment on 8/10 where we can address this. If she is concerned about the back pain she can take nitroglycrin and/or go to the ER.           Called pt no answer LM.

## 2023-08-10 ENCOUNTER — OFFICE VISIT (OUTPATIENT)
Dept: CARDIOLOGY | Facility: CLINIC | Age: 78
End: 2023-08-10
Payer: MEDICARE

## 2023-08-10 VITALS
BODY MASS INDEX: 23.21 KG/M2 | OXYGEN SATURATION: 93 % | HEART RATE: 68 BPM | RESPIRATION RATE: 18 BRPM | DIASTOLIC BLOOD PRESSURE: 62 MMHG | HEIGHT: 63 IN | WEIGHT: 131 LBS | SYSTOLIC BLOOD PRESSURE: 120 MMHG

## 2023-08-10 DIAGNOSIS — I47.1 PAROXYSMAL SVT (SUPRAVENTRICULAR TACHYCARDIA): ICD-10-CM

## 2023-08-10 DIAGNOSIS — I49.5 SSS (SICK SINUS SYNDROME): ICD-10-CM

## 2023-08-10 DIAGNOSIS — I25.10 ARTERIOSCLEROTIC CARDIOVASCULAR DISEASE (ASCVD): Primary | ICD-10-CM

## 2023-08-10 PROCEDURE — 3078F DIAST BP <80 MM HG: CPT | Performed by: INTERNAL MEDICINE

## 2023-08-10 PROCEDURE — 99213 OFFICE O/P EST LOW 20 MIN: CPT | Performed by: INTERNAL MEDICINE

## 2023-08-10 PROCEDURE — 3074F SYST BP LT 130 MM HG: CPT | Performed by: INTERNAL MEDICINE

## 2023-08-10 NOTE — PROGRESS NOTES
Yasmine Reynolds MD  Sirisha Valdez  1945  08/10/2023    Patient Active Problem List   Diagnosis     (ASCVD), s/p stenting in 1999, after an MI, repeat stenting in 2005 of the LAD and RCA at [facility] and stenting of the first OM branch of left circumflex on 6/26/2019.Clinically stable.    SSS (sick sinus syndrome), s/p permanent pacemaker implantion,     Palpitations    Dizziness    Dyslipidemia, currently on rosuvastatin 40 mg daily    Essential hypertension, blood pressure is running low associated with fatigue.    PVD (peripheral vascular disease)    Swelling of both lower extremities    Chronic left shoulder pain    S/P arthroscopy of shoulder    Coagulopathy    Bilateral malignant neoplasm of breast in female, estrogen receptor positive    Tobacco abuse    Aromatase inhibitor use    Osteopenia    Abnormal EKG    Abnormal nuclear stress test    Collagenous colitis    Pacemaker battery depletion    Stage 3b chronic kidney disease       Dear Yasmine Reynolds MD:    Tom Valdez is a 77 y.o. female with the problems as listed above, presents    Chief Complaint   Patient presents with    Follow-up     Routine, pt denies all cardiac symptoms.       History of Present Illness: Ms. Valdez is a pleasant 77-year-old  female with history of known CAD, status post previous stenting of the LAD and RCA in 1999 and 2005 at [facility], history of sick sinus syndrome, status post permanent pacemaker implantation in 2011 with battery replacement in December 2020 with Abbott device, and history of paroxysmal SVT, is here for regular cardiology follow-up.  On today's visit she denies any complaints of chest pains, shortness of breath, palpitations, dizziness or syncope.  Overall she feels pretty good.  She has long history of smoking and continues smoke about a pack a day.  She states she has tried nicotine patches which caused rash and Chantix made her feel  nabil.    Allergies   Allergen Reactions    Promethazine      hypotension   :    Current Outpatient Medications:     albuterol (PROAIR RESPICLICK) 108 (90 Base) MCG/ACT inhaler, Inhale Every 4 (Four) Hours As Needed for Wheezing., Disp: , Rfl:     anastrozole (ARIMIDEX) 1 MG tablet, Take 1 tablet by mouth Daily., Disp: , Rfl:     aspirin 81 MG EC tablet, Take 1 tablet by mouth Daily., Disp: , Rfl:     busPIRone (BUSPAR) 30 MG tablet, Take 0.5 tablets by mouth 2 (Two) Times a Day As Needed (anxiety)., Disp: , Rfl:     cilostazol (PLETAL) 50 MG tablet, Take 1 tablet by mouth 2 (Two) Times a Day., Disp: 60 tablet, Rfl: 5    diazePAM (VALIUM) 10 MG tablet, Take 1 tablet by mouth 2 (Two) Times a Day As Needed for Anxiety., Disp: , Rfl:     HYDROcodone-acetaminophen (NORCO) 7.5-325 MG per tablet, Take 1 tablet by mouth Every 6 (Six) Hours As Needed for Moderate Pain., Disp: , Rfl:     KLOR-CON 10 MEQ CR tablet, Take 1 tablet by mouth Daily., Disp: , Rfl:     levothyroxine (SYNTHROID, LEVOTHROID) 25 MCG tablet, , Disp: , Rfl:     metoprolol tartrate (LOPRESSOR) 50 MG tablet, TAKE 1 TABLET BY MOUTH TWICE A DAY, Disp: 180 tablet, Rfl: 1    mupirocin (BACTROBAN) 2 % ointment, Apply  topically to the appropriate area as directed 3 (Three) Times a Day., Disp: , Rfl:     nitroglycerin (NITROSTAT) 0.4 MG SL tablet, Place 1 tablet under the tongue Every 5 (Five) Minutes As Needed for Chest Pain. Take no more than 3 doses in 15 minutes., Disp: , Rfl:     NON FORMULARY, Antibiotic cream, Disp: , Rfl:     pantoprazole (PROTONIX) 20 MG EC tablet, Take 1 tablet by mouth Daily., Disp: , Rfl:     potassium chloride 10 MEQ CR tablet, Take 1 tablet by mouth Daily., Disp: 30 tablet, Rfl: 3    rosuvastatin (CRESTOR) 40 MG tablet, Take 1 tablet by mouth Every Night., Disp: 90 tablet, Rfl: 2    The following portions of the patient's history were reviewed and updated as appropriate: allergies, current medications, past family history, past  "medical history, past social history, past surgical history and problem list.    Social History     Tobacco Use    Smoking status: Every Day     Packs/day: 1.00     Years: 54.00     Pack years: 54.00     Types: Cigarettes     Start date: 1961    Smokeless tobacco: Never   Vaping Use    Vaping Use: Never used   Substance Use Topics    Alcohol use: No    Drug use: No     Review of Systems   Constitutional: Negative for chills and fever.   HENT:  Negative for nosebleeds and sore throat.    Respiratory:  Negative for cough, hemoptysis and wheezing.    Gastrointestinal:  Negative for abdominal pain, hematemesis, hematochezia, melena, nausea and vomiting.   Genitourinary:  Negative for dysuria and hematuria.   Neurological:  Negative for headaches.     Objective   Vitals:    08/10/23 1533   BP: 120/62   BP Location: Left arm   Patient Position: Sitting   Cuff Size: Adult   Pulse: 68   Resp: 18   SpO2: 93%   Weight: 59.4 kg (131 lb)   Height: 160 cm (63\")     Body mass index is 23.21 kg/mý.    Constitutional:       Appearance: Well-developed.   Eyes:      Conjunctiva/sclera: Conjunctivae normal.   HENT:      Head: Normocephalic.   Neck:      Thyroid: No thyromegaly.      Vascular: No JVD.      Trachea: No tracheal deviation.   Pulmonary:      Effort: No respiratory distress.      Breath sounds: Normal breath sounds. No wheezing. No rales.   Cardiovascular:      PMI at left midclavicular line. Normal rate. Regular rhythm. Normal S1. Normal S2.       Murmurs: There is no murmur.      No gallop.  No click. No rub.   Pulses:     Intact distal pulses.   Edema:     Ankle: bilateral 1+ edema of the ankle.     Feet: bilateral 1+ edema of the feet.  Abdominal:      General: Bowel sounds are normal.      Palpations: Abdomen is soft. There is no abdominal mass.      Tenderness: There is no abdominal tenderness.   Musculoskeletal:      Cervical back: Normal range of motion and neck supple. Skin:     General: Skin is warm and dry. "   Neurological:      Mental Status: Alert and oriented to person, place, and time.      Cranial Nerves: No cranial nerve deficit.       Assessment & Plan    Diagnosis Plan   1.  (ASCVD), s/p stenting in 1999, after an MI, repeat stenting in 2005 of the LAD and RCA at [facility] and stenting of the first OM branch of left circumflex on 6/26/2019.Clinically stable.        2. SSS (sick sinus syndrome), s/p permanent pacemaker implantation in 2011 with battery replacement in December 2020 with Abbotts device.        3. Paroxysmal SVT (supraventricular tachycardia)          Recommendations  Continue with aspirin, metoprolol and rosuvastatin at current doses.  I have reemphasized for her to quit smoking.      Return in about 6 months (around 2/10/2024).    As always, Yasmine Reynolds MD  I appreciate very much the opportunity to participate in the cardiovascular care of your patients. Please do not hesitate to call me with any questions with regards to Sirisha Valdez's evaluation and management.       With Best Regards,        Salvador Mccarthy MD, [facility]    Please note that portions of this note were completed with a voice recognition program.

## 2023-10-18 RX ORDER — CILOSTAZOL 50 MG/1
50 TABLET ORAL 2 TIMES DAILY
Qty: 60 TABLET | Refills: 5 | Status: SHIPPED | OUTPATIENT
Start: 2023-10-18

## 2024-01-30 ENCOUNTER — OFFICE VISIT (OUTPATIENT)
Age: 79
End: 2024-01-30
Payer: MEDICARE

## 2024-01-30 VITALS
DIASTOLIC BLOOD PRESSURE: 60 MMHG | OXYGEN SATURATION: 94 % | BODY MASS INDEX: 21.97 KG/M2 | WEIGHT: 124 LBS | SYSTOLIC BLOOD PRESSURE: 127 MMHG | HEIGHT: 63 IN | HEART RATE: 78 BPM

## 2024-01-30 DIAGNOSIS — I73.9 PERIPHERAL ARTERIAL DISEASE: ICD-10-CM

## 2024-01-30 DIAGNOSIS — Z72.0 TOBACCO ABUSE: ICD-10-CM

## 2024-01-30 DIAGNOSIS — I49.5 SSS (SICK SINUS SYNDROME): ICD-10-CM

## 2024-01-30 DIAGNOSIS — I47.10 PAROXYSMAL SVT (SUPRAVENTRICULAR TACHYCARDIA): ICD-10-CM

## 2024-01-30 DIAGNOSIS — I25.10 ARTERIOSCLEROTIC CARDIOVASCULAR DISEASE (ASCVD): Primary | ICD-10-CM

## 2024-01-30 PROCEDURE — 3078F DIAST BP <80 MM HG: CPT | Performed by: INTERNAL MEDICINE

## 2024-01-30 PROCEDURE — 93000 ELECTROCARDIOGRAM COMPLETE: CPT | Performed by: INTERNAL MEDICINE

## 2024-01-30 PROCEDURE — 3074F SYST BP LT 130 MM HG: CPT | Performed by: INTERNAL MEDICINE

## 2024-01-30 PROCEDURE — 99213 OFFICE O/P EST LOW 20 MIN: CPT | Performed by: INTERNAL MEDICINE

## 2024-01-30 RX ORDER — CILOSTAZOL 50 MG/1
50 TABLET ORAL 2 TIMES DAILY
Qty: 60 TABLET | Refills: 5 | Status: SHIPPED | OUTPATIENT
Start: 2024-01-30

## 2024-01-30 NOTE — PROGRESS NOTES
Yasmine Reynolds MD Beaulah ELVA José Miguel  1945  01/30/2024    Patient Active Problem List   Diagnosis     (ASCVD), s/p stenting in 1999, after an MI, repeat stenting in 2005 of the LAD and RCA at [facility] and stenting of the first OM branch of left circumflex on 6/26/2019.Clinically stable.    SSS (sick sinus syndrome), s/p permanent pacemaker implantion,     Palpitations    Dizziness    Dyslipidemia, currently on rosuvastatin 40 mg daily    Essential hypertension, blood pressure is running low associated with fatigue.    PVD (peripheral vascular disease)    Swelling of both lower extremities    Chronic left shoulder pain    S/P arthroscopy of shoulder    Coagulopathy    Bilateral malignant neoplasm of breast in female, estrogen receptor positive    Tobacco abuse    Aromatase inhibitor use    Osteopenia    Abnormal EKG    Abnormal nuclear stress test    Collagenous colitis    Pacemaker battery depletion    Stage 3b chronic kidney disease       Dear Yasmine Reynolds MD:    Tom     Juan Joseemma Valdez is a 78 y.o. female with the problems as listed above, presents    Chief complaint: Follow-up of coronary artery disease and sick sinus syndrome, status post permanent pacemaker implantation.    History of Present Illness: Ms. Medrano is a pleasant 78-year-old  female with history of known coronary disease, status post previous stenting of the LAD and RCA in 1999 in 2005 and history of sick sinus syndrome, s/p permanent dual-chamber pacemaker in 2011 with battery placement in 2020 with an Abbott device.  She also has underlying paroxysmal SVT.  She is here for regular cardiology follow-up.  On today's visit she denies any complaints of chest pains or shortness of breath or palpitations, dizziness or syncope.  He continues smoke about a pack a day.    Allergies   Allergen Reactions    Promethazine      hypotension   :    Current Outpatient Medications:     albuterol (PROAIR RESPICLICK)  108 (90 Base) MCG/ACT inhaler, Inhale Every 4 (Four) Hours As Needed for Wheezing., Disp: , Rfl:     anastrozole (ARIMIDEX) 1 MG tablet, Take 1 tablet by mouth Daily., Disp: , Rfl:     aspirin 81 MG EC tablet, Take 1 tablet by mouth Daily., Disp: , Rfl:     busPIRone (BUSPAR) 30 MG tablet, Take 0.5 tablets by mouth 2 (Two) Times a Day As Needed (anxiety)., Disp: , Rfl:     cilostazol (PLETAL) 50 MG tablet, Take 1 tablet by mouth 2 (Two) Times a Day., Disp: 60 tablet, Rfl: 5    diazePAM (VALIUM) 10 MG tablet, Take 1 tablet by mouth 2 (Two) Times a Day As Needed for Anxiety., Disp: , Rfl:     HYDROcodone-acetaminophen (NORCO) 7.5-325 MG per tablet, Take 1 tablet by mouth Every 6 (Six) Hours As Needed for Moderate Pain., Disp: , Rfl:     KLOR-CON 10 MEQ CR tablet, Take 1 tablet by mouth Daily., Disp: , Rfl:     levothyroxine (SYNTHROID, LEVOTHROID) 25 MCG tablet, , Disp: , Rfl:     metoprolol tartrate (LOPRESSOR) 50 MG tablet, TAKE 1 TABLET BY MOUTH TWICE A DAY, Disp: 180 tablet, Rfl: 1    mupirocin (BACTROBAN) 2 % ointment, Apply  topically to the appropriate area as directed 3 (Three) Times a Day., Disp: , Rfl:     nitroglycerin (NITROSTAT) 0.4 MG SL tablet, Place 1 tablet under the tongue Every 5 (Five) Minutes As Needed for Chest Pain. Take no more than 3 doses in 15 minutes., Disp: , Rfl:     NON FORMULARY, Antibiotic cream, Disp: , Rfl:     pantoprazole (PROTONIX) 20 MG EC tablet, Take 1 tablet by mouth Daily., Disp: , Rfl:     potassium chloride 10 MEQ CR tablet, Take 1 tablet by mouth Daily., Disp: 30 tablet, Rfl: 3    rosuvastatin (CRESTOR) 40 MG tablet, Take 1 tablet by mouth Every Night., Disp: 90 tablet, Rfl: 2    The following portions of the patient's history were reviewed and updated as appropriate: allergies, current medications, past family history, past medical history, past social history, past surgical history and problem list.    Social History     Tobacco Use    Smoking status: Every Day      "Packs/day: 1.00     Years: 54.00     Additional pack years: 0.00     Total pack years: 54.00     Types: Cigarettes     Start date: 1961    Smokeless tobacco: Never   Vaping Use    Vaping Use: Never used   Substance Use Topics    Alcohol use: No    Drug use: No     Review of Systems   Constitutional: Negative for chills and fever.   HENT:  Negative for nosebleeds and sore throat.    Respiratory:  Negative for cough, hemoptysis and wheezing.    Gastrointestinal:  Negative for abdominal pain, hematemesis, hematochezia, melena, nausea and vomiting.   Genitourinary:  Negative for dysuria and hematuria.   Neurological:  Negative for headaches.     Objective   Vitals:    01/30/24 1444 01/30/24 1455   BP: 155/89 127/60   BP Location: Left arm Left arm   Patient Position: Sitting Sitting   Cuff Size: Adult Adult   Pulse: 82 78   SpO2: 94% 94%   Weight: 56.2 kg (124 lb) 56.2 kg (124 lb)   Height: 160 cm (63\") 160 cm (63\")     Body mass index is 21.97 kg/m².    Vitals reviewed.   Constitutional:       Appearance: Well-developed.   Eyes:      Conjunctiva/sclera: Conjunctivae normal.   HENT:      Head: Normocephalic.   Neck:      Thyroid: No thyromegaly.      Vascular: No JVD.      Trachea: No tracheal deviation.   Pulmonary:      Effort: No respiratory distress.      Breath sounds: Normal breath sounds. No wheezing. No rales.   Cardiovascular:      PMI at left midclavicular line. Normal rate. Regular rhythm. Normal S1. Normal S2.       Murmurs: There is no murmur.      No gallop.  No click. No rub.   Pulses:     Intact distal pulses.   Edema:     Peripheral edema absent.   Abdominal:      General: Bowel sounds are normal.      Palpations: Abdomen is soft. There is no abdominal mass.      Tenderness: There is no abdominal tenderness.   Musculoskeletal:      Cervical back: Normal range of motion and neck supple. Skin:     General: Skin is warm and dry.   Neurological:      Mental Status: Alert and oriented to person, place, and " time.      Cranial Nerves: No cranial nerve deficit.       Lab Results   Component Value Date     05/11/2022    K 3.9 05/11/2022     05/11/2022    CO2 22.0 05/11/2022    BUN 11 05/11/2022    CREATININE 1.00 10/31/2022    GLUCOSE 84 05/11/2022    CALCIUM 9.4 05/11/2022    AST 20 05/11/2022    ALT 14 05/11/2022    ALKPHOS 68 05/11/2022    LABIL2 2.0 02/08/2021     Lab Results   Component Value Date    CKTOTAL 60 01/02/2016     Lab Results   Component Value Date    WBC 11.54 (H) 12/16/2020    HGB 13.4 12/16/2020    HCT 43.4 12/16/2020     12/16/2020     Lab Results   Component Value Date    INR 0.99 06/19/2018    INR <0.90 01/02/2016     Lab Results   Component Value Date    MG 1.7 05/11/2022     Lab Results   Component Value Date    TSH 2.280 05/11/2022    CHLPL 146 01/02/2016    TRIG 131 05/11/2022    HDL 51 05/11/2022    LDL 59 05/11/2022      Lab Results   Component Value Date    .0 (H) 06/19/2018       ECG 12 Lead    Date/Time: 1/30/2024 2:45 PM  Performed by: Salvador Mccarthy MD    Authorized by: Salvador Mccarthy MD  Comparison: compared with previous ECG from 2/21/2023  Similar to previous ECG  Rhythm: sinus rhythm  ST Segments: ST segments normal  T Waves: T waves normal  Comments: Normal sinus rhythm at baseline with some nonspecific ST-T wave changes and appropriate AV sequential pacing with magnet application.            Assessment & Plan    Diagnosis Plan   1.  (ASCVD), s/p stenting in 1999, after an MI, repeat stenting in 2005 of the LAD and RCA at [facility] and stenting of the first OM branch of left circumflex on 6/26/2019.Clinically stable.        2. SSS (sick sinus syndrome), s/p permanent pacemaker implantation in 2011 with battery replacement in December 2020 with Abbotts device.        3. Paroxysmal SVT (supraventricular tachycardia)        4. Tobacco abuse        5. Peripheral arterial disease clinically asymptomatic and stable.          Recommendations  For her  coronary artery disease, continue with aspirin and rosuvastatin as well as metoprolol.  For her peripheral artery disease, continue with cilostazol.  I have reemphasized for her to quit smoking.  She declined nicotine patches.  She could not tolerate Chantix in the past    Return in about 6 months (around 7/30/2024) for or sooner if needed.    As always, Yasmine Reynolds MD  I appreciate very much the opportunity to participate in the cardiovascular care of your patients. Please do not hesitate to call me with any questions with regards to Sirisha Valdez's evaluation and management.       With Best Regards,        Salvador Mccarthy MD, [facility]    Please note that portions of this note were completed with a voice recognition program.

## 2024-01-30 NOTE — LETTER
January 31, 2024     Yasmine Reynolds MD  [address]   [place] [address]    Patient: Sirisha Valdez   YOB: 1945   Date of Visit: 1/30/2024     Dear Yasmine Reynolds MD:       Thank you for referring Sirisha Valdez to me for evaluation. Below are the relevant portions of my assessment and plan of care.    If you have questions, please do not hesitate to call me. I look forward to following Sirisha along with you.         Sincerely,        Salvador Mccarthy MD        CC: No Recipients    Salvador Mccarthy MD  01/31/24 2004  Sign when Signing Visit  Yasmine Reynolds MD  Sirisha Valdez  1945  01/30/2024    Patient Active Problem List   Diagnosis   •  (ASCVD), s/p stenting in 1999, after an MI, repeat stenting in 2005 of the LAD and RCA at [facility] and stenting of the first OM branch of left circumflex on 6/26/2019.Clinically stable.   • SSS (sick sinus syndrome), s/p permanent pacemaker implantion,    • Palpitations   • Dizziness   • Dyslipidemia, currently on rosuvastatin 40 mg daily   • Essential hypertension, blood pressure is running low associated with fatigue.   • PVD (peripheral vascular disease)   • Swelling of both lower extremities   • Chronic left shoulder pain   • S/P arthroscopy of shoulder   • Coagulopathy   • Bilateral malignant neoplasm of breast in female, estrogen receptor positive   • Tobacco abuse   • Aromatase inhibitor use   • Osteopenia   • Abnormal EKG   • Abnormal nuclear stress test   • Collagenous colitis   • Pacemaker battery depletion   • Stage 3b chronic kidney disease       Dear Yasmine Reynolds MD:    Subjective     Sirisha Valdez is a 78 y.o. female with the problems as listed above, presents    Chief complaint: Follow-up of coronary artery disease and sick sinus syndrome, status post permanent pacemaker implantation.    History of Present Illness: Ms. Medrano is a pleasant 78-year-old  female with history of known coronary  disease, status post previous stenting of the LAD and RCA in 1999 in 2005 and history of sick sinus syndrome, s/p permanent dual-chamber pacemaker in 2011 with battery placement in 2020 with an Abbott device.  She also has underlying paroxysmal SVT.  She is here for regular cardiology follow-up.  On today's visit she denies any complaints of chest pains or shortness of breath or palpitations, dizziness or syncope.  He continues smoke about a pack a day.    Allergies   Allergen Reactions   • Promethazine      hypotension   :    Current Outpatient Medications:   •  albuterol (PROAIR RESPICLICK) 108 (90 Base) MCG/ACT inhaler, Inhale Every 4 (Four) Hours As Needed for Wheezing., Disp: , Rfl:   •  anastrozole (ARIMIDEX) 1 MG tablet, Take 1 tablet by mouth Daily., Disp: , Rfl:   •  aspirin 81 MG EC tablet, Take 1 tablet by mouth Daily., Disp: , Rfl:   •  busPIRone (BUSPAR) 30 MG tablet, Take 0.5 tablets by mouth 2 (Two) Times a Day As Needed (anxiety)., Disp: , Rfl:   •  cilostazol (PLETAL) 50 MG tablet, Take 1 tablet by mouth 2 (Two) Times a Day., Disp: 60 tablet, Rfl: 5  •  diazePAM (VALIUM) 10 MG tablet, Take 1 tablet by mouth 2 (Two) Times a Day As Needed for Anxiety., Disp: , Rfl:   •  HYDROcodone-acetaminophen (NORCO) 7.5-325 MG per tablet, Take 1 tablet by mouth Every 6 (Six) Hours As Needed for Moderate Pain., Disp: , Rfl:   •  KLOR-CON 10 MEQ CR tablet, Take 1 tablet by mouth Daily., Disp: , Rfl:   •  levothyroxine (SYNTHROID, LEVOTHROID) 25 MCG tablet, , Disp: , Rfl:   •  metoprolol tartrate (LOPRESSOR) 50 MG tablet, TAKE 1 TABLET BY MOUTH TWICE A DAY, Disp: 180 tablet, Rfl: 1  •  mupirocin (BACTROBAN) 2 % ointment, Apply  topically to the appropriate area as directed 3 (Three) Times a Day., Disp: , Rfl:   •  nitroglycerin (NITROSTAT) 0.4 MG SL tablet, Place 1 tablet under the tongue Every 5 (Five) Minutes As Needed for Chest Pain. Take no more than 3 doses in 15 minutes., Disp: , Rfl:   •  NON FORMULARY,  "Antibiotic cream, Disp: , Rfl:   •  pantoprazole (PROTONIX) 20 MG EC tablet, Take 1 tablet by mouth Daily., Disp: , Rfl:   •  potassium chloride 10 MEQ CR tablet, Take 1 tablet by mouth Daily., Disp: 30 tablet, Rfl: 3  •  rosuvastatin (CRESTOR) 40 MG tablet, Take 1 tablet by mouth Every Night., Disp: 90 tablet, Rfl: 2    The following portions of the patient's history were reviewed and updated as appropriate: allergies, current medications, past family history, past medical history, past social history, past surgical history and problem list.    Social History     Tobacco Use   • Smoking status: Every Day     Packs/day: 1.00     Years: 54.00     Additional pack years: 0.00     Total pack years: 54.00     Types: Cigarettes     Start date: 1961   • Smokeless tobacco: Never   Vaping Use   • Vaping Use: Never used   Substance Use Topics   • Alcohol use: No   • Drug use: No     Review of Systems   Constitutional: Negative for chills and fever.   HENT:  Negative for nosebleeds and sore throat.    Respiratory:  Negative for cough, hemoptysis and wheezing.    Gastrointestinal:  Negative for abdominal pain, hematemesis, hematochezia, melena, nausea and vomiting.   Genitourinary:  Negative for dysuria and hematuria.   Neurological:  Negative for headaches.     Objective   Vitals:    01/30/24 1444 01/30/24 1455   BP: 155/89 127/60   BP Location: Left arm Left arm   Patient Position: Sitting Sitting   Cuff Size: Adult Adult   Pulse: 82 78   SpO2: 94% 94%   Weight: 56.2 kg (124 lb) 56.2 kg (124 lb)   Height: 160 cm (63\") 160 cm (63\")     Body mass index is 21.97 kg/m².    Vitals reviewed.   Constitutional:       Appearance: Well-developed.   Eyes:      Conjunctiva/sclera: Conjunctivae normal.   HENT:      Head: Normocephalic.   Neck:      Thyroid: No thyromegaly.      Vascular: No JVD.      Trachea: No tracheal deviation.   Pulmonary:      Effort: No respiratory distress.      Breath sounds: Normal breath sounds. No wheezing. No " rales.   Cardiovascular:      PMI at left midclavicular line. Normal rate. Regular rhythm. Normal S1. Normal S2.       Murmurs: There is no murmur.      No gallop.  No click. No rub.   Pulses:     Intact distal pulses.   Edema:     Peripheral edema absent.   Abdominal:      General: Bowel sounds are normal.      Palpations: Abdomen is soft. There is no abdominal mass.      Tenderness: There is no abdominal tenderness.   Musculoskeletal:      Cervical back: Normal range of motion and neck supple. Skin:     General: Skin is warm and dry.   Neurological:      Mental Status: Alert and oriented to person, place, and time.      Cranial Nerves: No cranial nerve deficit.       Lab Results   Component Value Date     05/11/2022    K 3.9 05/11/2022     05/11/2022    CO2 22.0 05/11/2022    BUN 11 05/11/2022    CREATININE 1.00 10/31/2022    GLUCOSE 84 05/11/2022    CALCIUM 9.4 05/11/2022    AST 20 05/11/2022    ALT 14 05/11/2022    ALKPHOS 68 05/11/2022    LABIL2 2.0 02/08/2021     Lab Results   Component Value Date    CKTOTAL 60 01/02/2016     Lab Results   Component Value Date    WBC 11.54 (H) 12/16/2020    HGB 13.4 12/16/2020    HCT 43.4 12/16/2020     12/16/2020     Lab Results   Component Value Date    INR 0.99 06/19/2018    INR <0.90 01/02/2016     Lab Results   Component Value Date    MG 1.7 05/11/2022     Lab Results   Component Value Date    TSH 2.280 05/11/2022    CHLPL 146 01/02/2016    TRIG 131 05/11/2022    HDL 51 05/11/2022    LDL 59 05/11/2022      Lab Results   Component Value Date    .0 (H) 06/19/2018       ECG 12 Lead    Date/Time: 1/30/2024 2:45 PM  Performed by: Salvador Mccarthy MD    Authorized by: Salvador Mccarthy MD  Comparison: compared with previous ECG from 2/21/2023  Similar to previous ECG  Rhythm: sinus rhythm  ST Segments: ST segments normal  T Waves: T waves normal  Comments: Normal sinus rhythm at baseline with some nonspecific ST-T wave changes and appropriate AV  sequential pacing with magnet application.            Assessment & Plan    Diagnosis Plan   1.  (ASCVD), s/p stenting in 1999, after an MI, repeat stenting in 2005 of the LAD and RCA at [facility] and stenting of the first OM branch of left circumflex on 6/26/2019.Clinically stable.        2. SSS (sick sinus syndrome), s/p permanent pacemaker implantation in 2011 with battery replacement in December 2020 with Abbotts device.        3. Paroxysmal SVT (supraventricular tachycardia)        4. Tobacco abuse        5. Peripheral arterial disease clinically asymptomatic and stable.          Recommendations  For her coronary artery disease, continue with aspirin and rosuvastatin as well as metoprolol.  For her peripheral artery disease, continue with cilostazol.  I have reemphasized for her to quit smoking.  She declined nicotine patches.  She could not tolerate Chantix in the past    Return in about 6 months (around 7/30/2024) for or sooner if needed.    As always, Yasmine Reynolds MD  I appreciate very much the opportunity to participate in the cardiovascular care of your patients. Please do not hesitate to call me with any questions with regards to Sirisha Valdez's evaluation and management.       With Best Regards,        Salvador Mccarthy MD, [facility]    Please note that portions of this note were completed with a voice recognition program.

## 2024-03-19 ENCOUNTER — HOSPITAL ENCOUNTER (OUTPATIENT)
Dept: MAMMOGRAPHY | Facility: HOSPITAL | Age: 79
Discharge: HOME | End: 2024-03-19
Admitting: SURGERY
Payer: MEDICARE

## 2024-03-19 DIAGNOSIS — Z85.3 HISTORY OF BREAST CANCER: ICD-10-CM

## 2024-03-19 PROCEDURE — 77066 DX MAMMO INCL CAD BI: CPT | Performed by: RADIOLOGY

## 2024-03-19 PROCEDURE — G0279 TOMOSYNTHESIS, MAMMO: HCPCS | Performed by: RADIOLOGY

## 2024-03-19 PROCEDURE — G0279 TOMOSYNTHESIS, MAMMO: HCPCS

## 2024-03-19 PROCEDURE — 77066 DX MAMMO INCL CAD BI: CPT

## 2024-04-29 ENCOUNTER — OFFICE VISIT (OUTPATIENT)
Dept: SURGERY | Facility: CLINIC | Age: 79
End: 2024-04-29
Payer: MEDICARE

## 2024-04-29 VITALS — HEIGHT: 63 IN | WEIGHT: 117.8 LBS | BODY MASS INDEX: 20.87 KG/M2

## 2024-04-29 DIAGNOSIS — Z85.3 HISTORY OF BREAST CANCER: Primary | ICD-10-CM

## 2024-04-29 DIAGNOSIS — M85.852 OSTEOPENIA OF NECK OF LEFT FEMUR: ICD-10-CM

## 2024-04-29 PROCEDURE — 1160F RVW MEDS BY RX/DR IN RCRD: CPT | Performed by: SURGERY

## 2024-04-29 PROCEDURE — 1159F MED LIST DOCD IN RCRD: CPT | Performed by: SURGERY

## 2024-04-29 PROCEDURE — 93702 BIS XTRACELL FLUID ANALYSIS: CPT | Performed by: SURGERY

## 2024-04-29 PROCEDURE — 99213 OFFICE O/P EST LOW 20 MIN: CPT | Performed by: SURGERY

## 2024-04-29 NOTE — PROGRESS NOTES
Subjective   Sirisha Valdez is a 78 y.o. female here today for mammogram review.    History of Present Illness  Ms. Valdez was seen in the office today for breast cancer follow-up.  She is status post a left breast lumpectomy and sentinel lymph node biopsy, and a right breast lumpectomy on 12/12/17.  Final pathology demonstrated left T1bN0 infiltrating ductal carcinoma, ER positive, NC positive, HER-2 negative.  The anterior margin was positive for invasive tumor and other margins were positive for DCIS.  The right lumpectomy demonstrated DCIS, ER positive, NC positive with close but clear margins.  The patient underwent margin reexcision of the left and the right breast on 12/27/17.  The right lumpectomy showed no residual disease and the left side demonstrated a very small focus of DCIS.   Due to the patient's age and clear margin status the decision was made to forego radiation.  The patient was started on anastrozole in January, 2018.  Patient completed 5 years of therapy in May 2023 and anastrozole was discontinued.  The patient has no complaints referable to the breast other than occasional left lateral breast pain.  She denies any arm swelling or axillary adenopathy.  She denies any new bone or joint pain or other symptoms of metastatic disease.  Sirisha had a bilateral mammogram with tomosynthesis on 3/19/24 which demonstrated presumed postoperative changes for which a12-month follow-up was recommended.  Bone density was performed on 3/23 which demonstrated the diagnosis of osteopenia with a maximum T score of -1.2  Allergies   Allergen Reactions    Promethazine      hypotension       Current Outpatient Medications   Medication Sig Dispense Refill    albuterol (PROAIR RESPICLICK) 108 (90 Base) MCG/ACT inhaler Inhale Every 4 (Four) Hours As Needed for Wheezing.      busPIRone (BUSPAR) 30 MG tablet Take 0.5 tablets by mouth 2 (Two) Times a Day As Needed (anxiety).      cilostazol (PLETAL) 50 MG tablet Take 1  tablet by mouth 2 (Two) Times a Day. 60 tablet 5    diazePAM (VALIUM) 10 MG tablet Take 1 tablet by mouth 2 (Two) Times a Day As Needed for Anxiety.      HYDROcodone-acetaminophen (NORCO) 7.5-325 MG per tablet Take 1 tablet by mouth Every 6 (Six) Hours As Needed for Moderate Pain.      KLOR-CON 10 MEQ CR tablet Take 1 tablet by mouth Daily.      levothyroxine (SYNTHROID, LEVOTHROID) 25 MCG tablet       metoprolol tartrate (LOPRESSOR) 50 MG tablet TAKE 1 TABLET BY MOUTH TWICE A  tablet 1    mupirocin (BACTROBAN) 2 % ointment Apply  topically to the appropriate area as directed 3 (Three) Times a Day.      nitroglycerin (NITROSTAT) 0.4 MG SL tablet Place 1 tablet under the tongue Every 5 (Five) Minutes As Needed for Chest Pain. Take no more than 3 doses in 15 minutes.      NON FORMULARY Antibiotic cream      pantoprazole (PROTONIX) 20 MG EC tablet Take 1 tablet by mouth Daily.      potassium chloride 10 MEQ CR tablet Take 1 tablet by mouth Daily. 30 tablet 3    rosuvastatin (CRESTOR) 40 MG tablet Take 1 tablet by mouth Every Night. 90 tablet 2    aspirin 81 MG EC tablet Take 1 tablet by mouth Daily.       No current facility-administered medications for this visit.     Past Medical History:   Diagnosis Date    Anxiety     Breast cancer 11/28/2017    bilateral    CHF (congestive heart failure)     CKD (chronic kidney disease)     Coronary artery disease     3 HEART STENTS    Deep vein thrombosis     right lower extremity    Depression     Disease of thyroid gland     Gastroesophageal reflux     Glaucoma     Hyperlipidemia     Hypertension     MI (myocardial infarction)     Migraine     Peptic ulcer disease     Peripheral arterial disease     KIM LEG STENTS       ANDFEM POP    Peripheral vascular disease     SSS (sick sinus syndrome)     Status post placement of cardiac pacemaker     Wears dentures     Wears glasses      Past Surgical History:   Procedure Laterality Date    BREAST LUMPECTOMY Right 12/12/2017     "Procedure: BREAST LUMPECTOMY WITH NEEDLE LOCALIZATION;  Surgeon: Lida Babin MD;  Location: [facility] OR;  Service:     BREAST LUMPECTOMY Bilateral 12/27/2017    Procedure: right breast re-excision  left breast re-excision with 3 cm inferior and superior advancement flap;  Surgeon: Lida Babin MD;  Location: [facility] OR;  Service:     BREAST LUMPECTOMY WITH SENTINEL NODE BIOPSY AND AXILLARY NODE DISSECTION Left 12/12/2017    Procedure: Left BREAST LUMPECTOMY WITH SENTINEL NODE BIOPSY  Isotope and Lymphazurin injection left breast. Left breast preoperative needle localization;  Surgeon: Lida Babin MD;  Location: [facility] OR;  Service:     BREAST SURGERY      Mass Removal.    CARDIAC CATHETERIZATION      CARDIAC CATHETERIZATION N/A 06/26/2019    Procedure: Left Heart Cath;  Surgeon: Salvador Mccarthy MD;  Location: [facility] CATH INVASIVE LOCATION;  Service: Cardiology    CARDIAC ELECTROPHYSIOLOGY PROCEDURE Left 12/16/2020    Procedure: PPM generator change - dual;  Surgeon: Salvador Mccarthy MD;  Location: [facility] CATH INVASIVE LOCATION;  Service: Cardiology;  Laterality: Left;    CARPAL TUNNEL RELEASE      CATARACT EXTRACTION Bilateral     ESOPHAGUS SURGERY      FEMORAL DISTAL BYPASS      FEMORAL FEMORAL BYPASS      HIP SURGERY Right     HYSTERECTOMY      benign    PACEMAKER IMPLANTATION      IN RT/LT HEART CATHETERS N/A 06/26/2019    Procedure: Percutaneous Coronary Intervention;  Surgeon: Gus Suero MD;  Location: [facility] CATH INVASIVE LOCATION;  Service: Cardiology    SHOULDER ARTHROSCOPY W/ ROTATOR CUFF REPAIR Left 03/08/2017    Procedure: LEFT SHOULDER ARTHROSCOPY, ACROMIOPLASTY, SUBACROMIC DEBRIDMENT;  Surgeon: Tesfaye Polo MD;  Location: [facility] OR;  Service:     SHOULDER SURGERY         Pertinent Review of Systems  Patient states that her bone pain improved with discontinuing the anastrozole but states she now has rheumatoid arthritis    Objective   Ht 160 cm (63\")   Wt 53.4 kg (117 " lb 12.8 oz)   BMI 20.87 kg/m²    Physical Exam  General:  This is a WD WN female in no acute distress  Vital signs stable, afebrile  HEENT exam:  WNL. Sclera are anicteric.  EOMI  Neck:  supple, FROM.  No JVD.  Trachea midline.  No palpable thyroid nodules. No supraclavicular adenopathy  Lungs:  Respiratory effort normal. Auscultation: Clear, without wheezes, rhonchi, rales  Heart:  Regular rate and rhythm, without murmur, gallop, rub.  No pedal edema  Breasts: On visual inspection there is some mild volume loss on the left..  Examination of the right breast demonstrates a well-healed circumareolar scar, without discrete mass, skin change, axillary adenopathy.  Examination of the left breast demonstrates a well-healed circumareolar scar, without discrete mass, skin change, axillary adenopathy.  Upper extremity: Without edema  I have reviewed the copied text and it is accurate as of 4/29/2024     Procedures   L-Dex® Analysis for Lymphedema  The patient had a SOZO measurement which I reviewed today. The score is   10.6, with the prior on 3/20/2023 being 3.1, see scanned to EMR. Bioimpedance spectroscopy helps identify the   onset of lymphedema in an arm or leg before patients experience noticeable swelling. Research has   shown that 92% of patients with early detection of lymphedema using L-Dex combined with   intervention do not progress to chronic lymphedema through three years. Additionally, as of March 2023, the NCCN Guidelines® for Survivorship recommend proactive screening for lymphedema using   bioimpedance spectroscopy. Whenever possible, patients are tested for baseline L-Dex score before   cancer treatment begins and then are reassessed during regular follow-up visits using the SOZO device.   Otherwise, this can be started postoperatively and continued during regular follow-up visits. If the   patient´s L-Dex score increases above normal levels, that is a sign that lymphedema is developing and a    referral is made to physical therapy for further evaluation and early compression treatment.   Lymphedema assessment with the SOZO L-Dex score is recommended to be done every 3 months for   the first 3 years and then every 6 months for years 4 and 5 followed by annually afterwards  Results/Data:  Imaging: Mammogram reports and/19/24 were reviewed.  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  Grade 1 infiltrating ductal carcinoma of the left breast T1b N0 MX, ER positive, GA positive  Both sites with clear margins on reexcision  New diagnosis of osteopenia  Status post completion of 5 years of AI with subsequent discontinuation of anastrozole    Plan: Bilateral mammogram and bone density and March, 2025 with return to the office following       Discussion/Summary:    Time spent:     BMI is within normal parameters. No other follow-up for BMI required.         Future Appointments   Date Time Provider Department Center   7/30/2024  3:30 PM Salvador Mccarthy MD [facility]       Please note that portions of this note were completed with a voice recognition program.

## 2024-04-30 DIAGNOSIS — Z85.3 HISTORY OF BREAST CANCER: ICD-10-CM

## 2024-04-30 DIAGNOSIS — M85.852 OSTEOPENIA OF NECK OF LEFT FEMUR: Primary | ICD-10-CM

## 2024-05-14 ENCOUNTER — TELEPHONE (OUTPATIENT)
Dept: CARDIOLOGY | Facility: CLINIC | Age: 79
End: 2024-05-14

## 2024-05-14 NOTE — TELEPHONE ENCOUNTER
Caller: Sirisha Valdez    Relationship: Self    Best call back number: [phone]    What is the best time to reach you: ANYTIME    Who are you requesting to speak with (clinical staff, provider,  specific staff member): RICHIE    What was the call regarding: PATIENT WOULD LIKE TO SPEAK TO RICHIE ABOUT A PACEMAKER CHECK AND PAIN DOWN THE LEFT SIDE OF HER BREAST. PATIENT STATES THE PAIN HAS BEEN HAPPENING ONCE A WEEK FOR A FEW WEEKS.

## 2024-07-30 RX ORDER — CILOSTAZOL 50 MG/1
50 TABLET ORAL 2 TIMES DAILY
Qty: 60 TABLET | Refills: 5 | Status: SHIPPED | OUTPATIENT
Start: 2024-07-30

## 2024-08-27 ENCOUNTER — OFFICE VISIT (OUTPATIENT)
Age: 79
End: 2024-08-27
Payer: MEDICARE

## 2024-08-27 VITALS
HEART RATE: 83 BPM | BODY MASS INDEX: 19.31 KG/M2 | HEIGHT: 63 IN | SYSTOLIC BLOOD PRESSURE: 130 MMHG | OXYGEN SATURATION: 98 % | WEIGHT: 109 LBS | DIASTOLIC BLOOD PRESSURE: 68 MMHG

## 2024-08-27 DIAGNOSIS — I25.10 ARTERIOSCLEROTIC CARDIOVASCULAR DISEASE (ASCVD): Primary | ICD-10-CM

## 2024-08-27 DIAGNOSIS — I49.5 SSS (SICK SINUS SYNDROME): ICD-10-CM

## 2024-08-27 DIAGNOSIS — I47.10 PAROXYSMAL SVT (SUPRAVENTRICULAR TACHYCARDIA): ICD-10-CM

## 2024-08-27 DIAGNOSIS — I73.9 PERIPHERAL ARTERIAL DISEASE: ICD-10-CM

## 2024-08-27 DIAGNOSIS — Z72.0 TOBACCO ABUSE: ICD-10-CM

## 2024-08-27 PROCEDURE — 93000 ELECTROCARDIOGRAM COMPLETE: CPT | Performed by: INTERNAL MEDICINE

## 2024-08-27 PROCEDURE — 99214 OFFICE O/P EST MOD 30 MIN: CPT | Performed by: INTERNAL MEDICINE

## 2024-08-27 PROCEDURE — 3075F SYST BP GE 130 - 139MM HG: CPT | Performed by: INTERNAL MEDICINE

## 2024-08-27 PROCEDURE — 3078F DIAST BP <80 MM HG: CPT | Performed by: INTERNAL MEDICINE

## 2024-08-27 RX ORDER — METOPROLOL TARTRATE 50 MG
50 TABLET ORAL 2 TIMES DAILY
Qty: 180 TABLET | Refills: 1 | Status: SHIPPED | OUTPATIENT
Start: 2024-08-27

## 2024-08-27 RX ORDER — ASPIRIN 81 MG/1
81 TABLET ORAL DAILY
Qty: 100 TABLET | Refills: 3 | Status: SHIPPED | OUTPATIENT
Start: 2024-08-27

## 2024-08-27 RX ORDER — CILOSTAZOL 50 MG/1
50 TABLET ORAL 2 TIMES DAILY
Qty: 180 TABLET | Refills: 1 | Status: SHIPPED | OUTPATIENT
Start: 2024-08-27

## 2024-08-27 RX ORDER — ROSUVASTATIN CALCIUM 40 MG/1
40 TABLET, COATED ORAL NIGHTLY
Qty: 90 TABLET | Refills: 2 | Status: SHIPPED | OUTPATIENT
Start: 2024-08-27

## 2024-08-27 NOTE — LETTER
August 27, 2024     Lencho Gutierrez MD  [address]   [place] [address]    Patient: Sirisha Valdez   YOB: 1945   Date of Visit: 8/27/2024     Dear Lencho Gutierrez MD:       Thank you for referring Sirisha Valdez to me for evaluation. Below are the relevant portions of my assessment and plan of care.    If you have questions, please do not hesitate to call me. I look forward to following Sirisha along with you.         Sincerely,        Salvador Mccarthy MD        CC: No Recipients    Salvador Mccarthy MD  08/27/24 1749  Sign when Signing Visit  Lencho Gutierrez MD  Sirisha Valdez  1945  08/27/2024    Patient Active Problem List   Diagnosis   •  (ASCVD), s/p stenting in 1999, after an MI, repeat stenting in 2005 of the LAD and RCA at [facility] and stenting of the first OM branch of left circumflex on 6/26/2019.Clinically stable.   • SSS (sick sinus syndrome), s/p permanent pacemaker implantion,    • Palpitations   • Dizziness   • Dyslipidemia, currently on rosuvastatin 40 mg daily   • Essential hypertension, blood pressure is running low associated with fatigue.   • PVD (peripheral vascular disease)   • Swelling of both lower extremities   • Chronic left shoulder pain   • S/P arthroscopy of shoulder   • Coagulopathy   • Bilateral malignant neoplasm of breast in female, estrogen receptor positive   • Tobacco abuse   • Aromatase inhibitor use   • Osteopenia   • Abnormal EKG   • Abnormal nuclear stress test   • Collagenous colitis   • Pacemaker battery depletion   • Stage 3b chronic kidney disease       Dear Lencho Gutierrez MD:    Subjective    Sirisha Valdez is a 78 y.o. female with the problems as listed above, presents    Chief complaint: Follow-up of coronary artery disease and sick sinus syndrome, status post permanent pacemaker implantation.    History of Present Illness: Mr. Monica Valdez is a pleasant 78-year-old  female with history of known coronary artery disease, status post  previous stenting of the LAD and RCA in 1999 and 2005 respectively and history of sick sinus syndrome, status post permanent dual-chamber pacemaker implantation in 2011 with battery replacement in 2020 with an Abbott device.  She presents today for regular cardiology follow-up.  On today's visit she denies any complaints of palpitations, dizziness or syncope.  She denies any chest pains or shortness of breath.  She denies any leg claudication.    Allergies   Allergen Reactions   • Promethazine      hypotension   :    Current Outpatient Medications:   •  albuterol (PROAIR RESPICLICK) 108 (90 Base) MCG/ACT inhaler, Inhale Every 4 (Four) Hours As Needed for Wheezing., Disp: , Rfl:   •  aspirin 81 MG EC tablet, Take 1 tablet by mouth Daily., Disp: , Rfl:   •  busPIRone (BUSPAR) 30 MG tablet, Take 0.5 tablets by mouth 2 (Two) Times a Day As Needed (anxiety)., Disp: , Rfl:   •  cilostazol (PLETAL) 50 MG tablet, Take 1 tablet by mouth 2 (Two) Times a Day., Disp: 60 tablet, Rfl: 5  •  diazePAM (VALIUM) 10 MG tablet, Take 1 tablet by mouth 2 (Two) Times a Day As Needed for Anxiety., Disp: , Rfl:   •  HYDROcodone-acetaminophen (NORCO) 7.5-325 MG per tablet, Take 1 tablet by mouth Every 6 (Six) Hours As Needed for Moderate Pain., Disp: , Rfl:   •  KLOR-CON 10 MEQ CR tablet, Take 1 tablet by mouth Daily., Disp: , Rfl:   •  levothyroxine (SYNTHROID, LEVOTHROID) 25 MCG tablet, , Disp: , Rfl:   •  metoprolol tartrate (LOPRESSOR) 50 MG tablet, TAKE 1 TABLET BY MOUTH TWICE A DAY, Disp: 180 tablet, Rfl: 1  •  mupirocin (BACTROBAN) 2 % ointment, Apply  topically to the appropriate area as directed 3 (Three) Times a Day., Disp: , Rfl:   •  nitroglycerin (NITROSTAT) 0.4 MG SL tablet, Place 1 tablet under the tongue Every 5 (Five) Minutes As Needed for Chest Pain. Take no more than 3 doses in 15 minutes., Disp: , Rfl:   •  NON FORMULARY, Antibiotic cream, Disp: , Rfl:   •  pantoprazole (PROTONIX) 20 MG EC tablet, Take 1 tablet by mouth  "Daily., Disp: , Rfl:   •  potassium chloride 10 MEQ CR tablet, Take 1 tablet by mouth Daily., Disp: 30 tablet, Rfl: 3  •  rosuvastatin (CRESTOR) 40 MG tablet, Take 1 tablet by mouth Every Night., Disp: 90 tablet, Rfl: 2    The following portions of the patient's history were reviewed and updated as appropriate: allergies, current medications, past family history, past medical history, past social history, past surgical history and problem list.    Social History     Tobacco Use   • Smoking status: Every Day     Current packs/day: 1.00     Average packs/day: 1 pack/day for 63.7 years (63.7 ttl pk-yrs)     Types: Cigarettes     Start date: 1961   • Smokeless tobacco: Never   Vaping Use   • Vaping status: Never Used   Substance Use Topics   • Alcohol use: No   • Drug use: No     Review of Systems   Constitutional: Negative for chills and fever.   HENT:  Negative for nosebleeds and sore throat.    Respiratory:  Negative for cough, hemoptysis and wheezing.    Gastrointestinal:  Negative for abdominal pain, hematemesis, hematochezia, melena, nausea and vomiting.   Genitourinary:  Negative for dysuria and hematuria.   Neurological:  Negative for headaches.     Objective  Vitals:    08/27/24 1514   BP: 130/68   BP Location: Left arm   Patient Position: Sitting   Cuff Size: Adult   Pulse: 83   SpO2: 98%   Weight: 49.4 kg (109 lb)   Height: 160 cm (63\")     Body mass index is 19.31 kg/m².    Vitals reviewed.   Constitutional:       Appearance: Well-developed.   Eyes:      Conjunctiva/sclera: Conjunctivae normal.   HENT:      Head: Normocephalic.   Neck:      Thyroid: No thyromegaly.      Vascular: No JVD.      Trachea: No tracheal deviation.   Pulmonary:      Effort: No respiratory distress.      Breath sounds: Normal breath sounds. No wheezing. No rales.   Cardiovascular:      PMI at left midclavicular line. Normal rate. Regular rhythm. Normal S1. Normal S2.       Murmurs: There is no murmur.      No gallop.  No click. No " rub.   Pulses:     Intact distal pulses.   Edema:     Peripheral edema absent.   Abdominal:      General: Bowel sounds are normal.      Palpations: Abdomen is soft. There is no abdominal mass.      Tenderness: There is no abdominal tenderness.   Musculoskeletal:      Cervical back: Normal range of motion and neck supple. Skin:     General: Skin is warm and dry.   Neurological:      Mental Status: Alert and oriented to person, place, and time.      Cranial Nerves: No cranial nerve deficit.         Lab Results   Component Value Date     05/11/2022    K 3.9 05/11/2022     05/11/2022    CO2 22.0 05/11/2022    BUN 11 05/11/2022    CREATININE 1.00 10/31/2022    GLUCOSE 84 05/11/2022    CALCIUM 9.4 05/11/2022    AST 20 05/11/2022    ALT 14 05/11/2022    ALKPHOS 68 05/11/2022    LABIL2 2.0 02/08/2021     Lab Results   Component Value Date    CKTOTAL 60 01/02/2016     Lab Results   Component Value Date    WBC 11.54 (H) 12/16/2020    HGB 13.4 12/16/2020    HCT 43.4 12/16/2020     12/16/2020     Lab Results   Component Value Date    INR 0.95 07/27/2024    INR 0.99 06/19/2018    INR <0.90 01/02/2016     Lab Results   Component Value Date    MG 2.1 07/27/2024     Lab Results   Component Value Date    TSH 2.280 05/11/2022    CHLPL 146 01/02/2016    TRIG 131 05/11/2022    HDL 51 05/11/2022    LDL 59 05/11/2022      Lab Results   Component Value Date    .0 (H) 06/19/2018       ECG 12 Lead    Date/Time: 8/27/2024 3:08 PM  Performed by: Salvador Mccarthy MD    Authorized by: Salvador Mccarthy MD  Comparison: compared with previous ECG from 1/30/2024  Similar to previous ECG  Rhythm: sinus rhythm  Conduction: conduction normal  ST Segments: ST segments normal  T Waves: T waves normal    Clinical impression: normal ECG  Comments: Appropriate AV sequential pacing with magnet application.            Assessment & Plan   Diagnosis Plan   1.  (ASCVD), s/p stenting in 1999, after an MI, repeat stenting in 2005 of the  LAD and RCA at [facility] and stenting of the first OM branch of left circumflex on 6/26/2019.Clinically stable.        2. SSS (sick sinus syndrome), s/p permanent pacemaker implantation in 2011 with battery replacement in December 2020 with Abbotts device.        3. Paroxysmal SVT (supraventricular tachycardia)        4. Tobacco abuse        5. Peripheral arterial disease with some intermittent leg claudication          Recommendations  Continue with low-dose aspirin, metoprolol succinate and rosuvastatin for CAD.  Continue with cilostazol for her PAD.  I have strongly reemphasized about smoking cessation.  She states she has tried nicotine patches and Chantix which apparently did not work.  For her PAD, she has was seen by Dr. Esposito (cardiothoracic surgeon) before who apparently said that there is nothing he can do as long as she is smoking.    Return in about 5 months (around 1/27/2025).    As always, Lencho Gutierrez MD  I appreciate very much the opportunity to participate in the cardiovascular care of your patients. Please do not hesitate to call me with any questions with regards to Sirisha Valdez's evaluation and management.       With Best Regards,        Salvador Mccarthy MD, FACC    Please note that portions of this note were completed with a voice recognition program.

## 2024-08-27 NOTE — PROGRESS NOTES
Lencho Gutierrez MD  Sirisha Valdez  1945  08/27/2024    Patient Active Problem List   Diagnosis     (ASCVD), s/p stenting in 1999, after an MI, repeat stenting in 2005 of the LAD and RCA at [facility] and stenting of the first OM branch of left circumflex on 6/26/2019.Clinically stable.    SSS (sick sinus syndrome), s/p permanent pacemaker implantion,     Palpitations    Dizziness    Dyslipidemia, currently on rosuvastatin 40 mg daily    Essential hypertension, blood pressure is running low associated with fatigue.    PVD (peripheral vascular disease)    Swelling of both lower extremities    Chronic left shoulder pain    S/P arthroscopy of shoulder    Coagulopathy    Bilateral malignant neoplasm of breast in female, estrogen receptor positive    Tobacco abuse    Aromatase inhibitor use    Osteopenia    Abnormal EKG    Abnormal nuclear stress test    Collagenous colitis    Pacemaker battery depletion    Stage 3b chronic kidney disease       Lencho Donald MD:    Tom Valdez is a 78 y.o. female with the problems as listed above, presents    Chief complaint: Follow-up of coronary artery disease and sick sinus syndrome, status post permanent pacemaker implantation.    History of Present Illness: Mr. Monica Valdez is a pleasant 78-year-old  female with history of known coronary artery disease, status post previous stenting of the LAD and RCA in 1999 and 2005 respectively and history of sick sinus syndrome, status post permanent dual-chamber pacemaker implantation in 2011 with battery replacement in 2020 with an [employer] device.  She presents today for regular cardiology follow-up.  On today's visit she denies any complaints of palpitations, dizziness or syncope.  She denies any chest pains or shortness of breath.  She denies any leg claudication.    Allergies   Allergen Reactions    Promethazine      hypotension   :    Current Outpatient Medications:     albuterol (PROAIR  RESPICLICK) 108 (90 Base) MCG/ACT inhaler, Inhale Every 4 (Four) Hours As Needed for Wheezing., Disp: , Rfl:     aspirin 81 MG EC tablet, Take 1 tablet by mouth Daily., Disp: , Rfl:     busPIRone (BUSPAR) 30 MG tablet, Take 0.5 tablets by mouth 2 (Two) Times a Day As Needed (anxiety)., Disp: , Rfl:     cilostazol (PLETAL) 50 MG tablet, Take 1 tablet by mouth 2 (Two) Times a Day., Disp: 60 tablet, Rfl: 5    diazePAM (VALIUM) 10 MG tablet, Take 1 tablet by mouth 2 (Two) Times a Day As Needed for Anxiety., Disp: , Rfl:     HYDROcodone-acetaminophen (NORCO) 7.5-325 MG per tablet, Take 1 tablet by mouth Every 6 (Six) Hours As Needed for Moderate Pain., Disp: , Rfl:     KLOR-CON 10 MEQ CR tablet, Take 1 tablet by mouth Daily., Disp: , Rfl:     levothyroxine (SYNTHROID, LEVOTHROID) 25 MCG tablet, , Disp: , Rfl:     metoprolol tartrate (LOPRESSOR) 50 MG tablet, TAKE 1 TABLET BY MOUTH TWICE A DAY, Disp: 180 tablet, Rfl: 1    mupirocin (BACTROBAN) 2 % ointment, Apply  topically to the appropriate area as directed 3 (Three) Times a Day., Disp: , Rfl:     nitroglycerin (NITROSTAT) 0.4 MG SL tablet, Place 1 tablet under the tongue Every 5 (Five) Minutes As Needed for Chest Pain. Take no more than 3 doses in 15 minutes., Disp: , Rfl:     NON FORMULARY, Antibiotic cream, Disp: , Rfl:     pantoprazole (PROTONIX) 20 MG EC tablet, Take 1 tablet by mouth Daily., Disp: , Rfl:     potassium chloride 10 MEQ CR tablet, Take 1 tablet by mouth Daily., Disp: 30 tablet, Rfl: 3    rosuvastatin (CRESTOR) 40 MG tablet, Take 1 tablet by mouth Every Night., Disp: 90 tablet, Rfl: 2    The following portions of the patient's history were reviewed and updated as appropriate: allergies, current medications, past family history, past medical history, past social history, past surgical history and problem list.    Social History     Tobacco Use    Smoking status: Every Day     Current packs/day: 1.00     Average packs/day: 1 pack/day for 63.7 years  "(63.7 ttl pk-yrs)     Types: Cigarettes     Start date: 1961    Smokeless tobacco: Never   Vaping Use    Vaping status: Never Used   Substance Use Topics    Alcohol use: No    Drug use: No     Review of Systems   Constitutional: Negative for chills and fever.   HENT:  Negative for nosebleeds and sore throat.    Respiratory:  Negative for cough, hemoptysis and wheezing.    Gastrointestinal:  Negative for abdominal pain, hematemesis, hematochezia, melena, nausea and vomiting.   Genitourinary:  Negative for dysuria and hematuria.   Neurological:  Negative for headaches.     Objective   Vitals:    08/27/24 1514   BP: 130/68   BP Location: Left arm   Patient Position: Sitting   Cuff Size: Adult   Pulse: 83   SpO2: 98%   Weight: 49.4 kg (109 lb)   Height: 160 cm (63\")     Body mass index is 19.31 kg/m².    Vitals reviewed.   Constitutional:       Appearance: Well-developed.   Eyes:      Conjunctiva/sclera: Conjunctivae normal.   HENT:      Head: Normocephalic.   Neck:      Thyroid: No thyromegaly.      Vascular: No JVD.      Trachea: No tracheal deviation.   Pulmonary:      Effort: No respiratory distress.      Breath sounds: Normal breath sounds. No wheezing. No rales.   Cardiovascular:      PMI at left midclavicular line. Normal rate. Regular rhythm. Normal S1. Normal S2.       Murmurs: There is no murmur.      No gallop.  No click. No rub.   Pulses:     Intact distal pulses.   Edema:     Peripheral edema absent.   Abdominal:      General: Bowel sounds are normal.      Palpations: Abdomen is soft. There is no abdominal mass.      Tenderness: There is no abdominal tenderness.   Musculoskeletal:      Cervical back: Normal range of motion and neck supple. Skin:     General: Skin is warm and dry.   Neurological:      Mental Status: Alert and oriented to person, place, and time.      Cranial Nerves: No cranial nerve deficit.         Lab Results   Component Value Date     05/11/2022    K 3.9 05/11/2022     " 05/11/2022    CO2 22.0 05/11/2022    BUN 11 05/11/2022    CREATININE 1.00 10/31/2022    GLUCOSE 84 05/11/2022    CALCIUM 9.4 05/11/2022    AST 20 05/11/2022    ALT 14 05/11/2022    ALKPHOS 68 05/11/2022    LABIL2 2.0 02/08/2021     Lab Results   Component Value Date    CKTOTAL 60 01/02/2016     Lab Results   Component Value Date    WBC 11.54 (H) 12/16/2020    HGB 13.4 12/16/2020    HCT 43.4 12/16/2020     12/16/2020     Lab Results   Component Value Date    INR 0.95 07/27/2024    INR 0.99 06/19/2018    INR <0.90 01/02/2016     Lab Results   Component Value Date    MG 2.1 07/27/2024     Lab Results   Component Value Date    TSH 2.280 05/11/2022    CHLPL 146 01/02/2016    TRIG 131 05/11/2022    HDL 51 05/11/2022    LDL 59 05/11/2022      Lab Results   Component Value Date    .0 (H) 06/19/2018       ECG 12 Lead    Date/Time: 8/27/2024 3:08 PM  Performed by: Salvador Mccarthy MD    Authorized by: Salvador Mccarthy MD  Comparison: compared with previous ECG from 1/30/2024  Similar to previous ECG  Rhythm: sinus rhythm  Conduction: conduction normal  ST Segments: ST segments normal  T Waves: T waves normal    Clinical impression: normal ECG  Comments: Appropriate AV sequential pacing with magnet application.            Assessment & Plan    Diagnosis Plan   1.  (ASCVD), s/p stenting in 1999, after an MI, repeat stenting in 2005 of the LAD and RCA at [facility] and stenting of the first OM branch of left circumflex on 6/26/2019.Clinically stable.        2. SSS (sick sinus syndrome), s/p permanent pacemaker implantation in 2011 with battery replacement in December 2020 with [employer] device.        3. Paroxysmal SVT (supraventricular tachycardia)        4. Tobacco abuse        5. Peripheral arterial disease with some intermittent leg claudication          Recommendations  Continue with low-dose aspirin, metoprolol succinate and rosuvastatin for CAD.  Continue with cilostazol for her PAD.  I have strongly  reemphasized about smoking cessation.  She states she has tried nicotine patches and Chantix which apparently did not work.  For her PAD, she has was seen by Dr. Esposito (cardiothoracic surgeon) before who apparently said that there is nothing he can do as long as she is smoking.    Return in about 5 months (around 1/27/2025).    As always, Lencho Gutiererz MD  I appreciate very much the opportunity to participate in the cardiovascular care of your patients. Please do not hesitate to call me with any questions with regards to Sirisha Valdez's evaluation and management.       With Best Regards,        Salvador Mccarthy MD, [facility]    Please note that portions of this note were completed with a voice recognition program.

## 2024-09-12 ENCOUNTER — CLINICAL SUPPORT NO REQUIREMENTS (OUTPATIENT)
Dept: CARDIOLOGY | Facility: CLINIC | Age: 79
End: 2024-09-12
Payer: MEDICARE

## 2024-09-12 DIAGNOSIS — Z95.0 CARDIAC PACEMAKER IN SITU: Primary | ICD-10-CM

## 2024-09-30 ENCOUNTER — HOSPITAL ENCOUNTER (EMERGENCY)
Facility: HOSPITAL | Age: 79
Discharge: HOME | End: 2024-10-01
Attending: EMERGENCY MEDICINE
Payer: MEDICARE

## 2024-09-30 ENCOUNTER — APPOINTMENT (OUTPATIENT)
Dept: CT IMAGING | Facility: HOSPITAL | Age: 79
End: 2024-09-30
Payer: MEDICARE

## 2024-09-30 DIAGNOSIS — N39.0 ACUTE UTI: Primary | ICD-10-CM

## 2024-09-30 LAB
ALBUMIN SERPL-MCNC: 3.5 G/DL (ref 3.5–5.2)
ALBUMIN/GLOB SERPL: 1.2 G/DL
ALP SERPL-CCNC: 73 U/L (ref 39–117)
ALT SERPL W P-5'-P-CCNC: 7 U/L (ref 1–33)
ANION GAP SERPL CALCULATED.3IONS-SCNC: 12.3 MMOL/L (ref 5–15)
ANION GAP SERPL CALCULATED.3IONS-SCNC: 9 MMOL/L (ref 5–15)
AST SERPL-CCNC: 14 U/L (ref 1–32)
BACTERIA UR QL AUTO: ABNORMAL /HPF
BASOPHILS # BLD AUTO: 0.07 10*3/MM3 (ref 0–0.2)
BASOPHILS NFR BLD AUTO: 0.7 % (ref 0–1.5)
BILIRUB SERPL-MCNC: 0.2 MG/DL (ref 0–1.2)
BILIRUB UR QL STRIP: NEGATIVE
BUN SERPL-MCNC: 14 MG/DL (ref 8–23)
BUN SERPL-MCNC: 16 MG/DL (ref 8–23)
BUN/CREAT SERPL: 10.2 (ref 7–25)
BUN/CREAT SERPL: 9.9 (ref 7–25)
CALCIUM SPEC-SCNC: 8.7 MG/DL (ref 8.6–10.5)
CALCIUM SPEC-SCNC: 9 MG/DL (ref 8.6–10.5)
CHLORIDE SERPL-SCNC: 100 MMOL/L (ref 98–107)
CHLORIDE SERPL-SCNC: 107 MMOL/L (ref 98–107)
CLARITY UR: ABNORMAL
CO2 SERPL-SCNC: 17.7 MMOL/L (ref 22–29)
CO2 SERPL-SCNC: 20 MMOL/L (ref 22–29)
COLOR UR: YELLOW
CREAT SERPL-MCNC: 1.42 MG/DL (ref 0.57–1)
CREAT SERPL-MCNC: 1.57 MG/DL (ref 0.57–1)
CRP SERPL-MCNC: 5.17 MG/DL (ref 0–0.5)
D-LACTATE SERPL-SCNC: 1 MMOL/L (ref 0.5–2)
DEPRECATED RDW RBC AUTO: 49.3 FL (ref 37–54)
EGFRCR SERPLBLD CKD-EPI 2021: 34.1 ML/MIN/1.73
EGFRCR SERPLBLD CKD-EPI 2021: 38.4 ML/MIN/1.73
EOSINOPHIL # BLD AUTO: 0.26 10*3/MM3 (ref 0–0.4)
EOSINOPHIL NFR BLD AUTO: 2.5 % (ref 0.3–6.2)
ERYTHROCYTE [DISTWIDTH] IN BLOOD BY AUTOMATED COUNT: 13.5 % (ref 12.3–15.4)
GLOBULIN UR ELPH-MCNC: 3 GM/DL
GLUCOSE SERPL-MCNC: 120 MG/DL (ref 65–99)
GLUCOSE SERPL-MCNC: 91 MG/DL (ref 65–99)
GLUCOSE UR STRIP-MCNC: ABNORMAL MG/DL
HCT VFR BLD AUTO: 44.4 % (ref 34–46.6)
HGB BLD-MCNC: 14.6 G/DL (ref 12–15.9)
HGB UR QL STRIP.AUTO: ABNORMAL
HOLD SPECIMEN: NORMAL
HYALINE CASTS UR QL AUTO: ABNORMAL /LPF
IMM GRANULOCYTES # BLD AUTO: 0.05 10*3/MM3 (ref 0–0.05)
IMM GRANULOCYTES NFR BLD AUTO: 0.5 % (ref 0–0.5)
KETONES UR QL STRIP: NEGATIVE
LEUKOCYTE ESTERASE UR QL STRIP.AUTO: ABNORMAL
LIPASE SERPL-CCNC: 27 U/L (ref 13–60)
LYMPHOCYTES # BLD AUTO: 3.09 10*3/MM3 (ref 0.7–3.1)
LYMPHOCYTES NFR BLD AUTO: 29.9 % (ref 19.6–45.3)
MCH RBC QN AUTO: 32.5 PG (ref 26.6–33)
MCHC RBC AUTO-ENTMCNC: 32.9 G/DL (ref 31.5–35.7)
MCV RBC AUTO: 98.9 FL (ref 79–97)
MONOCYTES # BLD AUTO: 0.91 10*3/MM3 (ref 0.1–0.9)
MONOCYTES NFR BLD AUTO: 8.8 % (ref 5–12)
NEUTROPHILS NFR BLD AUTO: 5.94 10*3/MM3 (ref 1.7–7)
NEUTROPHILS NFR BLD AUTO: 57.6 % (ref 42.7–76)
NITRITE UR QL STRIP: POSITIVE
NRBC BLD AUTO-RTO: 0 /100 WBC (ref 0–0.2)
PH UR STRIP.AUTO: 5.5 [PH] (ref 5–8)
PLATELET # BLD AUTO: 205 10*3/MM3 (ref 140–450)
PMV BLD AUTO: 9.1 FL (ref 6–12)
POTASSIUM SERPL-SCNC: 3.2 MMOL/L (ref 3.5–5.2)
POTASSIUM SERPL-SCNC: 3.9 MMOL/L (ref 3.5–5.2)
PROT SERPL-MCNC: 6.5 G/DL (ref 6–8.5)
PROT UR QL STRIP: ABNORMAL
RBC # BLD AUTO: 4.49 10*6/MM3 (ref 3.77–5.28)
RBC # UR STRIP: ABNORMAL /HPF
REF LAB TEST METHOD: ABNORMAL
SODIUM SERPL-SCNC: 129 MMOL/L (ref 136–145)
SODIUM SERPL-SCNC: 137 MMOL/L (ref 136–145)
SP GR UR STRIP: 1.02 (ref 1–1.03)
SQUAMOUS #/AREA URNS HPF: ABNORMAL /HPF
UROBILINOGEN UR QL STRIP: ABNORMAL
WBC # UR STRIP: ABNORMAL /HPF
WBC NRBC COR # BLD AUTO: 10.32 10*3/MM3 (ref 3.4–10.8)
WHOLE BLOOD HOLD COAG: NORMAL
WHOLE BLOOD HOLD SPECIMEN: NORMAL

## 2024-09-30 PROCEDURE — 96365 THER/PROPH/DIAG IV INF INIT: CPT

## 2024-09-30 PROCEDURE — 83605 ASSAY OF LACTIC ACID: CPT | Performed by: EMERGENCY MEDICINE

## 2024-09-30 PROCEDURE — 74176 CT ABD & PELVIS W/O CONTRAST: CPT

## 2024-09-30 PROCEDURE — 87040 BLOOD CULTURE FOR BACTERIA: CPT | Performed by: PHYSICIAN ASSISTANT

## 2024-09-30 PROCEDURE — 81001 URINALYSIS AUTO W/SCOPE: CPT | Performed by: EMERGENCY MEDICINE

## 2024-09-30 PROCEDURE — 87186 SC STD MICRODIL/AGAR DIL: CPT | Performed by: PHYSICIAN ASSISTANT

## 2024-09-30 PROCEDURE — 83690 ASSAY OF LIPASE: CPT | Performed by: EMERGENCY MEDICINE

## 2024-09-30 PROCEDURE — 74176 CT ABD & PELVIS W/O CONTRAST: CPT | Performed by: RADIOLOGY

## 2024-09-30 PROCEDURE — 85025 COMPLETE CBC W/AUTO DIFF WBC: CPT | Performed by: EMERGENCY MEDICINE

## 2024-09-30 PROCEDURE — 86140 C-REACTIVE PROTEIN: CPT | Performed by: PHYSICIAN ASSISTANT

## 2024-09-30 PROCEDURE — 36415 COLL VENOUS BLD VENIPUNCTURE: CPT | Performed by: EMERGENCY MEDICINE

## 2024-09-30 PROCEDURE — 80053 COMPREHEN METABOLIC PANEL: CPT | Performed by: EMERGENCY MEDICINE

## 2024-09-30 PROCEDURE — 25810000003 SODIUM CHLORIDE 0.9 % SOLUTION: Performed by: PHYSICIAN ASSISTANT

## 2024-09-30 PROCEDURE — 99284 EMERGENCY DEPT VISIT MOD MDM: CPT

## 2024-09-30 PROCEDURE — 87077 CULTURE AEROBIC IDENTIFY: CPT | Performed by: PHYSICIAN ASSISTANT

## 2024-09-30 PROCEDURE — 25010000002 CEFTRIAXONE PER 250 MG: Performed by: PHYSICIAN ASSISTANT

## 2024-09-30 PROCEDURE — 87086 URINE CULTURE/COLONY COUNT: CPT | Performed by: PHYSICIAN ASSISTANT

## 2024-09-30 RX ORDER — LISINOPRIL 5 MG/1
5 TABLET ORAL DAILY
COMMUNITY

## 2024-09-30 RX ORDER — METOPROLOL TARTRATE 25 MG/1
25 TABLET, FILM COATED ORAL 2 TIMES DAILY
COMMUNITY

## 2024-09-30 RX ORDER — SODIUM CHLORIDE 0.9 % (FLUSH) 0.9 %
10 SYRINGE (ML) INJECTION AS NEEDED
Status: DISCONTINUED | OUTPATIENT
Start: 2024-09-30 | End: 2024-10-01 | Stop reason: HOSPADM

## 2024-09-30 RX ORDER — POTASSIUM CHLORIDE 1500 MG/1
40 TABLET, EXTENDED RELEASE ORAL ONCE
Status: COMPLETED | OUTPATIENT
Start: 2024-09-30 | End: 2024-09-30

## 2024-09-30 RX ORDER — CLOTRIMAZOLE 1 %
1 CREAM (GRAM) TOPICAL 2 TIMES DAILY
COMMUNITY

## 2024-09-30 RX ADMIN — SODIUM CHLORIDE 2000 MG: 9 INJECTION, SOLUTION INTRAVENOUS at 19:22

## 2024-09-30 RX ADMIN — POTASSIUM CHLORIDE 40 MEQ: 1500 TABLET, EXTENDED RELEASE ORAL at 19:22

## 2024-09-30 RX ADMIN — SODIUM CHLORIDE 1000 ML: 9 INJECTION, SOLUTION INTRAVENOUS at 19:23

## 2024-09-30 NOTE — ED PROVIDER NOTES
"Subjective   History of Present Illness  78-year-old female who presents to the ED today for \"kidney trouble.\"  She states this has been going on for 2 weeks.  She is having bilateral flank pain, dysuria, urinary urgency and frequency.  She states she has been on cephalexin 2 times as well as some Diflucan.  She states she finished her most recent course of Diflucan last week but her symptoms are not improving.  She has now had some difficulty urinating for the last 6 hours.  She reports that she can only dribble when she urinates.  She also reports that she has a rash in the vaginal area.  She has been using an over-the-counter cream for it.  Patient has a history of breast cancer, CHF, CKD, coronary artery disease with 3 coronary artery stents, DVT, anxiety and depression, thyroid disease, GERD, glaucoma, hypertension, hyperlipidemia, migraines, peptic ulcer disease, peripheral arterial disease, sick sinus syndrome status post pacemaker placement.    History provided by:  Patient  Difficulty Urinating  Pain severity:  Moderate  Onset quality:  Gradual  Duration:  2 weeks  Timing:  Constant  Progression:  Worsening  Chronicity:  New  Relieved by:  Nothing  Worsened by:  Nothing  Urinary symptoms: frequent urination and hesitancy    Associated symptoms: flank pain    Associated symptoms: no abdominal pain, no fever, no nausea and no vomiting    Risk factors: renal disease        Review of Systems   Constitutional: Negative.  Negative for fever.   HENT: Negative.     Eyes: Negative.    Respiratory: Negative.     Cardiovascular: Negative.    Gastrointestinal:  Negative for abdominal pain, nausea and vomiting.   Genitourinary:  Positive for difficulty urinating, dysuria, flank pain, frequency and urgency.   Skin:  Positive for rash.   Neurological: Negative.    Psychiatric/Behavioral: Negative.     All other systems reviewed and are negative.      Past Medical History:   Diagnosis Date    Anxiety     Breast cancer " 11/28/2017    bilateral    CHF (congestive heart failure)     CKD (chronic kidney disease)     Coronary artery disease     3 HEART STENTS    Deep vein thrombosis     right lower extremity    Depression     Disease of thyroid gland     Gastroesophageal reflux     Glaucoma     Hyperlipidemia     Hypertension     MI (myocardial infarction)     Migraine     Peptic ulcer disease     Peripheral arterial disease     KIM LEG STENTS       ANDFEM POP    Peripheral vascular disease     SSS (sick sinus syndrome)     Status post placement of cardiac pacemaker     Wears dentures     Wears glasses        Allergies   Allergen Reactions    Promethazine      hypotension       Past Surgical History:   Procedure Laterality Date    BREAST LUMPECTOMY Right 12/12/2017    Procedure: BREAST LUMPECTOMY WITH NEEDLE LOCALIZATION;  Surgeon: Lida Babin MD;  Location: [facility];  Service:     BREAST LUMPECTOMY Bilateral 12/27/2017    Procedure: right breast re-excision  left breast re-excision with 3 cm inferior and superior advancement flap;  Surgeon: Lida Babin MD;  Location: [facility];  Service:     BREAST LUMPECTOMY WITH SENTINEL NODE BIOPSY AND AXILLARY NODE DISSECTION Left 12/12/2017    Procedure: Left BREAST LUMPECTOMY WITH SENTINEL NODE BIOPSY  Isotope and Lymphazurin injection left breast. Left breast preoperative needle localization;  Surgeon: Lida Babin MD;  Location: [facility];  Service:     BREAST SURGERY      Mass Removal.    CARDIAC CATHETERIZATION      CARDIAC CATHETERIZATION N/A 06/26/2019    Procedure: Left Heart Cath;  Surgeon: Salvador Mcacrthy MD;  Location: [facility] CATH INVASIVE LOCATION;  Service: Cardiology    CARDIAC ELECTROPHYSIOLOGY PROCEDURE Left 12/16/2020    Procedure: PPM generator change - dual;  Surgeon: Salvador Mccarthy MD;  Location: [facility] CATH INVASIVE LOCATION;  Service: Cardiology;  Laterality: Left;    CARPAL TUNNEL RELEASE      CATARACT EXTRACTION Bilateral     ESOPHAGUS SURGERY       FEMORAL DISTAL BYPASS      FEMORAL FEMORAL BYPASS      HIP SURGERY Right     HYSTERECTOMY      benign    PACEMAKER IMPLANTATION      CO RT/LT HEART CATHETERS N/A 06/26/2019    Procedure: Percutaneous Coronary Intervention;  Surgeon: Gus Suero MD;  Location: [facility] CATH INVASIVE LOCATION;  Service: Cardiology    SHOULDER ARTHROSCOPY W/ ROTATOR CUFF REPAIR Left 03/08/2017    Procedure: LEFT SHOULDER ARTHROSCOPY, ACROMIOPLASTY, SUBACROMIC DEBRIDMENT;  Surgeon: Tesfaye Polo MD;  Location: [facility] OR;  Service:     SHOULDER SURGERY         Family History   Problem Relation Age of Onset    Asthma Father     Cancer Brother     No Known Problems Mother     Breast cancer Neg Hx        Social History     Socioeconomic History    Marital status:     Number of children: 4   Tobacco Use    Smoking status: Every Day     Current packs/day: 1.00     Average packs/day: 1 pack/day for 63.7 years (63.7 ttl pk-yrs)     Types: Cigarettes     Start date: 1961    Smokeless tobacco: Never   Vaping Use    Vaping status: Never Used   Substance and Sexual Activity    Alcohol use: No    Drug use: No    Sexual activity: Defer           Objective   Physical Exam  Vitals and nursing note reviewed.   Constitutional:       General: She is not in acute distress.     Appearance: Normal appearance. She is not diaphoretic.   HENT:      Head: Normocephalic and atraumatic.      Right Ear: External ear normal.      Left Ear: External ear normal.      Nose: Nose normal.   Eyes:      Conjunctiva/sclera: Conjunctivae normal.      Pupils: Pupils are equal, round, and reactive to light.   Cardiovascular:      Rate and Rhythm: Normal rate and regular rhythm.      Pulses: Normal pulses.      Heart sounds: Normal heart sounds.   Pulmonary:      Effort: Pulmonary effort is normal.      Breath sounds: Normal breath sounds.   Abdominal:      General: Bowel sounds are normal.      Palpations: Abdomen is soft.      Tenderness: There is no  abdominal tenderness. There is no right CVA tenderness, left CVA tenderness, guarding or rebound.   Genitourinary:     Comments: No rash or discharge noted in the vaginal area.  Musculoskeletal:         General: Normal range of motion.      Cervical back: Normal range of motion and neck supple.   Skin:     General: Skin is warm and dry.      Capillary Refill: Capillary refill takes less than 2 seconds.   Neurological:      General: No focal deficit present.      Mental Status: She is alert and oriented to person, place, and time.   Psychiatric:         Mood and Affect: Mood normal.         Procedures       Results for orders placed or performed during the hospital encounter of 09/30/24   Comprehensive Metabolic Panel    Specimen: Blood   Result Value Ref Range    Glucose 120 (H) 65 - 99 mg/dL    BUN 16 8 - 23 mg/dL    Creatinine 1.57 (H) 0.57 - 1.00 mg/dL    Sodium 129 (L) 136 - 145 mmol/L    Potassium 3.2 (L) 3.5 - 5.2 mmol/L    Chloride 100 98 - 107 mmol/L    CO2 20.0 (L) 22.0 - 29.0 mmol/L    Calcium 9.0 8.6 - 10.5 mg/dL    Total Protein 6.5 6.0 - 8.5 g/dL    Albumin 3.5 3.5 - 5.2 g/dL    ALT (SGPT) 7 1 - 33 U/L    AST (SGOT) 14 1 - 32 U/L    Alkaline Phosphatase 73 39 - 117 U/L    Total Bilirubin 0.2 0.0 - 1.2 mg/dL    Globulin 3.0 gm/dL    A/G Ratio 1.2 g/dL    BUN/Creatinine Ratio 10.2 7.0 - 25.0    Anion Gap 9.0 5.0 - 15.0 mmol/L    eGFR 34.1 (L) >60.0 mL/min/1.73   Lipase    Specimen: Blood   Result Value Ref Range    Lipase 27 13 - 60 U/L   Urinalysis With Microscopic If Indicated (No Culture) - Urine, Clean Catch    Specimen: Urine, Clean Catch   Result Value Ref Range    Color, UA Yellow Yellow, Straw    Appearance, UA Turbid (A) Clear    pH, UA 5.5 5.0 - 8.0    Specific Gravity, UA 1.018 1.005 - 1.030    Glucose, UA >=1000 mg/dL (3+) (A) Negative    Ketones, UA Negative Negative    Bilirubin, UA Negative Negative    Blood, UA Moderate (2+) (A) Negative    Protein,  mg/dL (2+) (A) Negative    Leuk  Esterase, UA Moderate (2+) (A) Negative    Nitrite, UA Positive (A) Negative    Urobilinogen, UA 0.2 E.U./dL 0.2 - 1.0 E.U./dL   Lactic Acid, Plasma    Specimen: Blood   Result Value Ref Range    Lactate 1.0 0.5 - 2.0 mmol/L   CBC Auto Differential    Specimen: Blood   Result Value Ref Range    WBC 10.32 3.40 - 10.80 10*3/mm3    RBC 4.49 3.77 - 5.28 10*6/mm3    Hemoglobin 14.6 12.0 - 15.9 g/dL    Hematocrit 44.4 34.0 - 46.6 %    MCV 98.9 (H) 79.0 - 97.0 fL    MCH 32.5 26.6 - 33.0 pg    MCHC 32.9 31.5 - 35.7 g/dL    RDW 13.5 12.3 - 15.4 %    RDW-SD 49.3 37.0 - 54.0 fl    MPV 9.1 6.0 - 12.0 fL    Platelets 205 140 - 450 10*3/mm3    Neutrophil % 57.6 42.7 - 76.0 %    Lymphocyte % 29.9 19.6 - 45.3 %    Monocyte % 8.8 5.0 - 12.0 %    Eosinophil % 2.5 0.3 - 6.2 %    Basophil % 0.7 0.0 - 1.5 %    Immature Grans % 0.5 0.0 - 0.5 %    Neutrophils, Absolute 5.94 1.70 - 7.00 10*3/mm3    Lymphocytes, Absolute 3.09 0.70 - 3.10 10*3/mm3    Monocytes, Absolute 0.91 (H) 0.10 - 0.90 10*3/mm3    Eosinophils, Absolute 0.26 0.00 - 0.40 10*3/mm3    Basophils, Absolute 0.07 0.00 - 0.20 10*3/mm3    Immature Grans, Absolute 0.05 0.00 - 0.05 10*3/mm3    nRBC 0.0 0.0 - 0.2 /100 WBC   Urinalysis, Microscopic Only - Urine, Clean Catch    Specimen: Urine, Clean Catch   Result Value Ref Range    RBC, UA 3-5 (A) None Seen, 0-2 /HPF    WBC, UA Too Numerous to Count (A) None Seen, 0-2 /HPF    Bacteria, UA 1+ (A) None Seen /HPF    Squamous Epithelial Cells, UA 0-2 None Seen, 0-2 /HPF    Hyaline Casts, UA None Seen None Seen /LPF    Methodology Automated Microscopy    [place] Urine Culture Tube - Urine, Clean Catch    Specimen: Urine, Clean Catch   Result Value Ref Range    Extra Tube Hold for add-ons.    C-reactive Protein    Specimen: Blood   Result Value Ref Range    C-Reactive Protein 5.17 (H) 0.00 - 0.50 mg/dL   Basic Metabolic Panel    Specimen: Blood   Result Value Ref Range    Glucose 91 65 - 99 mg/dL    BUN 14 8 - 23 mg/dL    Creatinine 1.42  (H) 0.57 - 1.00 mg/dL    Sodium 137 136 - 145 mmol/L    Potassium 3.9 3.5 - 5.2 mmol/L    Chloride 107 98 - 107 mmol/L    CO2 17.7 (L) 22.0 - 29.0 mmol/L    Calcium 8.7 8.6 - 10.5 mg/dL    BUN/Creatinine Ratio 9.9 7.0 - 25.0    Anion Gap 12.3 5.0 - 15.0 mmol/L    eGFR 38.4 (L) >60.0 mL/min/1.73   Green Top (Gel)   Result Value Ref Range    Extra Tube Hold for add-ons.    Lavender Top   Result Value Ref Range    Extra Tube hold for add-on    Gold Top - SST   Result Value Ref Range    Extra Tube Hold for add-ons.    Light Blue Top   Result Value Ref Range    Extra Tube Hold for add-ons.           ED Course  ED Course as of 10/01/24 0011   Mon Sep 30, 2024   1911 Bladder scan shows 0 mL in the bladder at this time. []   1911 Renal function on 7/27/24 was 11 and 1.09 with a GFR of 53. []   1948 Endorsed to Dr. Lr []      ED Course User Index  [] Stacy Rubio, PA                                             Medical Decision Making  I assumed care of this patient at shift change from the midlevel involved.  Laboratory workup as listed.  Hypokalemia and hyponatremia noted.  Electrolytes resolved with IV fluids and repeat BMP findings.  However, decreased bicarbonate levels noted.  CT imaging of the abdomen pelvis noted a 3.4 cm AAA abdominal aortic aneurysm.  This was discussed with the patient's family.  No concerns for bleeding.  No other acute abnormalities noted.  Urinalysis still concerning for UTI.  Patient will begin oral antibiotic therapy.    Given the patient's decreasing bicarbonate level, I discussed these findings with the hospitalist team on-call.  They noted to offered admission to the patient.  However the, the patient wished to be discharged home for close outpatient follow-up with their PCP.  Myself and the hospitalist team noted that the patient should closely follow-up with her PCP and have repeat laboratory workup completed in the next 24 to 48 hours.  Patient agreeable and comfortable with  plan.    Work up and results were discussed throughly with the patient.  The patient will be discharged for further monitoring and management with their PCP.  Red flags, warning signs, worsening symptoms, and when to return to the ER discussed with and understood by the patient.  Patient will follow up with their PCP in a timely manner.  Vitals stable at discharge.    Amount and/or Complexity of Data Reviewed  Labs: ordered. Decision-making details documented in ED Course.  Radiology: ordered. Decision-making details documented in ED Course.    Risk  Prescription drug management.        Final diagnoses:   Acute UTI   Electronically signed by YESI Johns, 09/30/24, 7:16 PM EDT.     ED Disposition  ED Disposition       ED Disposition   Discharge    Condition   Stable    Comment   --               Lencho Gutierrez MD  [address] [place] [address] [address] [place] [address] [phone]    In 1 day      [facility] EMERGENCY DEPARTMENT  [address] [place] [id number] [phone]    If symptoms worsen         Medication List        New Prescriptions      cefuroxime 500 MG tablet  Commonly known as: CEFTIN  Take 1 tablet by mouth 2 (Two) Times a Day.               Where to Get Your Medications        These medications were sent to [facility] [place] - [address] - [phone] [facility] [phone]   [address], [place] [address]      Phone: [phone]   cefuroxime 500 MG tablet            Wing Lr DO  10/01/24 0011

## 2024-09-30 NOTE — ED TRIAGE NOTES
MEDICAL SCREENING:    Reason for Visit: UTI    Patient initially seen in triage.  The patient was advised further evaluation and diagnostic testing will be needed, some of the treatment and testing will be initiated in the lobby in order to begin the process.  The patient will be returned to the waiting area for the time being and possibly be re-assessed by a subsequent ED provider.  The patient will be brought back to the treatment area in as timely manner as possible.

## 2024-09-30 NOTE — CASE MANAGEMENT/SOCIAL WORK
Discharge Planning Assessment   Ethan     Patient Name: Sirisha Valdez  MRN: [id number]  Today's Date: 9/30/2024    Admit Date: 9/30/2024    Plan: Spoke with patient at bedside. Patient lives alone and will return home at discharge. Patient has no POA or Living Will. Patient uses no DME, Oxygen or HH. Patients PCP Lencho Gutierrez and pharmacy [facility] [place], [place]. Patients family will transport home at discharge.   Discharge Needs Assessment       Row Name 09/30/24 1943       Living Environment    People in Home alone    Potentially Unsafe Housing Conditions none    In the past 12 months has the electric, gas, oil, or water company threatened to shut off services in your home? No    Primary Care Provided by self    Provides Primary Care For no one    Family Caregiver if Needed child(david), adult    Family Caregiver Names Denisa Perez Daughter [phone]    Quality of Family Relationships helpful;involved;supportive    Able to Return to Prior Arrangements yes       Resource/Environmental Concerns    Resource/Environmental Concerns none    Transportation Concerns none       Transportation Needs    In the past 12 months, has lack of transportation kept you from medical appointments or from getting medications? no    In the past 12 months, has lack of transportation kept you from meetings, work, or from getting things needed for daily living? No       Food Insecurity    Within the past 12 months, you worried that your food would run out before you got the money to buy more. Never true    Within the past 12 months, the food you bought just didn't last and you didn't have money to get more. Never true       Transition Planning    Patient/Family Anticipates Transition to home    Patient/Family Anticipated Services at Transition none    Transportation Anticipated family or friend will provide       Discharge Needs Assessment    Readmission Within the Last 30 Days no previous admission in last 30 days     Equipment Currently Used at Home none    Concerns to be Addressed discharge planning    Anticipated Changes Related to Illness none    Equipment Needed After Discharge none                   Discharge Plan       Row Name 09/30/24 0690       Plan    Plan Spoke with patient at bedside. Patient lives alone and will return home at discharge. Patient has no POA or Living Will. Patient uses no DME, Oxygen or HH. Patients PCP Lencho Gutierrez and pharmacy [facility] [place], [place] Patients family will transport home at discharge.    Patient/Family in Agreement with Plan yes             Kelley Huffman

## 2024-10-01 VITALS
BODY MASS INDEX: 18.95 KG/M2 | RESPIRATION RATE: 18 BRPM | TEMPERATURE: 97.3 F | OXYGEN SATURATION: 94 % | DIASTOLIC BLOOD PRESSURE: 51 MMHG | WEIGHT: 111 LBS | SYSTOLIC BLOOD PRESSURE: 110 MMHG | HEIGHT: 64 IN | HEART RATE: 87 BPM

## 2024-10-01 RX ORDER — CEFUROXIME AXETIL 500 MG/1
500 TABLET ORAL 2 TIMES DAILY
Qty: 13 TABLET | Refills: 0 | Status: SHIPPED | OUTPATIENT
Start: 2024-10-01

## 2024-10-02 LAB — BACTERIA SPEC AEROBE CULT: ABNORMAL

## 2024-10-05 LAB
BACTERIA SPEC AEROBE CULT: NORMAL
BACTERIA SPEC AEROBE CULT: NORMAL

## 2024-12-01 ENCOUNTER — APPOINTMENT (OUTPATIENT)
Dept: CT IMAGING | Facility: HOSPITAL | Age: 79
End: 2024-12-01
Payer: MEDICARE

## 2024-12-01 ENCOUNTER — HOSPITAL ENCOUNTER (EMERGENCY)
Facility: HOSPITAL | Age: 79
Discharge: HOME | End: 2024-12-02
Payer: MEDICARE

## 2024-12-01 ENCOUNTER — APPOINTMENT (OUTPATIENT)
Dept: GENERAL RADIOLOGY | Facility: HOSPITAL | Age: 79
End: 2024-12-01
Payer: MEDICARE

## 2024-12-01 DIAGNOSIS — R41.0 DELIRIUM: Primary | ICD-10-CM

## 2024-12-01 LAB
ALBUMIN SERPL-MCNC: 2.9 G/DL (ref 3.5–5.2)
ALBUMIN/GLOB SERPL: 1 G/DL
ALP SERPL-CCNC: 57 U/L (ref 39–117)
ALT SERPL W P-5'-P-CCNC: 11 U/L (ref 1–33)
ANION GAP SERPL CALCULATED.3IONS-SCNC: 12.8 MMOL/L (ref 5–15)
APTT PPP: 22.3 SECONDS (ref 26.5–34.5)
AST SERPL-CCNC: 29 U/L (ref 1–32)
BASOPHILS # BLD AUTO: 0.07 10*3/MM3 (ref 0–0.2)
BASOPHILS NFR BLD AUTO: 0.6 % (ref 0–1.5)
BILIRUB SERPL-MCNC: 0.5 MG/DL (ref 0–1.2)
BUN SERPL-MCNC: 26 MG/DL (ref 8–23)
BUN/CREAT SERPL: 15.4 (ref 7–25)
CALCIUM SPEC-SCNC: 9 MG/DL (ref 8.6–10.5)
CHLORIDE SERPL-SCNC: 109 MMOL/L (ref 98–107)
CO2 SERPL-SCNC: 19.2 MMOL/L (ref 22–29)
CREAT SERPL-MCNC: 1.69 MG/DL (ref 0.57–1)
D-LACTATE SERPL-SCNC: 1.4 MMOL/L (ref 0.5–2)
DEPRECATED RDW RBC AUTO: 51.9 FL (ref 37–54)
EGFRCR SERPLBLD CKD-EPI 2021: 31 ML/MIN/1.73
EOSINOPHIL # BLD AUTO: 0.05 10*3/MM3 (ref 0–0.4)
EOSINOPHIL NFR BLD AUTO: 0.4 % (ref 0.3–6.2)
ERYTHROCYTE [DISTWIDTH] IN BLOOD BY AUTOMATED COUNT: 14.4 % (ref 12.3–15.4)
GLOBULIN UR ELPH-MCNC: 2.9 GM/DL
GLUCOSE SERPL-MCNC: 134 MG/DL (ref 65–99)
HCT VFR BLD AUTO: 44.3 % (ref 34–46.6)
HGB BLD-MCNC: 14.2 G/DL (ref 12–15.9)
HOLD SPECIMEN: NORMAL
HOLD SPECIMEN: NORMAL
IMM GRANULOCYTES # BLD AUTO: 0.04 10*3/MM3 (ref 0–0.05)
IMM GRANULOCYTES NFR BLD AUTO: 0.3 % (ref 0–0.5)
INR PPP: 1.09 (ref 0.9–1.1)
LYMPHOCYTES # BLD AUTO: 2.8 10*3/MM3 (ref 0.7–3.1)
LYMPHOCYTES NFR BLD AUTO: 22.1 % (ref 19.6–45.3)
MCH RBC QN AUTO: 31.6 PG (ref 26.6–33)
MCHC RBC AUTO-ENTMCNC: 32.1 G/DL (ref 31.5–35.7)
MCV RBC AUTO: 98.4 FL (ref 79–97)
MONOCYTES # BLD AUTO: 1.19 10*3/MM3 (ref 0.1–0.9)
MONOCYTES NFR BLD AUTO: 9.4 % (ref 5–12)
NEUTROPHILS NFR BLD AUTO: 67.2 % (ref 42.7–76)
NEUTROPHILS NFR BLD AUTO: 8.54 10*3/MM3 (ref 1.7–7)
NRBC BLD AUTO-RTO: 0 /100 WBC (ref 0–0.2)
PLATELET # BLD AUTO: 205 10*3/MM3 (ref 140–450)
PMV BLD AUTO: 10.3 FL (ref 6–12)
POTASSIUM SERPL-SCNC: 3.2 MMOL/L (ref 3.5–5.2)
PROCALCITONIN SERPL-MCNC: 1.19 NG/ML (ref 0–0.25)
PROT SERPL-MCNC: 5.8 G/DL (ref 6–8.5)
PROTHROMBIN TIME: 14.2 SECONDS (ref 12.1–14.7)
RBC # BLD AUTO: 4.5 10*6/MM3 (ref 3.77–5.28)
SODIUM SERPL-SCNC: 141 MMOL/L (ref 136–145)
TROPONIN T SERPL HS-MCNC: 31 NG/L
WBC NRBC COR # BLD AUTO: 12.69 10*3/MM3 (ref 3.4–10.8)
WHOLE BLOOD HOLD COAG: NORMAL
WHOLE BLOOD HOLD SPECIMEN: NORMAL

## 2024-12-01 PROCEDURE — 72131 CT LUMBAR SPINE W/O DYE: CPT | Performed by: RADIOLOGY

## 2024-12-01 PROCEDURE — P9612 CATHETERIZE FOR URINE SPEC: HCPCS

## 2024-12-01 PROCEDURE — 81001 URINALYSIS AUTO W/SCOPE: CPT

## 2024-12-01 PROCEDURE — 80053 COMPREHEN METABOLIC PANEL: CPT

## 2024-12-01 PROCEDURE — 85730 THROMBOPLASTIN TIME PARTIAL: CPT

## 2024-12-01 PROCEDURE — 70450 CT HEAD/BRAIN W/O DYE: CPT | Performed by: RADIOLOGY

## 2024-12-01 PROCEDURE — 87040 BLOOD CULTURE FOR BACTERIA: CPT

## 2024-12-01 PROCEDURE — 25810000003 SODIUM CHLORIDE 0.9 % SOLUTION

## 2024-12-01 PROCEDURE — 72128 CT CHEST SPINE W/O DYE: CPT | Performed by: RADIOLOGY

## 2024-12-01 PROCEDURE — 93010 ELECTROCARDIOGRAM REPORT: CPT | Performed by: INTERNAL MEDICINE

## 2024-12-01 PROCEDURE — 84484 ASSAY OF TROPONIN QUANT: CPT

## 2024-12-01 PROCEDURE — 36415 COLL VENOUS BLD VENIPUNCTURE: CPT

## 2024-12-01 PROCEDURE — 71045 X-RAY EXAM CHEST 1 VIEW: CPT | Performed by: RADIOLOGY

## 2024-12-01 PROCEDURE — 72125 CT NECK SPINE W/O DYE: CPT | Performed by: RADIOLOGY

## 2024-12-01 PROCEDURE — 71250 CT THORAX DX C-: CPT | Performed by: RADIOLOGY

## 2024-12-01 PROCEDURE — 83605 ASSAY OF LACTIC ACID: CPT

## 2024-12-01 PROCEDURE — 72128 CT CHEST SPINE W/O DYE: CPT

## 2024-12-01 PROCEDURE — 70450 CT HEAD/BRAIN W/O DYE: CPT

## 2024-12-01 PROCEDURE — 71250 CT THORAX DX C-: CPT

## 2024-12-01 PROCEDURE — 84145 PROCALCITONIN (PCT): CPT

## 2024-12-01 PROCEDURE — 93005 ELECTROCARDIOGRAM TRACING: CPT

## 2024-12-01 PROCEDURE — 99284 EMERGENCY DEPT VISIT MOD MDM: CPT

## 2024-12-01 PROCEDURE — 74176 CT ABD & PELVIS W/O CONTRAST: CPT | Performed by: RADIOLOGY

## 2024-12-01 PROCEDURE — 72131 CT LUMBAR SPINE W/O DYE: CPT

## 2024-12-01 PROCEDURE — 85025 COMPLETE CBC W/AUTO DIFF WBC: CPT

## 2024-12-01 PROCEDURE — 71045 X-RAY EXAM CHEST 1 VIEW: CPT

## 2024-12-01 PROCEDURE — 74176 CT ABD & PELVIS W/O CONTRAST: CPT

## 2024-12-01 PROCEDURE — 85610 PROTHROMBIN TIME: CPT

## 2024-12-01 PROCEDURE — 72125 CT NECK SPINE W/O DYE: CPT

## 2024-12-01 RX ORDER — SODIUM CHLORIDE 0.9 % (FLUSH) 0.9 %
10 SYRINGE (ML) INJECTION AS NEEDED
Status: DISCONTINUED | OUTPATIENT
Start: 2024-12-01 | End: 2024-12-02 | Stop reason: HOSPADM

## 2024-12-01 RX ADMIN — SODIUM CHLORIDE 1000 ML: 9 INJECTION, SOLUTION INTRAVENOUS at 20:20

## 2024-12-02 VITALS
RESPIRATION RATE: 16 BRPM | OXYGEN SATURATION: 92 % | DIASTOLIC BLOOD PRESSURE: 49 MMHG | HEIGHT: 64 IN | BODY MASS INDEX: 18.27 KG/M2 | TEMPERATURE: 97.7 F | SYSTOLIC BLOOD PRESSURE: 103 MMHG | WEIGHT: 107 LBS | HEART RATE: 72 BPM

## 2024-12-02 LAB
AMORPH URATE CRY URNS QL MICRO: ABNORMAL /HPF
BACTERIA UR QL AUTO: ABNORMAL /HPF
BILIRUB UR QL STRIP: NEGATIVE
CLARITY UR: ABNORMAL
COLOR UR: ABNORMAL
GEN 5 1HR TROPONIN T REFLEX: 30 NG/L
GLUCOSE UR STRIP-MCNC: NEGATIVE MG/DL
HGB UR QL STRIP.AUTO: ABNORMAL
HOLD SPECIMEN: NORMAL
HYALINE CASTS UR QL AUTO: ABNORMAL /LPF
KETONES UR QL STRIP: NEGATIVE
LEUKOCYTE ESTERASE UR QL STRIP.AUTO: ABNORMAL
NITRITE UR QL STRIP: NEGATIVE
PH UR STRIP.AUTO: 5.5 [PH] (ref 5–8)
PROT UR QL STRIP: ABNORMAL
QT INTERVAL: 452 MS
QTC INTERVAL: 473 MS
RBC # UR STRIP: ABNORMAL /HPF
REF LAB TEST METHOD: ABNORMAL
SP GR UR STRIP: 1.02 (ref 1–1.03)
SQUAMOUS #/AREA URNS HPF: ABNORMAL /HPF
TROPONIN T DELTA: -1 NG/L
UROBILINOGEN UR QL STRIP: ABNORMAL
WBC # UR STRIP: ABNORMAL /HPF

## 2024-12-02 PROCEDURE — 84484 ASSAY OF TROPONIN QUANT: CPT

## 2024-12-02 NOTE — ED NOTES
Patient: Eric Hines Date: 2024   : 1960 Attending: Shawn Kebede MD   64 year old male Room: /A     Chief Complaint: Severe mitral valve regurgitation   Post-op Day #: 2    Surgical Procedure:   1.)  Complex mitral valve repair (P2 triangular resection, 32mm Physio II annuloplasty ring)  2.) Left atrial appendage occlusion (40mm Atriclip)    Subjective: surgical pain controlled    Vital Last Value 24 Hour Range   Temperature 98.5 °F (36.9 °C) (24 0400) Temp  Min: 97.9 °F (36.6 °C)  Max: 98.5 °F (36.9 °C)   Pulse 66 (24 0500) Pulse  Min: 49  Max: 125   Respiratory Rate (!) 25 (24 0500) Resp  Min: 25  Max: 42   Non-Invasive   Blood Pressure 133/73 (24 0500) BP  Min: 107/55  Max: 176/77   Arterial  Blood Pressure 124/79 (24 1000) No data recorded   Pulse Oximetry 95 % (24 0500) SpO2  Min: 88 %  Max: 97 %     Oxygen: 2L oxymask      Weight over the past 48 Hours:   Patient Vitals for the past 48 hrs:   Weight   24 0332 (!) 149.6 kg (329 lb 12.9 oz)   24 0600 (!) 147.1 kg (324 lb 4.8 oz)      Admit Weight:   Weight: (!) 148.7 kg (327 lb 14.4 oz) (24 0450)  BMI:   BMI (Calculated): 42.1 (24 0450)    Intake/Output:    Last Stool Occurrence: 1 (24 1400)    No intake/output data recorded.    I/O last 3 completed shifts:  In: 300 [P.O.:300]  Out: 2450 [Urine:2450]      Intake/Output Summary (Last 24 hours) at 2024 0713  Last data filed at 2024 0100  Gross per 24 hour   Intake 300 ml   Output 2450 ml   Net -2150 ml       Rhythm: Sinus rhythm and 1 AVB , SB down 40's overnight     Physical Exam:   Heart: Regular rate and rhythm, S1, S2 normal, no murmur, click, rub or gallop  Lungs: Diminished breath sounds  bibasilar  Abdomen: Soft, non-tender; bowel sounds normal  Incision(s): Sternal Wound VAC  Neurologic: Grossly normal  Extremities: warm, generalized edema    Laboratory Results:    Recent Labs   Lab 24  8428  Patient reports that she is finished.  Not enough urine was obtained for a urine specimen.     07/26/24  1855 07/26/24  1408 07/26/24  0933 07/26/24  0333 07/25/24  1407 07/25/24  0433 07/24/24  0409 07/23/24  1138   SODIUM 138  --   --   --  136  --  135  --   --    POTASSIUM 3.5 3.9 3.5   < > 3.3*   < > 3.3* 3.5 4.0   CHLORIDE 104  --   --   --  104  --  104  --   --    CO2 23  --   --   --  24  --  20*  --   --    BUN 27*  --   --   --  25*  --  25*  --   --    CREATININE 1.04  --   --   --  0.97  --  1.17  --   --    GLUCOSE 103*  --   --   --  115*  --  129*  --   --    MG  --   --   --   --  2.2  --  2.4 2.7* 2.9*   WBC 11.5*  --   --   --  11.4*  --  14.9* 12.1* 17.0*   HGB 9.6*  --   --   --  9.7*  --  9.8* 9.3* 12.1*   HCT 28.6*  --   --   --  27.9*  --  29.2* 27.1* 35.4*     --   --   --  117*  --  126* 140 167   PT 12.1*  --   --   --  12.7*  --   --  11.9* 13.1*   INR 1.2  --   --   --  1.2  --   --  1.1 1.3   PTT  --   --   --   --   --   --   --   --  28    < > = values in this interval not displayed.       [facility]    Recent Labs   Lab 07/25/24  0705   APH 7.49*   APO2 66*   AHCO3 20*   ASAT 95       MRSA & STAPH:   MRSA PCR (no units)   Date Value   07/16/2024 Not Detected     S Aureus PCR (no units)   Date Value   07/16/2024 Not Detected        Cultures: Reviewed    Chest X-Ray: Reviewed    Medications/Infusions:  Scheduled:    metoPROLOL tartrate (LOPRESSOR) tablet 12.5 mg Oral 2 times per day    ARIPiprazole (ABILIFY) tablet 5 mg Oral Daily    furosemide (LASIX INJECT) injection 40 mg Intravenous BID    amLODIPine (NORVASC) tablet 5 mg Oral Daily    metOLazone (ZAROXOLYN) tablet 2.5 mg Oral Daily    gabapentin (NEURONTIN) capsule 400 mg Oral 2 times per day    aspirin chewable 81 mg Oral Daily    atorvastatin (LIPITOR) tablet 40 mg Oral Daily    insulin lispro (ADMELOG,HumaLOG) - Correction Dose Subcutaneous TID WC    famotidine (PEPCID) tablet 20 mg Oral 2 times per day    buPROPion XL (WELLBUTRIN XL) tablet 300 mg Oral Daily    sertraline (ZOLOFT) tablet 200 mg Oral Daily    docusate  sodium-sennosides (SENOKOT S) 50-8.6 MG 2 tablet Oral Daily    sodium biphosphate (FLEET) enema Rectal Once    polyethylene glycol (MIRALAX) packet 17 g Oral BID       Continuous Infusions:   Current Facility-Administered Medications   Medication Dose Route Frequency Provider Last Rate Last Admin    dextrose 5 % / sodium chloride 0.45% with KCl 40 mEq infusion   Intravenous Continuous PRN Novicki, Patricia, PA-C   Stopped at 07/24/24 0900    dextrose 5 % / sodium chloride 0.45% infusion   Intravenous Continuous PRN Novicki, Patricia, PA-C        dextrose 5 % / sodium chloride 0.45% infusion   Intravenous Continuous PRN Novicki, Patricia, PA-C        sodium chloride 0.9% infusion   Intravenous Continuous PRN Novicki, Patricia, PA-C        sodium chloride 0.9% infusion   Intravenous Continuous PRN Novicki, Patricia, PA-C        sodium chloride 0.9% infusion   Intravenous Continuous PRN Novicki, Patricia, PA-C           CMS Criteria:  ASA: Yes    BB: Yes    Statin:Yes    ACE: Not Applicable    Surgical Care Improvement Project:  Peres in Place: No  Surgical Central Line: Yes, plan to D/C    Cardiologist: Berenice  EF:   Ejection Fraction   Date Value Ref Range Status   02/08/2024 65 % Final       Hemoglobin A1c:   Hemoglobin A1C (%)   Date Value   07/16/2024 5.4     Preoperative Creatinine:   Creatinine (mg/dL)   Date Value   07/23/2024 1.00       Assessment/Plan:  Severe mitral valve regurgitation s/p Mitral valve repair, ADRIÁN:   -Start Coumadin when HWs out  Bradycardia: SB down to 40's overnight after low dose BB, discontinue.   HTN: increase norvasc   Hypervolemia: Diuresis. Monitor creat  Acute blood loss anemia: Expected post-op. 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

## 2024-12-02 NOTE — ED PROVIDER NOTES
Subjective   History of Present Illness  Patient is a 79-year-old female with CHF, CKD, CAD presenting to the ED for periodic episode of delirium.  The patient's daughter states that her mother is usually very with it and sharp but she has been acting very confused.  She was recently hospitalized and treated for C. difficile and she is worried that her mother may be septic again.  She states that she is still having some diarrhea in association with the episode of confusion.  At bedside the patient is answering all questions appropriately she is ANO x 4 and very friendly and in no acute distress.      Review of Systems   Constitutional: Negative.  Negative for fever.   HENT: Negative.     Respiratory: Negative.     Cardiovascular: Negative.  Negative for chest pain.   Gastrointestinal:  Positive for diarrhea. Negative for abdominal pain.   Endocrine: Negative.    Genitourinary: Negative.  Negative for dysuria.   Skin: Negative.    Neurological: Negative.    Psychiatric/Behavioral:  Positive for confusion.    All other systems reviewed and are negative.      Past Medical History:   Diagnosis Date    Anxiety     Breast cancer 11/28/2017    bilateral    CHF (congestive heart failure)     CKD (chronic kidney disease)     Coronary artery disease     3 HEART STENTS    Deep vein thrombosis     right lower extremity    Depression     Disease of thyroid gland     Gastroesophageal reflux     Glaucoma     Hyperlipidemia     Hypertension     MI (myocardial infarction)     Migraine     Peptic ulcer disease     Peripheral arterial disease     KIM LEG STENTS       ANDFEM POP    Peripheral vascular disease     SSS (sick sinus syndrome)     Status post placement of cardiac pacemaker     Wears dentures     Wears glasses        Allergies   Allergen Reactions    Promethazine      hypotension       Past Surgical History:   Procedure Laterality Date    BREAST LUMPECTOMY Right 12/12/2017    Procedure: BREAST LUMPECTOMY WITH NEEDLE  LOCALIZATION;  Surgeon: Lida Babin MD;  Location: [facility] OR;  Service:     BREAST LUMPECTOMY Bilateral 12/27/2017    Procedure: right breast re-excision  left breast re-excision with 3 cm inferior and superior advancement flap;  Surgeon: Lida Babin MD;  Location: [facility] OR;  Service:     BREAST LUMPECTOMY WITH SENTINEL NODE BIOPSY AND AXILLARY NODE DISSECTION Left 12/12/2017    Procedure: Left BREAST LUMPECTOMY WITH SENTINEL NODE BIOPSY  Isotope and Lymphazurin injection left breast. Left breast preoperative needle localization;  Surgeon: Lida Babin MD;  Location: [facility] OR;  Service:     BREAST SURGERY      Mass Removal.    CARDIAC CATHETERIZATION      CARDIAC CATHETERIZATION N/A 06/26/2019    Procedure: Left Heart Cath;  Surgeon: Salvador Mccarthy MD;  Location: [facility] CATH INVASIVE LOCATION;  Service: Cardiology    CARDIAC ELECTROPHYSIOLOGY PROCEDURE Left 12/16/2020    Procedure: PPM generator change - dual;  Surgeon: Salvador Mccarthy MD;  Location: [facility] CATH INVASIVE LOCATION;  Service: Cardiology;  Laterality: Left;    CARPAL TUNNEL RELEASE      CATARACT EXTRACTION Bilateral     ESOPHAGUS SURGERY      FEMORAL DISTAL BYPASS      FEMORAL FEMORAL BYPASS      HIP SURGERY Right     HYSTERECTOMY      benign    PACEMAKER IMPLANTATION      HI RT/LT HEART CATHETERS N/A 06/26/2019    Procedure: Percutaneous Coronary Intervention;  Surgeon: Gus Suero MD;  Location: [facility] CATH INVASIVE LOCATION;  Service: Cardiology    SHOULDER ARTHROSCOPY W/ ROTATOR CUFF REPAIR Left 03/08/2017    Procedure: LEFT SHOULDER ARTHROSCOPY, ACROMIOPLASTY, SUBACROMIC DEBRIDMENT;  Surgeon: Tesfaye Polo MD;  Location: [facility] OR;  Service:     SHOULDER SURGERY         Family History   Problem Relation Age of Onset    Asthma Father     Cancer Brother     No Known Problems Mother     Breast cancer Neg Hx        Social History     Socioeconomic History    Marital status:     Number of children: 4    Tobacco Use    Smoking status: Every Day     Current packs/day: 1.00     Average packs/day: 1 pack/day for 63.9 years (63.9 ttl pk-yrs)     Types: Cigarettes     Start date: 1961    Smokeless tobacco: Never   Vaping Use    Vaping status: Never Used   Substance and Sexual Activity    Alcohol use: No    Drug use: No    Sexual activity: Defer           Objective   Physical Exam  Vitals and nursing note reviewed.   Constitutional:       General: She is not in acute distress.     Appearance: She is well-developed. She is not diaphoretic.   HENT:      Head: Normocephalic and atraumatic.      Right Ear: External ear normal.      Left Ear: External ear normal.      Nose: Nose normal.   Eyes:      Conjunctiva/sclera: Conjunctivae normal.      Pupils: Pupils are equal, round, and reactive to light.   Neck:      Vascular: No JVD.      Trachea: No tracheal deviation.   Cardiovascular:      Rate and Rhythm: Normal rate and regular rhythm.      Heart sounds: Normal heart sounds. No murmur heard.  Pulmonary:      Effort: Pulmonary effort is normal. No respiratory distress.      Breath sounds: Normal breath sounds. No wheezing.   Abdominal:      General: Bowel sounds are normal.      Palpations: Abdomen is soft.      Tenderness: There is no abdominal tenderness.   Musculoskeletal:         General: No deformity. Normal range of motion.      Cervical back: Normal range of motion and neck supple.   Skin:     General: Skin is warm and dry.      Coloration: Skin is not pale.      Findings: No erythema or rash.   Neurological:      Mental Status: She is alert and oriented to person, place, and time.      Cranial Nerves: No cranial nerve deficit.   Psychiatric:         Behavior: Behavior normal.         Thought Content: Thought content normal.         Procedures           ED Course  ED Course as of 12/02/24 0024   Sun Dec 01, 2024   2113 EKG interpretation normal sinus rhythm 66 bpm QTc is 473 no ST elevations or  depressions.  Electronically signed by Dwayne Real DO, 12/01/24, 9:13 PM EST.   [GF]   0670 CT Head Without Contrast [GF]      ED Course User Index  [GF] Dwayne Real DO ANDER                                                       Medical Decision Making  Patient did not have a bowel movement while in the ED.  She continued to stay ANO x 4 throughout her entire stay.  Her laboratory workup was unremarkable without any signs of sepsis.  Her vitals have remained stable.  Per family she does have a known low blood pressure at baseline.  Her CT scans were also negative for any acute findings.  I discussed all these findings with the patient at bedside all questions answered patient and family agree with plan of care at this time we discussed outpatient follow-up.    Problems Addressed:  Delirium: complicated acute illness or injury    Amount and/or Complexity of Data Reviewed  Labs: ordered.  Radiology: ordered. Decision-making details documented in ED Course.  ECG/medicine tests: ordered.    Risk  Prescription drug management.    CT Thoracic Spine Without Contrast    Result Date: 12/1/2024  1. No acute fracture or traumatic listhesis.  This report was finalized on 12/1/2024 11:53 PM by Jayme Salazar MD.      CT Lumbar Spine Without Contrast    Result Date: 12/1/2024  1. No acute fracture or traumatic listhesis.  This report was finalized on 12/1/2024 11:52 PM by Jayme Salazar MD.      CT Abdomen Pelvis Without Contrast    Result Date: 12/1/2024  1. No acute traumatic injury to the abdomen or pelvis.  This report was finalized on 12/1/2024 11:51 PM by Jayme Salazar MD.      CT Chest Without Contrast Diagnostic    Result Date: 12/1/2024  1. No acute traumatic injury to the chest.  This report was finalized on 12/1/2024 11:48 PM by Jayme Salazar MD.      CT Cervical Spine Without Contrast    Result Date: 12/1/2024  1. No acute fracture or traumatic listhesis. 2. Multilevel disc related degenerative changes and  facet arthrosis.  This report was finalized on 12/1/2024 11:46 PM by Jayme Salazar MD.      CT Head Without Contrast    Result Date: 12/1/2024  1. No acute intracranial process.  This report was finalized on 12/1/2024 11:44 PM by Jayme Salazar MD.      XR Chest 1 View    Result Date: 12/1/2024   1.  Normal heart size. 2.  No lobar consolidation or edema. 3.  No pleural effusion or pneumothorax. 4.  Levoconvex curvature at the thoracolumbar junction. 5.  Left-sided pacemaker in place.  This report was finalized on 12/1/2024 10:54 PM by Edwardo Owen MD.     Results for orders placed or performed during the hospital encounter of 12/01/24   CBC Auto Differential    Collection Time: 12/01/24  8:18 PM    Specimen: Arm, Left; Blood   Result Value Ref Range    WBC 12.69 (H) 3.40 - 10.80 10*3/mm3    RBC 4.50 3.77 - 5.28 10*6/mm3    Hemoglobin 14.2 12.0 - 15.9 g/dL    Hematocrit 44.3 34.0 - 46.6 %    MCV 98.4 (H) 79.0 - 97.0 fL    MCH 31.6 26.6 - 33.0 pg    MCHC 32.1 31.5 - 35.7 g/dL    RDW 14.4 12.3 - 15.4 %    RDW-SD 51.9 37.0 - 54.0 fl    MPV 10.3 6.0 - 12.0 fL    Platelets 205 140 - 450 10*3/mm3    Neutrophil % 67.2 42.7 - 76.0 %    Lymphocyte % 22.1 19.6 - 45.3 %    Monocyte % 9.4 5.0 - 12.0 %    Eosinophil % 0.4 0.3 - 6.2 %    Basophil % 0.6 0.0 - 1.5 %    Immature Grans % 0.3 0.0 - 0.5 %    Neutrophils, Absolute 8.54 (H) 1.70 - 7.00 10*3/mm3    Lymphocytes, Absolute 2.80 0.70 - 3.10 10*3/mm3    Monocytes, Absolute 1.19 (H) 0.10 - 0.90 10*3/mm3    Eosinophils, Absolute 0.05 0.00 - 0.40 10*3/mm3    Basophils, Absolute 0.07 0.00 - 0.20 10*3/mm3    Immature Grans, Absolute 0.04 0.00 - 0.05 10*3/mm3    nRBC 0.0 0.0 - 0.2 /100 WBC   Protime-INR    Collection Time: 12/01/24  8:39 PM    Specimen: Blood   Result Value Ref Range    Protime 14.2 12.1 - 14.7 Seconds    INR 1.09 0.90 - 1.10   aPTT    Collection Time: 12/01/24  8:39 PM    Specimen: Blood   Result Value Ref Range    PTT 22.3 (L) 26.5 - 34.5 seconds   Lactic Acid,  Plasma    Collection Time: 12/01/24  8:39 PM    Specimen: Blood   Result Value Ref Range    Lactate 1.4 0.5 - 2.0 mmol/L   Lavender Top    Collection Time: 12/01/24  8:39 PM   Result Value Ref Range    Extra Tube hold for add-on    Light Blue Top    Collection Time: 12/01/24  8:39 PM   Result Value Ref Range    Extra Tube Hold for add-ons.    ECG 12 Lead Other; Sepsis    Collection Time: 12/01/24  8:56 PM   Result Value Ref Range    QT Interval 452 ms    QTC Interval 473 ms   Comprehensive Metabolic Panel    Collection Time: 12/01/24  9:50 PM    Specimen: Arm, Left; Blood   Result Value Ref Range    Glucose 134 (H) 65 - 99 mg/dL    BUN 26 (H) 8 - 23 mg/dL    Creatinine 1.69 (H) 0.57 - 1.00 mg/dL    Sodium 141 136 - 145 mmol/L    Potassium 3.2 (L) 3.5 - 5.2 mmol/L    Chloride 109 (H) 98 - 107 mmol/L    CO2 19.2 (L) 22.0 - 29.0 mmol/L    Calcium 9.0 8.6 - 10.5 mg/dL    Total Protein 5.8 (L) 6.0 - 8.5 g/dL    Albumin 2.9 (L) 3.5 - 5.2 g/dL    ALT (SGPT) 11 1 - 33 U/L    AST (SGOT) 29 1 - 32 U/L    Alkaline Phosphatase 57 39 - 117 U/L    Total Bilirubin 0.5 0.0 - 1.2 mg/dL    Globulin 2.9 gm/dL    A/G Ratio 1.0 g/dL    BUN/Creatinine Ratio 15.4 7.0 - 25.0    Anion Gap 12.8 5.0 - 15.0 mmol/L    eGFR 31.0 (L) >60.0 mL/min/1.73   High Sensitivity Troponin T    Collection Time: 12/01/24  9:50 PM    Specimen: Arm, Left; Blood   Result Value Ref Range    HS Troponin T 31 (H) <14 ng/L   Procalcitonin    Collection Time: 12/01/24  9:50 PM    Specimen: Blood   Result Value Ref Range    Procalcitonin 1.19 (H) 0.00 - 0.25 ng/mL   Green Top (Gel)    Collection Time: 12/01/24  9:50 PM   Result Value Ref Range    Extra Tube Hold for add-ons.    Gold Top - SST    Collection Time: 12/01/24  9:50 PM   Result Value Ref Range    Extra Tube Hold for add-ons.    Urinalysis With Microscopic If Indicated (No Culture) - Straight Cath    Collection Time: 12/01/24 11:59 PM    Specimen: Straight Cath; Urine   Result Value Ref Range    Color, UA  Dark Yellow (A) Yellow, Straw    Appearance, UA Cloudy (A) Clear    pH, UA 5.5 5.0 - 8.0    Specific Gravity, UA 1.018 1.005 - 1.030    Glucose, UA Negative Negative    Ketones, UA Negative Negative    Bilirubin, UA Negative Negative    Blood, UA Small (1+) (A) Negative    Protein,  mg/dL (2+) (A) Negative    Leuk Esterase, UA Small (1+) (A) Negative    Nitrite, UA Negative Negative    Urobilinogen, UA 0.2 E.U./dL 0.2 - 1.0 E.U./dL   Urinalysis, Microscopic Only - Straight Cath    Collection Time: 12/01/24 11:59 PM    Specimen: Straight Cath; Urine   Result Value Ref Range    RBC, UA 3-5 (A) None Seen, 0-2 /HPF    WBC, UA 6-10 (A) None Seen, 0-2 /HPF    Bacteria, UA None Seen None Seen /HPF    Squamous Epithelial Cells, UA 3-6 (A) None Seen, 0-2 /HPF    Hyaline Casts, UA 0-2 None Seen /LPF    Amorphous Crystals, UA Small/1+ None Seen /HPF    Methodology Manual Light Microscopy          Final diagnoses:   Delirium       ED Disposition  ED Disposition       ED Disposition   Discharge    Condition   Stable    Comment   --               Lencho Gutierrez MD  [address] [place] [address]   [facility] [address] [phone]    Schedule an appointment as soon as possible for a visit in 1 week      [facility] EMERGENCY DEPARTMENT  [address] [place] [id number] [phone]  Go to   If symptoms worsen         Medication List      No changes were made to your prescriptions during this visit.            Dwayne Real, DO  12/02/24 0026

## 2024-12-02 NOTE — DISCHARGE INSTRUCTIONS
Your workup today was negative for any acute causes of your episodic confusion.  If your symptoms acutely worsen you can always return to the ER otherwise follow with your primary care physician or seek further consultation with a neurologist.

## 2024-12-02 NOTE — ED NOTES
Patient was placed on a bed pan at this time.  Patient reports that she will press her call light when finished.  Family at bedside for patient assistance per patient's request.

## 2024-12-06 LAB
BACTERIA SPEC AEROBE CULT: NORMAL
BACTERIA SPEC AEROBE CULT: NORMAL

## 2025-02-25 RX ORDER — CILOSTAZOL 50 MG/1
50 TABLET ORAL 2 TIMES DAILY
Qty: 180 TABLET | Refills: 1 | Status: SHIPPED | OUTPATIENT
Start: 2025-02-25

## 2025-02-25 NOTE — TELEPHONE ENCOUNTER
Caller: ValdezSirisha    Relationship: Self    Best call back number: [phone]    Requested Prescriptions:   Requested Prescriptions     Pending Prescriptions Disp Refills    cilostazol (PLETAL) 50 MG tablet 180 tablet 1     Sig: Take 1 tablet by mouth 2 (Two) Times a Day.        Pharmacy where request should be sent: [facility], [place] - [address] [facility] [address] - [phone] PH - [phone] FX     Last office visit with prescribing clinician: 8/27/2024   Last telemedicine visit with prescribing clinician: Visit date not found   Next office visit with prescribing clinician: 4/8/2025     Additional details provided by patient: PATIENT HAS ABOUT 2 DAYS LEFT.     Does the patient have less than a 3 day supply:  [x] Yes  [] No    Would you like a call back once the refill request has been completed: [] Yes [] No    If the office needs to give you a call back, can they leave a voicemail: [] Yes [] No    Stella Luz Rep   02/25/25 09:17 EST

## 2025-03-24 ENCOUNTER — TELEPHONE (OUTPATIENT)
Dept: CARDIOLOGY | Facility: CLINIC | Age: 80
End: 2025-03-24
Payer: MEDICARE

## 2025-03-24 NOTE — TELEPHONE ENCOUNTER
Pt stated her PCP advised her to call us due to her having stents and not getting good blow flow in her legs and having diarrhea on off. She has an apt with  on 4/8.

## 2025-03-25 NOTE — TELEPHONE ENCOUNTER
[facility] 
[address] [place], [address] Ana Paula Wing, Dr. Anneliese Saleh Slot 
[phone] FAX# [phone] Patient known to our service and full consult to follow Name: LIZZETH HENDERSON      Relationship: SELF      Best Callback Number: [phone]      HUB PROVIDED THE RELAY MESSAGE FROM THE OFFICE      PATIENT: VOICED UNDERSTANDING AND HAS NO FURTHER QUESTIONS AT THIS TIME    ADDITIONAL INFORMATION:

## 2025-03-25 NOTE — TELEPHONE ENCOUNTER
If she is having severe pain in her legs, recommend going to the ER.  Otherwise, she should keep follow-up with Dr. Mccarthy.  Would recommend she schedule appointment with PCP for evaluation of diarrhea.

## 2025-04-08 ENCOUNTER — TELEPHONE (OUTPATIENT)
Dept: CARDIOLOGY | Facility: CLINIC | Age: 80
End: 2025-04-08
Payer: MEDICARE

## 2025-04-08 NOTE — TELEPHONE ENCOUNTER
Caller: Juana TERRELL    Relationship: Emergency Contact    Best call back number: [phone] (home)     What is the best time to reach you: ANYTIME    Who are you requesting to speak with (clinical staff, provider,  specific staff member): CLINICAL    What was the call regarding: PT'S DAUGHTER, NOT ON [facility], SAYING THIS APPT WAS AN IMPORTANT ONE TO MAKE FOR P BUT DR. DIEZ IS OUT OF OFFICE TODAYT. SHE WOULD LIKE A CALL TO DISCUSS, PLEASE. SHE SAID SHE HAS AN APPT AND WOULD BE WILLING TO SWITCH HER APPT FOR PT IF POSSIBLE.

## 2025-04-09 ENCOUNTER — TELEPHONE (OUTPATIENT)
Dept: SURGERY | Age: 80
End: 2025-04-09
Payer: MEDICARE

## 2025-04-15 ENCOUNTER — OFFICE VISIT (OUTPATIENT)
Age: 80
End: 2025-04-15
Payer: MEDICARE

## 2025-04-15 VITALS
HEIGHT: 64 IN | DIASTOLIC BLOOD PRESSURE: 51 MMHG | OXYGEN SATURATION: 99 % | BODY MASS INDEX: 16.05 KG/M2 | HEART RATE: 65 BPM | WEIGHT: 94 LBS | SYSTOLIC BLOOD PRESSURE: 101 MMHG

## 2025-04-15 DIAGNOSIS — I49.5 SSS (SICK SINUS SYNDROME): ICD-10-CM

## 2025-04-15 DIAGNOSIS — E78.5 DYSLIPIDEMIA: ICD-10-CM

## 2025-04-15 DIAGNOSIS — I25.10 ARTERIOSCLEROTIC CARDIOVASCULAR DISEASE (ASCVD): Primary | ICD-10-CM

## 2025-04-15 DIAGNOSIS — Z72.0 TOBACCO ABUSE: ICD-10-CM

## 2025-04-15 DIAGNOSIS — I73.9 PERIPHERAL ARTERIAL DISEASE: ICD-10-CM

## 2025-04-15 DIAGNOSIS — N18.32 STAGE 3B CHRONIC KIDNEY DISEASE: ICD-10-CM

## 2025-04-15 PROCEDURE — 3074F SYST BP LT 130 MM HG: CPT | Performed by: INTERNAL MEDICINE

## 2025-04-15 PROCEDURE — 99214 OFFICE O/P EST MOD 30 MIN: CPT | Performed by: INTERNAL MEDICINE

## 2025-04-15 PROCEDURE — 3078F DIAST BP <80 MM HG: CPT | Performed by: INTERNAL MEDICINE

## 2025-04-15 RX ORDER — ROSUVASTATIN CALCIUM 40 MG/1
40 TABLET, COATED ORAL NIGHTLY
Qty: 90 TABLET | Refills: 2 | Status: SHIPPED | OUTPATIENT
Start: 2025-04-15

## 2025-04-15 RX ORDER — CILOSTAZOL 50 MG/1
50 TABLET ORAL 2 TIMES DAILY
Qty: 180 TABLET | Refills: 1 | Status: SHIPPED | OUTPATIENT
Start: 2025-04-15

## 2025-04-15 RX ORDER — NICOTINE 21 MG/24HR
1 PATCH, TRANSDERMAL 24 HOURS TRANSDERMAL EVERY 24 HOURS
Qty: 30 PATCH | Refills: 1 | Status: SHIPPED | OUTPATIENT
Start: 2025-04-15 | End: 2025-04-21

## 2025-04-15 RX ORDER — ASPIRIN 81 MG/1
81 TABLET ORAL DAILY
Qty: 100 TABLET | Refills: 3 | Status: SHIPPED | OUTPATIENT
Start: 2025-04-15

## 2025-04-15 NOTE — PROGRESS NOTES
Lencho Gutierrez MD  Sirisha Valdez  1945  04/15/2025    Patient Active Problem List   Diagnosis     (ASCVD), s/p stenting in 1999, after an MI, repeat stenting in 2005 of the LAD and RCA at [facility] and stenting of the first OM branch of left circumflex on 6/26/2019.Clinically stable.    SSS (sick sinus syndrome), s/p permanent pacemaker implantion,     Palpitations    Dizziness    Dyslipidemia, currently on rosuvastatin 40 mg daily    Essential hypertension, blood pressure is running low associated with fatigue.    PVD (peripheral vascular disease)    Swelling of both lower extremities    Chronic left shoulder pain    S/P arthroscopy of shoulder    Coagulopathy    Bilateral malignant neoplasm of breast in female, estrogen receptor positive    Tobacco abuse    Aromatase inhibitor use    Osteopenia    Abnormal EKG    Abnormal nuclear stress test    Collagenous colitis    Pacemaker battery depletion    Stage 3b chronic kidney disease       Lenhco Donald MD:    Subjective     Sirisha Valdez is a 79 y.o. female with the problems as listed above, presents    Chief complaint: Follow-up of CAD, peripheral artery disease and sick sinus syndrome.    History of Present Illness: Ms. Monica Medrano is a pleasant 79-year-old  female with history of known CAD, s/p previous stenting of the LAD and RCA in 1999 in 2005 respectively, history of sick sinus syndrome, s/p permanent  pacemaker implantation 2011 with battery replacement in 2020 with an [employer] device.  She also has peripheral arterial disease with previous interventions in both the right and left iliofemoral systems (details unavailable), she is here today as a regular follow-up.  On today's visit she denies any complaints of chest pains or significant shortness of breath or leg claudication.  She denies any palpitations, dizziness or syncope.  Her main complaint is that she has been having diarrhea and abdominal pains for which she was  recently evaluated with CT scan of the abdomen and pelvis which revealed among other things an occluded femorofemoral bypass graft with occluded right external iliac artery and mild stenosis of the right internal iliac artery and occluded proximal portion of the right superficial femoral artery with reconstitution of flow with the within the right common femoral artery and profundofemoral artery.  Left common iliac stent appeared to be patent with minimal opacification of the left atrial iliac artery.      She has long history of smoking and continues smoke about a pack a day.    CT abdomen and pelvis 9/28/2020 performed at [facility]..    FINDINGS:  LOWER CHEST:  The heart is normal in size. Severe coronary artery atherosclerotic  calcifications. Right chest wall right atrioventricular implantable  cardiac pacer in place. There is a redundant right ventricular lead  which appears to be folding in the right ventricle. Right lower lobe  bronchial wall thickening, endobronchial debris/mucus. Mild left lower  lobe bronchial wall thickening. Right middle lobe subpleural 7 mm  pulmonary nodule.    ABDOMEN/PELVIS:  Liver, gallbladder and bile ducts:  Moderate hepatic steatosis. No suspicious focal hepatic abnormality.  Unremarkable gallbladder. No intrahepatic biliary ductal dilatation.  Mildly dilated common bile duct measuring up to 10 mm in diameter.    Adrenal glands:  The adrenal glands are morphologically unremarkable without suspicious  lesion.    Kidneys, ureters and urinary bladder:  No suspicious renal lesions. No hydronephrosis. Unremarkable urinary  bladder.    Spleen:  The spleen is normal in size.    Pancreas:  The pancreas is unremarkable.    Gastrointestinal system and mesentery:  Small hiatal hernia. Decompressed stomach with mild diffuse gastric  antral wall thickening. No small bowel dilatation. No large bowel  dilatation. Moderate amount of stool in the colon. There is no evidence  of bowel  obstruction. The appendix is visualized and unremarkable. No  significant mesenteric inflammation.    Lymph nodes:  No pathologically enlarged abdominal or pelvic lymph nodes are present.    Vessels:  Moderate to severe calcific and noncalcific atherosclerotic disease of  the abdominal aorta. Distal descending thoracic aorta is measuring 31 mm  in diameter (series 2, image 5). Infrarenal abdominal aortic fusiform  aneurysm measuring up to 36 mm (series 300, image 46; series 2, image  32). There is a large circumferential/crescentic mural thrombus with  areas of consolidation within the abdominal aorta.    The celiac axis and its branches are patent without significant stenosis  or occlusion.    Superior mesenteric artery and its branches are patent without  significant stenosis or occlusion.    Severe narrowing of the origin of the inferior mesenteric artery with  reconstitution of flow. Inferior mesenteric artery branches are mostly  patent.    Right common iliac artery stent in place, which appears to be  terminating in the mural thrombus. Occluded right external iliac artery.  Mild stenosis of the right internal iliac artery.    Femoral-femoral bypass graft in place, which appears occluded, not  opacified with contrast.    Occluded proximal portion of the right superficial femoral artery.    Reconstitution of flow within the right common femoral artery and  profunda femoral artery.    Left common iliac artery stent in place, which appears to be crossing  the left internal iliac artery. The stent is patent. There is minimal  opacification of the left internal iliac artery.    The superior mesenteric vein, splenic vein and main portal veins are  patent.    The inferior vena cava and hepatic veins are unremarkable.    Peritoneum:  No free intraperitoneal fluid or pneumoperitoneum.    Pelvic viscera:  No acute findings.    Body wall:  No acute findings. No significant body wall hernias.    Bones:  No acute fracture.  Right femur transcervical dynamic screw fixation.  Severe degenerative disc disease at L2-L3.  Exam End: 03/24/25 10:17    Specimen Collected: 03/24/25 11:00 Last Resulted: 03/24/25 14:35   Received From: [employer]  Result Received: 04/15/25 09:57        Allergies   Allergen Reactions    Promethazine      hypotension   :    Current Outpatient Medications:     aspirin 81 MG EC tablet, Take 1 tablet by mouth Daily., Disp: 100 tablet, Rfl: 3    busPIRone (BUSPAR) 30 MG tablet, Take 1 tablet by mouth 4 (Four) Times a Day., Disp: , Rfl:     cefuroxime (CEFTIN) 500 MG tablet, Take 1 tablet by mouth 2 (Two) Times a Day., Disp: 13 tablet, Rfl: 0    cilostazol (PLETAL) 50 MG tablet, Take 1 tablet by mouth 2 (Two) Times a Day., Disp: 180 tablet, Rfl: 1    clotrimazole (LOTRIMIN) 1 % cream, Apply 1 Application topically to the appropriate area as directed 2 (Two) Times a Day., Disp: , Rfl:     empagliflozin (Jardiance) 10 MG tablet tablet, Take 1 tablet by mouth Daily., Disp: , Rfl:     HYDROcodone-acetaminophen (NORCO) 7.5-325 MG per tablet, Take 1 tablet by mouth Every 6 (Six) Hours As Needed for Moderate Pain., Disp: , Rfl:     levothyroxine (SYNTHROID, LEVOTHROID) 25 MCG tablet, Take 1 tablet by mouth Daily., Disp: , Rfl:     lisinopril (PRINIVIL,ZESTRIL) 5 MG tablet, Take 1 tablet by mouth Daily., Disp: , Rfl:     metoprolol tartrate (LOPRESSOR) 25 MG tablet, Take 1 tablet by mouth 2 (Two) Times a Day., Disp: , Rfl:     mupirocin (BACTROBAN) 2 % ointment, Apply  topically to the appropriate area as directed 3 (Three) Times a Day., Disp: , Rfl:     potassium chloride 10 MEQ CR tablet, Take 1 tablet by mouth Daily., Disp: 30 tablet, Rfl: 3    rosuvastatin (CRESTOR) 40 MG tablet, Take 1 tablet by mouth Every Night., Disp: 90 tablet, Rfl: 2    nicotine (Nicoderm CQ) 21 MG/24HR patch, Place 1 patch on the skin as directed by provider Daily., Disp: 30 patch, Rfl: 1    The following portions of the patient's  "history were reviewed and updated as appropriate: allergies, current medications, past family history, past medical history, past social history, past surgical history and problem list.    Social History     Tobacco Use    Smoking status: Every Day     Current packs/day: 1.00     Average packs/day: 1 pack/day for 64.3 years (64.3 ttl pk-yrs)     Types: Cigarettes     Start date: 1961    Smokeless tobacco: Never   Vaping Use    Vaping status: Never Used   Substance Use Topics    Alcohol use: No    Drug use: No     Review of Systems   Constitutional: Negative for chills and fever.   HENT:  Negative for nosebleeds and sore throat.    Respiratory:  Negative for cough, hemoptysis and wheezing.    Gastrointestinal:  Negative for abdominal pain, hematemesis, hematochezia, melena, nausea and vomiting.   Genitourinary:  Negative for dysuria and hematuria.   Neurological:  Negative for headaches.     Objective   Vitals:    04/15/25 1017   BP: 101/51   BP Location: Right arm   Patient Position: Sitting   Cuff Size: Adult   Pulse: 65   SpO2: 99%   Weight: 42.6 kg (94 lb)   Height: 162.6 cm (64\")     Body mass index is 16.14 kg/m².    Vitals reviewed.   Constitutional:       Appearance: Well-developed.   Eyes:      Conjunctiva/sclera: Conjunctivae normal.   HENT:      Head: Normocephalic.   Neck:      Thyroid: No thyromegaly.      Vascular: No JVD.      Trachea: No tracheal deviation.   Pulmonary:      Effort: No respiratory distress.      Breath sounds: Normal breath sounds. No wheezing. No rales.   Cardiovascular:      PMI at left midclavicular line. Normal rate. Regular rhythm. Normal S1. Normal S2.       Murmurs: There is no murmur.      No gallop.  No click. No rub.      Comments: No areas of skin necrosis or gangrene.  Capillary refill is slightly sluggish  Pulses:     Carotid: 2+ bilaterally.     Radial: 2+ bilaterally.     Dorsalis pedis: 1+ bilaterally.     Posterior tibial: 1+ bilaterally.  Edema:     Peripheral edema " absent.   Abdominal:      General: Bowel sounds are normal.      Palpations: Abdomen is soft. There is no abdominal mass.      Tenderness: There is no abdominal tenderness.   Musculoskeletal:      Cervical back: Normal range of motion and neck supple. Skin:     General: Skin is warm and dry.   Neurological:      Mental Status: Alert and oriented to person, place, and time.      Cranial Nerves: No cranial nerve deficit.           Assessment & Plan    Diagnosis Plan   1.  (ASCVD), s/p stenting in 1999, after an MI, repeat stenting in 2005 of the LAD and RCA at [facility] and stenting of the first OM branch of left circumflex on 6/26/2019.Clinically stable.        2. SSS (sick sinus syndrome), s/p permanent pacemaker implantation in 2011 with battery replacement in December 2020 with [employer] device.        3. Peripheral arterial disease  Ambulatory Referral to Cardiothoracic Surgery      4. Tobacco abuse        5. Dyslipidemia  Lipid Panel    Comprehensive Metabolic Panel      6. Stage 3b chronic kidney disease          Recommendations  For her CAD, continue with aspirin and rosuvastatin  For her PAD, continue with aspirin, cilostazol and rosuvastatin.  Will refer her to Dr. Esposito for further evaluation and management of her PAD.  I have strongly reemphasized about smoking cessation.  She is willing to try nicotine patches. I told her not to smoke while wearing the patch as she will get extra nicotine which would be harmful to her.  Patient expressed understanding.  Will prescribe nicotine patches 21 mg patch daily for a month with 1 refill.  Check lipid panel and CMP.    Return in about 3 months (around 7/15/2025) for or sooner if needed.    As always, Dr. Gutierrez  I appreciate very much the opportunity to participate in the cardiovascular care of your patients. Please do not hesitate to call me with any questions with regards to Sirisha Valdez's evaluation and management.       With Best Regards,        Salvador  RICHARD Mccarthy MD, [facility]    Please note that portions of this note were completed with a voice recognition program.

## 2025-04-15 NOTE — LETTER
April 16, 2025     Lencho Gutierrez MD  [address] [facility]   [place] [address]    Patient: Sirisha Valdez   YOB: 1945   Date of Visit: 4/15/2025     Dear Lencho Gutierrez MD:       Thank you for referring Sirisha Valdez to me for evaluation. Below are the relevant portions of my assessment and plan of care.    If you have questions, please do not hesitate to call me. I look forward to following Sirisha along with you.         Sincerely,        Salvador Mccarthy MD        CC: No Recipients    Salvador Mccarthy MD  04/16/25 0931  Sign when Signing Visit  Lencho Gutierrez MD  Sirisha Valdez  1945  04/15/2025    Patient Active Problem List   Diagnosis   •  (ASCVD), s/p stenting in 1999, after an MI, repeat stenting in 2005 of the LAD and RCA at [facility] and stenting of the first OM branch of left circumflex on 6/26/2019.Clinically stable.   • SSS (sick sinus syndrome), s/p permanent pacemaker implantion,    • Palpitations   • Dizziness   • Dyslipidemia, currently on rosuvastatin 40 mg daily   • Essential hypertension, blood pressure is running low associated with fatigue.   • PVD (peripheral vascular disease)   • Swelling of both lower extremities   • Chronic left shoulder pain   • S/P arthroscopy of shoulder   • Coagulopathy   • Bilateral malignant neoplasm of breast in female, estrogen receptor positive   • Tobacco abuse   • Aromatase inhibitor use   • Osteopenia   • Abnormal EKG   • Abnormal nuclear stress test   • Collagenous colitis   • Pacemaker battery depletion   • Stage 3b chronic kidney disease       DeaLencho Etienne MD:    Subjective    Sirisha Valdez is a 79 y.o. female with the problems as listed above, presents    Chief complaint: Follow-up of CAD, peripheral artery disease and sick sinus syndrome.    History of Present Illness: Ms. Monica Medrano is a pleasant 79-year-old  female with history of known CAD, s/p previous stenting of the LAD and RCA in 1999 in 2005 respectively,  history of sick sinus syndrome, s/p permanent  pacemaker implantation 2011 with battery replacement in 2020 with an Abbott device.  She also has peripheral arterial disease with previous interventions in both the right and left iliofemoral systems (details unavailable), she is here today as a regular follow-up.  On today's visit she denies any complaints of chest pains or significant shortness of breath or leg claudication.  She denies any palpitations, dizziness or syncope.  Her main complaint is that she has been having diarrhea and abdominal pains for which she was recently evaluated with CT scan of the abdomen and pelvis which revealed among other things an occluded femorofemoral bypass graft with occluded right external iliac artery and mild stenosis of the right internal iliac artery and occluded proximal portion of the right superficial femoral artery with reconstitution of flow with the within the right common femoral artery and profundofemoral artery.  Left common iliac stent appeared to be patent with minimal opacification of the left atrial iliac artery.      She has long history of smoking and continues smoke about a pack a day.    CT abdomen and pelvis 9/28/2020 performed at [facility]..    FINDINGS:  LOWER CHEST:  The heart is normal in size. Severe coronary artery atherosclerotic  calcifications. Right chest wall right atrioventricular implantable  cardiac pacer in place. There is a redundant right ventricular lead  which appears to be folding in the right ventricle. Right lower lobe  bronchial wall thickening, endobronchial debris/mucus. Mild left lower  lobe bronchial wall thickening. Right middle lobe subpleural 7 mm  pulmonary nodule.    ABDOMEN/PELVIS:  Liver, gallbladder and bile ducts:  Moderate hepatic steatosis. No suspicious focal hepatic abnormality.  Unremarkable gallbladder. No intrahepatic biliary ductal dilatation.  Mildly dilated common bile duct measuring up to 10 mm in  diameter.    Adrenal glands:  The adrenal glands are morphologically unremarkable without suspicious  lesion.    Kidneys, ureters and urinary bladder:  No suspicious renal lesions. No hydronephrosis. Unremarkable urinary  bladder.    Spleen:  The spleen is normal in size.    Pancreas:  The pancreas is unremarkable.    Gastrointestinal system and mesentery:  Small hiatal hernia. Decompressed stomach with mild diffuse gastric  antral wall thickening. No small bowel dilatation. No large bowel  dilatation. Moderate amount of stool in the colon. There is no evidence  of bowel obstruction. The appendix is visualized and unremarkable. No  significant mesenteric inflammation.    Lymph nodes:  No pathologically enlarged abdominal or pelvic lymph nodes are present.    Vessels:  Moderate to severe calcific and noncalcific atherosclerotic disease of  the abdominal aorta. Distal descending thoracic aorta is measuring 31 mm  in diameter (series 2, image 5). Infrarenal abdominal aortic fusiform  aneurysm measuring up to 36 mm (series 300, image 46; series 2, image  32). There is a large circumferential/crescentic mural thrombus with  areas of consolidation within the abdominal aorta.    The celiac axis and its branches are patent without significant stenosis  or occlusion.    Superior mesenteric artery and its branches are patent without  significant stenosis or occlusion.    Severe narrowing of the origin of the inferior mesenteric artery with  reconstitution of flow. Inferior mesenteric artery branches are mostly  patent.    Right common iliac artery stent in place, which appears to be  terminating in the mural thrombus. Occluded right external iliac artery.  Mild stenosis of the right internal iliac artery.    Femoral-femoral bypass graft in place, which appears occluded, not  opacified with contrast.    Occluded proximal portion of the right superficial femoral artery.    Reconstitution of flow within the right common femoral  artery and  profunda femoral artery.    Left common iliac artery stent in place, which appears to be crossing  the left internal iliac artery. The stent is patent. There is minimal  opacification of the left internal iliac artery.    The superior mesenteric vein, splenic vein and main portal veins are  patent.    The inferior vena cava and hepatic veins are unremarkable.    Peritoneum:  No free intraperitoneal fluid or pneumoperitoneum.    Pelvic viscera:  No acute findings.    Body wall:  No acute findings. No significant body wall hernias.    Bones:  No acute fracture. Right femur transcervical dynamic screw fixation.  Severe degenerative disc disease at L2-L3.  Exam End: 03/24/25 10:17    Specimen Collected: 03/24/25 11:00 Last Resulted: 03/24/25 14:35   Received From: [facility]  Result Received: 04/15/25 09:57        Allergies   Allergen Reactions   • Promethazine      hypotension   :    Current Outpatient Medications:   •  aspirin 81 MG EC tablet, Take 1 tablet by mouth Daily., Disp: 100 tablet, Rfl: 3  •  busPIRone (BUSPAR) 30 MG tablet, Take 1 tablet by mouth 4 (Four) Times a Day., Disp: , Rfl:   •  cefuroxime (CEFTIN) 500 MG tablet, Take 1 tablet by mouth 2 (Two) Times a Day., Disp: 13 tablet, Rfl: 0  •  cilostazol (PLETAL) 50 MG tablet, Take 1 tablet by mouth 2 (Two) Times a Day., Disp: 180 tablet, Rfl: 1  •  clotrimazole (LOTRIMIN) 1 % cream, Apply 1 Application topically to the appropriate area as directed 2 (Two) Times a Day., Disp: , Rfl:   •  empagliflozin (Jardiance) 10 MG tablet tablet, Take 1 tablet by mouth Daily., Disp: , Rfl:   •  HYDROcodone-acetaminophen (NORCO) 7.5-325 MG per tablet, Take 1 tablet by mouth Every 6 (Six) Hours As Needed for Moderate Pain., Disp: , Rfl:   •  levothyroxine (SYNTHROID, LEVOTHROID) 25 MCG tablet, Take 1 tablet by mouth Daily., Disp: , Rfl:   •  lisinopril (PRINIVIL,ZESTRIL) 5 MG tablet, Take 1 tablet by mouth Daily., Disp: , Rfl:   •  metoprolol  "tartrate (LOPRESSOR) 25 MG tablet, Take 1 tablet by mouth 2 (Two) Times a Day., Disp: , Rfl:   •  mupirocin (BACTROBAN) 2 % ointment, Apply  topically to the appropriate area as directed 3 (Three) Times a Day., Disp: , Rfl:   •  potassium chloride 10 MEQ CR tablet, Take 1 tablet by mouth Daily., Disp: 30 tablet, Rfl: 3  •  rosuvastatin (CRESTOR) 40 MG tablet, Take 1 tablet by mouth Every Night., Disp: 90 tablet, Rfl: 2  •  nicotine (Nicoderm CQ) 21 MG/24HR patch, Place 1 patch on the skin as directed by provider Daily., Disp: 30 patch, Rfl: 1    The following portions of the patient's history were reviewed and updated as appropriate: allergies, current medications, past family history, past medical history, past social history, past surgical history and problem list.    Social History     Tobacco Use   • Smoking status: Every Day     Current packs/day: 1.00     Average packs/day: 1 pack/day for 64.3 years (64.3 ttl pk-yrs)     Types: Cigarettes     Start date: 1961   • Smokeless tobacco: Never   Vaping Use   • Vaping status: Never Used   Substance Use Topics   • Alcohol use: No   • Drug use: No     Review of Systems   Constitutional: Negative for chills and fever.   HENT:  Negative for nosebleeds and sore throat.    Respiratory:  Negative for cough, hemoptysis and wheezing.    Gastrointestinal:  Negative for abdominal pain, hematemesis, hematochezia, melena, nausea and vomiting.   Genitourinary:  Negative for dysuria and hematuria.   Neurological:  Negative for headaches.     Objective  Vitals:    04/15/25 1017   BP: 101/51   BP Location: Right arm   Patient Position: Sitting   Cuff Size: Adult   Pulse: 65   SpO2: 99%   Weight: 42.6 kg (94 lb)   Height: 162.6 cm (64\")     Body mass index is 16.14 kg/m².    Vitals reviewed.   Constitutional:       Appearance: Well-developed.   Eyes:      Conjunctiva/sclera: Conjunctivae normal.   HENT:      Head: Normocephalic.   Neck:      Thyroid: No thyromegaly.      Vascular: No " JVD.      Trachea: No tracheal deviation.   Pulmonary:      Effort: No respiratory distress.      Breath sounds: Normal breath sounds. No wheezing. No rales.   Cardiovascular:      PMI at left midclavicular line. Normal rate. Regular rhythm. Normal S1. Normal S2.       Murmurs: There is no murmur.      No gallop.  No click. No rub.      Comments: No areas of skin necrosis or gangrene.  Capillary refill is slightly sluggish  Pulses:     Carotid: 2+ bilaterally.     Radial: 2+ bilaterally.     Dorsalis pedis: 1+ bilaterally.     Posterior tibial: 1+ bilaterally.  Edema:     Peripheral edema absent.   Abdominal:      General: Bowel sounds are normal.      Palpations: Abdomen is soft. There is no abdominal mass.      Tenderness: There is no abdominal tenderness.   Musculoskeletal:      Cervical back: Normal range of motion and neck supple. Skin:     General: Skin is warm and dry.   Neurological:      Mental Status: Alert and oriented to person, place, and time.      Cranial Nerves: No cranial nerve deficit.           Assessment & Plan   Diagnosis Plan   1.  (ASCVD), s/p stenting in 1999, after an MI, repeat stenting in 2005 of the LAD and RCA at [facility] and stenting of the first OM branch of left circumflex on 6/26/2019.Clinically stable.        2. SSS (sick sinus syndrome), s/p permanent pacemaker implantation in 2011 with battery replacement in December 2020 with Abbotts device.        3. Peripheral arterial disease  Ambulatory Referral to Cardiothoracic Surgery      4. Tobacco abuse        5. Dyslipidemia  Lipid Panel    Comprehensive Metabolic Panel      6. Stage 3b chronic kidney disease          Recommendations  For her CAD, continue with aspirin and rosuvastatin  For her PAD, continue with aspirin, cilostazol and rosuvastatin.  Will refer her to Dr. Esposito for further evaluation and management of her PAD.  I have strongly reemphasized about smoking cessation.  She is willing to try nicotine patches. I  told her not to smoke while wearing the patch as she will get extra nicotine which would be harmful to her.  Patient expressed understanding.  Will prescribe nicotine patches 21 mg patch daily for a month with 1 refill.  Check lipid panel and CMP.    Return in about 3 months (around 7/15/2025) for or sooner if needed.    As always, Dr. Gutierrez  I appreciate very much the opportunity to participate in the cardiovascular care of your patients. Please do not hesitate to call me with any questions with regards to Sirisha Valdez's evaluation and management.       With Best Regards,        Salvador Mccarthy MD, FACC    Please note that portions of this note were completed with a voice recognition program.

## 2025-04-16 ENCOUNTER — TELEPHONE (OUTPATIENT)
Dept: CARDIOLOGY | Facility: CLINIC | Age: 80
End: 2025-04-16

## 2025-04-16 NOTE — TELEPHONE ENCOUNTER
Caller: Sirisha Valdez    Relationship: Self    Best call back number: [phone]     What is the best time to reach you: ANYTIME    Who are you requesting to speak with (clinical staff, provider,  specific staff member): PROVIDER    Do you know the name of the person who called: NA    What was the call regarding: PT REPORTS NICOTINE PATCHES ARE $89 AND CANNOT AFFORD, AS SHE IS ON FIXED INCOME. WANTS TO KNOW IF WE CAN GET THESE CHEAPER SOMEHOW OR WHAT OUR ALTERNATIVES ARE. PLEASE ADVISE.     Is it okay if the provider responds through [employer]: NO

## 2025-04-16 NOTE — TELEPHONE ENCOUNTER
"Caller: Sirisha Valdez    Relationship: Self    Best call back number: [phone]     What is the best time to reach you: ANYTIME BEFORE CLOSING     What was the call regarding: PT STATES THAT THE INSURANCE WILL NOT PAY THE FOR \"SMOKE PATCHES\" , WONDERING  IF THIS CAN BE AUTHORIZED OR IF SOMETHING ELSE CAN BE ORDERED FOR HER- PLEASE ADVISE.     Is it okay if the provider responds through [employer]: CALL       "

## 2025-04-17 ENCOUNTER — TELEPHONE (OUTPATIENT)
Dept: CARDIOLOGY | Facility: CLINIC | Age: 80
End: 2025-04-17
Payer: MEDICARE

## 2025-04-17 NOTE — TELEPHONE ENCOUNTER
Hub to relay.       Jose G Contreras PA-C to [facility]  (Selected Message)      4/17/25  9:15 AM  Note      Not that I'm aware. But I think she would be cheaper if she checked different pharmacys           Called pt no answer LM

## 2025-04-17 NOTE — TELEPHONE ENCOUNTER
Name: Sirisha Valdez      Relationship: Self      Best Callback Number: [phone]      HUB PROVIDED THE RELAY MESSAGE FROM THE OFFICE      PATIENT: VOICED UNDERSTANDING AND HAS NO FURTHER QUESTIONS AT THIS TIME

## 2025-04-18 ENCOUNTER — TELEPHONE (OUTPATIENT)
Dept: CARDIOLOGY | Facility: CLINIC | Age: 80
End: 2025-04-18

## 2025-04-18 NOTE — TELEPHONE ENCOUNTER
Caller: Sirisha Valdez    Relationship: SELF    Best call back number: [phone] (home)     What is the best time to reach you:  MORNINGS    Who are you requesting to speak with (clinical staff, provider,  specific staff member): ANYONE    What was the call regarding: PT MISSED CALL 4.17.25 AROUND 6 PM. NO NOTES IN CHART. PLEASE CALL PT WHEN AVAILABLE.

## 2025-04-21 ENCOUNTER — TELEPHONE (OUTPATIENT)
Dept: CARDIOLOGY | Facility: CLINIC | Age: 80
End: 2025-04-21
Payer: MEDICARE

## 2025-04-21 RX ORDER — NICOTINE 21 MG/24HR
1 PATCH, TRANSDERMAL 24 HOURS TRANSDERMAL EVERY 24 HOURS
Qty: 30 PATCH | Refills: 1 | Status: SHIPPED | OUTPATIENT
Start: 2025-04-21

## 2025-04-21 NOTE — TELEPHONE ENCOUNTER
Caller: Sirisha Valdez    Relationship: Self    Best call back number: [phone]    What is the best time to reach you: ANY    Who are you requesting to speak with (clinical staff, provider,  specific staff member): CLINICAL    What was the call regarding: PT STATES SHE SPOKE TO [facility] AND THEY SAID IF WE COULD SEND OVER A SCRIPT FOR HER PATCHES, THEY WOULD GET HER A PA TO COVER THE COST.

## 2025-04-21 NOTE — TELEPHONE ENCOUNTER
Hub to relay.     Patches were sent to [facility]. Confirmed with pt that is her correct pharmacy. I have resent her script.

## 2025-04-23 ENCOUNTER — TELEPHONE (OUTPATIENT)
Dept: CARDIAC SURGERY | Facility: CLINIC | Age: 80
End: 2025-04-23
Payer: MEDICARE

## 2025-04-23 NOTE — TELEPHONE ENCOUNTER
Caller: Sirisha Valdez    Relationship to patient: Self    Best call back number:   [phone] b       Chief complaint: LATER TIME     Type of visit: FOLLOW UP     Requested date: 5/20/25 IN AFTERNOON      If rescheduling, when is the original appointment: 5/20/25     Additional notes:PATIENT WOULD LIKE A CALL BACK.

## 2025-04-24 NOTE — TELEPHONE ENCOUNTER
Spoke with patient, requested afternoon appointment.  Rescheduled to 6/2/25 at 2:30 with Alayna AVERY.

## 2025-05-29 ENCOUNTER — HOSPITAL ENCOUNTER (OUTPATIENT)
Dept: BONE DENSITY | Facility: HOSPITAL | Age: 80
Discharge: HOME | End: 2025-05-29
Payer: MEDICARE

## 2025-05-29 ENCOUNTER — HOSPITAL ENCOUNTER (OUTPATIENT)
Dept: MAMMOGRAPHY | Facility: HOSPITAL | Age: 80
Discharge: HOME | End: 2025-05-29
Payer: MEDICARE

## 2025-05-29 DIAGNOSIS — Z12.31 VISIT FOR SCREENING MAMMOGRAM: ICD-10-CM

## 2025-05-29 DIAGNOSIS — Z85.3 HISTORY OF BREAST CANCER: ICD-10-CM

## 2025-05-29 DIAGNOSIS — M85.852 OSTEOPENIA OF NECK OF LEFT FEMUR: ICD-10-CM

## 2025-05-29 PROCEDURE — 77063 BREAST TOMOSYNTHESIS BI: CPT

## 2025-05-29 PROCEDURE — 77067 SCR MAMMO BI INCL CAD: CPT

## 2025-05-29 PROCEDURE — 77080 DXA BONE DENSITY AXIAL: CPT
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
## 2025-06-02 ENCOUNTER — OFFICE VISIT (OUTPATIENT)
Dept: SURGERY | Age: 80
End: 2025-06-02
Payer: MEDICARE

## 2025-06-02 ENCOUNTER — OFFICE VISIT (OUTPATIENT)
Dept: CARDIAC SURGERY | Facility: CLINIC | Age: 80
End: 2025-06-02
Payer: MEDICARE

## 2025-06-02 VITALS
SYSTOLIC BLOOD PRESSURE: 112 MMHG | HEIGHT: 63 IN | DIASTOLIC BLOOD PRESSURE: 58 MMHG | BODY MASS INDEX: 16.83 KG/M2 | HEART RATE: 68 BPM | WEIGHT: 95 LBS

## 2025-06-02 VITALS
SYSTOLIC BLOOD PRESSURE: 110 MMHG | OXYGEN SATURATION: 98 % | BODY MASS INDEX: 16.76 KG/M2 | HEART RATE: 76 BPM | WEIGHT: 94.6 LBS | HEIGHT: 63 IN | TEMPERATURE: 96.8 F | DIASTOLIC BLOOD PRESSURE: 62 MMHG

## 2025-06-02 DIAGNOSIS — M85.852 OSTEOPENIA OF NECK OF LEFT FEMUR: ICD-10-CM

## 2025-06-02 DIAGNOSIS — Z85.3 HISTORY OF BREAST CANCER: Primary | ICD-10-CM

## 2025-06-02 DIAGNOSIS — I73.9 PVD (PERIPHERAL VASCULAR DISEASE): Primary | ICD-10-CM

## 2025-06-02 DIAGNOSIS — I71.43 INFRARENAL ABDOMINAL AORTIC ANEURYSM (AAA) WITHOUT RUPTURE: ICD-10-CM

## 2025-06-02 PROCEDURE — 3074F SYST BP LT 130 MM HG: CPT | Performed by: NURSE PRACTITIONER

## 2025-06-02 PROCEDURE — 99406 BEHAV CHNG SMOKING 3-10 MIN: CPT | Performed by: NURSE PRACTITIONER

## 2025-06-02 PROCEDURE — 99215 OFFICE O/P EST HI 40 MIN: CPT | Performed by: NURSE PRACTITIONER

## 2025-06-02 PROCEDURE — 1160F RVW MEDS BY RX/DR IN RCRD: CPT | Performed by: NURSE PRACTITIONER

## 2025-06-02 PROCEDURE — 1159F MED LIST DOCD IN RCRD: CPT | Performed by: NURSE PRACTITIONER

## 2025-06-02 PROCEDURE — 3078F DIAST BP <80 MM HG: CPT | Performed by: NURSE PRACTITIONER

## 2025-06-02 RX ORDER — PANTOPRAZOLE SODIUM 40 MG/1
40 TABLET, DELAYED RELEASE ORAL DAILY
COMMUNITY
Start: 2025-03-24

## 2025-06-02 RX ORDER — DIAZEPAM 10 MG/1
10 TABLET ORAL EVERY 12 HOURS PRN
COMMUNITY
Start: 2025-04-01

## 2025-06-02 RX ORDER — DIPHENOXYLATE HYDROCHLORIDE AND ATROPINE SULFATE 2.5; .025 MG/1; MG/1
1 TABLET ORAL DAILY
COMMUNITY

## 2025-06-02 NOTE — PROGRESS NOTES
Subjective   Sirisha Valdez is a 79 y.o. female here today for mammogram and DEXA review.    History of Present Illness  Ms. Valdez was seen in the office today for breast cancer follow-up.  She is status post a left breast lumpectomy and sentinel lymph node biopsy, and a right breast lumpectomy on 12/12/17.  Final pathology demonstrated left T1bN0 infiltrating ductal carcinoma, ER positive, HI positive, HER-2 negative.  The anterior margin was positive for invasive tumor and other margins were positive for DCIS.  The right lumpectomy demonstrated DCIS, ER positive, HI positive with close but clear margins.  The patient underwent margin reexcision of the left and the right breast on 12/27/17.  The right lumpectomy showed no residual disease and the left side demonstrated a very small focus of DCIS.   Due to the patient's age and clear margin status the decision was made to forego radiation.  The patient was started on anastrozole in January, 2018.  Patient completed 5 years of therapy in May 2023 and anastrozole was discontinued.  The patient has no complaints referable to the breast other than occasional left lateral breast pain.  She denies any arm swelling or axillary adenopathy.  She denies any new bone or joint pain or other symptoms of metastatic disease.  Sirisha had a bilateral mammogram with tomosynthesis on 5/9/25 which demonstrated presumed postoperative changes for which a12-month follow-up was recommended.  Bone density was performed on 5/29/25 which demonstrated the diagnosis of osteopenia with a maximum T score of -1.3  Allergies   Allergen Reactions    Promethazine      hypotension       Current Outpatient Medications   Medication Sig Dispense Refill    aspirin 81 MG EC tablet Take 1 tablet by mouth Daily. 100 tablet 3    busPIRone (BUSPAR) 30 MG tablet Take 1 tablet by mouth 4 (Four) Times a Day.      cilostazol (PLETAL) 50 MG tablet Take 1 tablet by mouth 2 (Two) Times a Day. 180 tablet 1     clotrimazole (LOTRIMIN) 1 % cream Apply 1 Application topically to the appropriate area as directed 2 (Two) Times a Day.      empagliflozin (Jardiance) 10 MG tablet tablet Take 1 tablet by mouth Daily.      levothyroxine (SYNTHROID, LEVOTHROID) 25 MCG tablet Take 1 tablet by mouth Daily.      lisinopril (PRINIVIL,ZESTRIL) 5 MG tablet Take 1 tablet by mouth Daily.      metoprolol tartrate (LOPRESSOR) 25 MG tablet Take 1 tablet by mouth 2 (Two) Times a Day.      mupirocin (BACTROBAN) 2 % ointment Apply  topically to the appropriate area as directed 3 (Three) Times a Day.      potassium chloride 10 MEQ CR tablet Take 1 tablet by mouth Daily. 30 tablet 3    rosuvastatin (CRESTOR) 40 MG tablet Take 1 tablet by mouth Every Night. 90 tablet 2    HYDROcodone-acetaminophen (NORCO) 7.5-325 MG per tablet Take 1 tablet by mouth Every 6 (Six) Hours As Needed for Moderate Pain. (Patient not taking: Reported on 6/2/2025)      nicotine (Nicoderm CQ) 21 MG/24HR patch Place 1 patch on the skin as directed by provider Daily. (Patient not taking: Reported on 6/2/2025) 30 patch 1     No current facility-administered medications for this visit.     Past Medical History:   Diagnosis Date    Anxiety     Breast cancer 11/28/2017    bilateral    CHF (congestive heart failure)     CKD (chronic kidney disease)     Coronary artery disease     3 HEART STENTS    Deep vein thrombosis     right lower extremity    Depression     Disease of thyroid gland     Gastroesophageal reflux     Glaucoma     Hyperlipidemia     Hypertension     MI (myocardial infarction)     Migraine     Peptic ulcer disease     Peripheral arterial disease     KIM LEG STENTS       ANDFEM POP    Peripheral vascular disease     SSS (sick sinus syndrome)     Status post placement of cardiac pacemaker     Wears dentures     Wears glasses      Past Surgical History:   Procedure Laterality Date    BREAST LUMPECTOMY Right 12/12/2017    Procedure: BREAST LUMPECTOMY WITH NEEDLE  LOCALIZATION;  Surgeon: Lida Babin MD;  Location: [facility] OR;  Service:     BREAST LUMPECTOMY Bilateral 12/27/2017    Procedure: right breast re-excision  left breast re-excision with 3 cm inferior and superior advancement flap;  Surgeon: Lida Babin MD;  Location: [facility] OR;  Service:     BREAST LUMPECTOMY WITH SENTINEL NODE BIOPSY AND AXILLARY NODE DISSECTION Left 12/12/2017    Procedure: Left BREAST LUMPECTOMY WITH SENTINEL NODE BIOPSY  Isotope and Lymphazurin injection left breast. Left breast preoperative needle localization;  Surgeon: Lida Babin MD;  Location: [facility] OR;  Service:     BREAST SURGERY      Mass Removal.    CARDIAC CATHETERIZATION      CARDIAC CATHETERIZATION N/A 06/26/2019    Procedure: Left Heart Cath;  Surgeon: Salvador Mccarthy MD;  Location: [facility] CATH INVASIVE LOCATION;  Service: Cardiology    CARDIAC ELECTROPHYSIOLOGY PROCEDURE Left 12/16/2020    Procedure: PPM generator change - dual;  Surgeon: Salvador Mccarthy MD;  Location: [facility] CATH INVASIVE LOCATION;  Service: Cardiology;  Laterality: Left;    CARPAL TUNNEL RELEASE      CATARACT EXTRACTION Bilateral     ESOPHAGUS SURGERY      FEMORAL DISTAL BYPASS      FEMORAL FEMORAL BYPASS      HIP SURGERY Right     HYSTERECTOMY      benign    PACEMAKER IMPLANTATION      [place] RT/LT HEART CATHETERS N/A 06/26/2019    Procedure: Percutaneous Coronary Intervention;  Surgeon: Gus Suero MD;  Location: [facility] CATH INVASIVE LOCATION;  Service: Cardiology    SHOULDER ARTHROSCOPY W/ ROTATOR CUFF REPAIR Left 03/08/2017    Procedure: LEFT SHOULDER ARTHROSCOPY, ACROMIOPLASTY, SUBACROMIC DEBRIDMENT;  Surgeon: Tesfaye Polo MD;  Location: [facility] OR;  Service:     SHOULDER SURGERY         Pertinent Review of Systems  Anorexia with weight loss.  Patient apparently has significant vascular disease and is scheduled to see Dr. Esposito in [place] this afternoon to address that issue    Objective   /58 (BP Location: Left arm)   " Pulse 68   Ht 160 cm (63\")   Wt 43.1 kg (95 lb)   BMI 16.83 kg/m²    Physical Exam  General:  This is a WD thin female in no acute distress.  Clinically the patient has had significant weight loss from prior  Vital signs stable, afebrile  HEENT exam:  WNL. Sclera are anicteric.  EOMI  Neck:  supple, FROM.  No JVD.  Trachea midline.  No palpable thyroid nodules. No supraclavicular adenopathy  Lungs:  Respiratory effort normal. Auscultation: Clear, without wheezes, rhonchi, rales  Heart:  Regular rate and rhythm, without murmur, gallop, rub.  No pedal edema  Breasts: On visual inspection there is some mild volume loss on the left..  Examination of the right breast demonstrates a well-healed circumareolar scar, without discrete mass, skin change, axillary adenopathy.  Examination of the left breast demonstrates a well-healed circumareolar scar, without discrete mass, skin change, axillary adenopathy.  Skin: Extensive bruising secondary to antiplatelet therapy  Procedures   L-Dex® Analysis for Lymphedema  The patient had a SOZO measurement which I reviewed today. The score is   14.5, with the prior on 3/28/2024 being 9.3, see scanned to EMR. Bioimpedance spectroscopy helps identify the   onset of lymphedema in an arm or leg before patients experience noticeable swelling. Research has   shown that 92% of patients with early detection of lymphedema using L-Dex combined with   intervention do not progress to chronic lymphedema through three years. Additionally, as of March 2023, the NCCN Guidelines® for Survivorship recommend proactive screening for lymphedema using   bioimpedance spectroscopy. Whenever possible, patients are tested for baseline L-Dex score before   cancer treatment begins and then are reassessed during regular follow-up visits using the SOZO device.   Otherwise, this can be started postoperatively and continued during regular follow-up visits. If the   patient's L-Dex score increases above normal " levels, that is a sign that lymphedema is developing and a   referral is made to physical therapy for further evaluation and early compression treatment.   Lymphedema assessment with the SOZO L-Dex score is recommended to be done every 3 months for   the first 3 years and then every 6 months for years 4 and 5 followed by annually afterwards  Results/Data:  Imaging: Mammogram report and images and bone density report from 5/29/2025 were reviewed.  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  Grade 1 infiltrating ductal carcinoma of the left breast T1b N0 MX, ER positive, TX positive  Both sites with clear margins on reexcision  Stable osteopenia  Status post completion of 5 years of AI with subsequent discontinuation of anastrozole    Plan: Bilateral mammogram in May, 2026 with return to the office following  Continue calcium and vitamin D       Discussion/Summary:    Time spent:            @[email or web address]    This document has been electronically signed by        June 2, 2025 10:31 EDT    Please note that portions of this note were completed with a voice recognition program.

## 2025-06-03 DIAGNOSIS — Z85.3 HISTORY OF BREAST CANCER: Primary | ICD-10-CM

## 2025-06-04 DIAGNOSIS — I73.9 PVD (PERIPHERAL VASCULAR DISEASE): Primary | ICD-10-CM

## 2025-06-04 DIAGNOSIS — I75.89 ATHEROEMBOLISM OF OTHER SITE: ICD-10-CM

## 2025-06-09 ENCOUNTER — TELEPHONE (OUTPATIENT)
Dept: CARDIOLOGY | Facility: CLINIC | Age: 80
End: 2025-06-09
Payer: MEDICARE

## 2025-06-09 RX ORDER — ASPIRIN 81 MG/1
81 TABLET ORAL DAILY
Qty: 30 TABLET | Refills: 5 | Status: SHIPPED | OUTPATIENT
Start: 2025-06-09

## 2025-06-09 NOTE — TELEPHONE ENCOUNTER
Caller: Juana Cervantes    Relationship: Emergency Contact    Best call back number: [phone]    What is the best time to reach you: ANY    Who are you requesting to speak with (clinical staff, provider,  specific staff member): CLINICAL       What was the call regarding: PATIENT WAS CURIOUS IF VITAMIN K AND C AS WELL AS NIACIN WOULD BE GOOD FOR HER BLOCKAGES.     Is it okay if the provider responds through MyChart: NO

## 2025-06-09 NOTE — TELEPHONE ENCOUNTER
Caller: Sirisha Valdez    Relationship: Self    Best call back number: [phone]     What is the best time to reach you: ANYTIME    Who are you requesting to speak with (clinical staff, provider,  specific staff member): PROVIDER    Do you know the name of the person who called: NA    What was the call regarding:     PT WANTS TO KNOW IF SHE CAN TAKE VITAMIN C, VITAMIN K, AND NIACIN. ALSO PT WOULD LIKE TO BUMP UP APPT SOONER IF POSSIBLE. PLEASE ADVISE.       Is it okay if the provider responds through MyChart: NO

## 2025-06-10 NOTE — TELEPHONE ENCOUNTER
Pt advised that  is booked til August and I can get her in with one of the mid levels. She stated she will keep her apt in July with Dr. Mccarthy.

## 2025-07-07 ENCOUNTER — HOSPITAL ENCOUNTER (OUTPATIENT)
Dept: CT IMAGING | Facility: HOSPITAL | Age: 80
Discharge: HOME | End: 2025-07-07
Payer: MEDICARE

## 2025-07-07 ENCOUNTER — HOSPITAL ENCOUNTER (OUTPATIENT)
Dept: ULTRASOUND IMAGING | Facility: HOSPITAL | Age: 80
Discharge: HOME | End: 2025-07-07
Payer: MEDICARE

## 2025-07-07 DIAGNOSIS — I73.9 PVD (PERIPHERAL VASCULAR DISEASE): ICD-10-CM

## 2025-07-07 DIAGNOSIS — I75.89 ATHEROEMBOLISM OF OTHER SITE: ICD-10-CM

## 2025-07-07 PROCEDURE — 93922 UPR/L XTREMITY ART 2 LEVELS: CPT

## 2025-07-07 PROCEDURE — 93922 UPR/L XTREMITY ART 2 LEVELS: CPT | Performed by: RADIOLOGY

## 2025-07-07 RX ORDER — CILOSTAZOL 50 MG/1
50 TABLET ORAL 2 TIMES DAILY
Qty: 180 TABLET | Refills: 0 | Status: SHIPPED | OUTPATIENT
Start: 2025-07-07

## 2025-07-16 DIAGNOSIS — I73.9 PVD (PERIPHERAL VASCULAR DISEASE): Primary | ICD-10-CM

## 2025-07-16 DIAGNOSIS — I70.232 ATHEROSCLEROSIS OF NATIVE ARTERIES OF RIGHT LEG WITH ULCERATION OF CALF: ICD-10-CM

## 2025-07-22 ENCOUNTER — OFFICE VISIT (OUTPATIENT)
Age: 80
End: 2025-07-22
Payer: MEDICARE

## 2025-07-22 VITALS
HEIGHT: 62 IN | OXYGEN SATURATION: 96 % | BODY MASS INDEX: 18.4 KG/M2 | HEART RATE: 70 BPM | WEIGHT: 100 LBS | DIASTOLIC BLOOD PRESSURE: 65 MMHG | SYSTOLIC BLOOD PRESSURE: 123 MMHG

## 2025-07-22 DIAGNOSIS — I25.10 ARTERIOSCLEROTIC CARDIOVASCULAR DISEASE (ASCVD): Primary | ICD-10-CM

## 2025-07-22 DIAGNOSIS — I73.9 PERIPHERAL ARTERIAL DISEASE: ICD-10-CM

## 2025-07-22 DIAGNOSIS — Z72.0 TOBACCO ABUSE: ICD-10-CM

## 2025-07-22 DIAGNOSIS — N18.32 STAGE 3B CHRONIC KIDNEY DISEASE: ICD-10-CM

## 2025-07-22 DIAGNOSIS — E78.5 DYSLIPIDEMIA: ICD-10-CM

## 2025-07-22 DIAGNOSIS — I49.5 SSS (SICK SINUS SYNDROME): ICD-10-CM

## 2025-07-22 PROCEDURE — 3074F SYST BP LT 130 MM HG: CPT | Performed by: INTERNAL MEDICINE

## 2025-07-22 PROCEDURE — 3078F DIAST BP <80 MM HG: CPT | Performed by: INTERNAL MEDICINE

## 2025-07-22 PROCEDURE — 99214 OFFICE O/P EST MOD 30 MIN: CPT | Performed by: INTERNAL MEDICINE

## 2025-07-22 PROCEDURE — 93000 ELECTROCARDIOGRAM COMPLETE: CPT | Performed by: INTERNAL MEDICINE

## 2025-07-22 RX ORDER — ROSUVASTATIN CALCIUM 40 MG/1
40 TABLET, COATED ORAL NIGHTLY
Qty: 90 TABLET | Refills: 2 | Status: SHIPPED | OUTPATIENT
Start: 2025-07-22

## 2025-07-22 RX ORDER — CILOSTAZOL 100 MG/1
100 TABLET ORAL 2 TIMES DAILY
Qty: 60 TABLET | Refills: 3 | Status: SHIPPED | OUTPATIENT
Start: 2025-07-22

## 2025-07-22 RX ORDER — METOPROLOL TARTRATE 25 MG/1
25 TABLET, FILM COATED ORAL 2 TIMES DAILY
Qty: 60 TABLET | Refills: 3 | Status: SHIPPED | OUTPATIENT
Start: 2025-07-22

## 2025-07-22 RX ORDER — NITROGLYCERIN 0.4 MG/1
TABLET SUBLINGUAL
Qty: 25 TABLET | Refills: 3 | Status: SHIPPED | OUTPATIENT
Start: 2025-07-22

## 2025-07-22 NOTE — LETTER
July 24, 2025     Lencho Gutierrez MD  [address]   [place] [address]    Patient: Sirisha Valdez   YOB: 1945   Date of Visit: 7/22/2025     Dear Lencho Gutierrez MD:       Thank you for referring Sirisha Valdez to me for evaluation. Below are the relevant portions of my assessment and plan of care.    If you have questions, please do not hesitate to call me. I look forward to following Sirisha along with you.         Sincerely,        Salvador Mccarthy MD        CC: No Recipients    Salvador Mccarthy MD  07/24/25 1826  Sign when Signing Visit  Lencho Gutierrez MD  Sirisha Valdez  1945  07/22/2025    Patient Active Problem List   Diagnosis   •  (ASCVD), s/p stenting in 1999, after an MI, repeat stenting in 2005 of the LAD and RCA at [facility] and stenting of the first OM branch of left circumflex on 6/26/2019.Clinically stable.   • SSS (sick sinus syndrome), s/p permanent pacemaker implantion,    • Palpitations   • Dizziness   • Dyslipidemia, currently on rosuvastatin 40 mg daily   • Essential hypertension, blood pressure is running low associated with fatigue.   • PVD (peripheral vascular disease)   • Swelling of both lower extremities   • Chronic left shoulder pain   • S/P arthroscopy of shoulder   • Coagulopathy   • Bilateral malignant neoplasm of breast in female, estrogen receptor positive   • Tobacco abuse   • Aromatase inhibitor use   • Osteopenia   • Abnormal EKG   • Abnormal nuclear stress test   • Collagenous colitis   • Pacemaker battery depletion   • Stage 3b chronic kidney disease   • Infrarenal abdominal aortic aneurysm (AAA) without rupture         Subjective     Sirisha Valdez is a 79 y.o. female with the problems as listed above, presents    Chief complaint: Follow-up of peripheral arterial disease    History of Present Illness:Ms. Monica Valdez is a pleasant 79-year-old  female with history of known CAD, s/p previous stenting of the LAD and RCA in 1999 and in 2005  respectively, history of sick sinus syndrome, s/p permanent pacemaker implantation 2011 with battery replacement in 2020 with an Abbott device. She also has peripheral arterial disease with previous interventions in both the right and left iliofemoral systems (details unavailable), presents today as a regular follow-up.  She was noted to have significant occlusive disease on previous evaluation with CT scan of the abdomen and pelvis on 3/24/2025 which revealed occluded right external iliac artery,occluded femoral-femoral bypass   Graft,occluded visualized right superficial femoral artery and reconstitution of the flow with right common femoral artery and profunda femoral artery.  There was patent stent in the left common iliac artery.  She was recently seen by cardiothoracic surgery service and was planned to have CT angiogram of the abdominal aorta with runoff into the lower extremities.  She has not had this done yet due to concerns for worsening of kidney function given her chronic kidney disease.  She still has claudication on walking short distance.  She has no long history of smoking and continues to smoke about a pack a day although she says she has cut down significantly from before.    Allergies   Allergen Reactions   • Promethazine      hypotension   • Dicyclomine Other (See Comments)     Pt couldn't walk, had stroke like symptoms. Per ER it was the Bentyl     Current Outpatient Medications:   •  cilostazol (PLETAL) 100 MG tablet, Take 1 tablet by mouth 2 (Two) Times a Day., Disp: 60 tablet, Rfl: 3  •  metoprolol tartrate (LOPRESSOR) 25 MG tablet, Take 1 tablet by mouth 2 (Two) Times a Day., Disp: 60 tablet, Rfl: 3  •  rosuvastatin (CRESTOR) 40 MG tablet, Take 1 tablet by mouth Every Night., Disp: 90 tablet, Rfl: 2  •  aspirin 81 MG EC tablet, Take 1 tablet by mouth Daily., Disp: 30 tablet, Rfl: 5  •  busPIRone (BUSPAR) 30 MG tablet, Take 1 tablet by mouth 4 (Four) Times a Day., Disp: , Rfl:   •   clotrimazole (LOTRIMIN) 1 % cream, Apply 1 Application topically to the appropriate area as directed 2 (Two) Times a Day. (Patient not taking: Reported on 6/2/2025), Disp: , Rfl:   •  diazePAM (VALIUM) 10 MG tablet, Take 1 tablet by mouth Every 12 (Twelve) Hours As Needed., Disp: , Rfl:   •  empagliflozin (Jardiance) 10 MG tablet tablet, Take 1 tablet by mouth Daily. (Patient not taking: Reported on 6/2/2025), Disp: , Rfl:   •  HYDROcodone-acetaminophen (NORCO) 7.5-325 MG per tablet, Take 1 tablet by mouth Every 6 (Six) Hours As Needed for Moderate Pain. (Patient not taking: Reported on 6/2/2025), Disp: , Rfl:   •  levothyroxine (SYNTHROID, LEVOTHROID) 25 MCG tablet, Take 1 tablet by mouth Daily., Disp: , Rfl:   •  lisinopril (PRINIVIL,ZESTRIL) 5 MG tablet, Take 1 tablet by mouth Daily., Disp: , Rfl:   •  Multi-Vitamin tablet tablet, Take 1 tablet by mouth Daily., Disp: , Rfl:   •  mupirocin (BACTROBAN) 2 % ointment, Apply  topically to the appropriate area as directed 3 (Three) Times a Day. (Patient not taking: Reported on 6/2/2025), Disp: , Rfl:   •  nicotine (Nicoderm CQ) 21 MG/24HR patch, Place 1 patch on the skin as directed by provider Daily., Disp: 30 patch, Rfl: 1  •  nitroglycerin (NITROSTAT) 0.4 MG SL tablet, 1 under the tongue as needed for angina, may repeat q5mins for up three doses, Disp: 25 tablet, Rfl: 3  •  pantoprazole (PROTONIX) 40 MG EC tablet, Take 1 tablet by mouth Daily., Disp: , Rfl:   •  potassium chloride 10 MEQ CR tablet, Take 1 tablet by mouth Daily., Disp: 30 tablet, Rfl: 3    The following portions of the patient's history were reviewed and updated as appropriate: allergies, current medications, past family history, past medical history, past social history, past surgical history and problem list.    Social History     Tobacco Use   • Smoking status: Every Day     Current packs/day: 1.00     Average packs/day: 1 pack/day for 64.6 years (64.6 ttl pk-yrs)     Types: Cigarettes     Start  "date: 1961   • Smokeless tobacco: Never   • Tobacco comments:     Pt is down to around 1/4 PPD and is trying to quit 6/2025   Vaping Use   • Vaping status: Never Used   Substance Use Topics   • Alcohol use: No   • Drug use: No     Review of Systems   Constitutional: Negative for chills and fever.   HENT:  Negative for nosebleeds and sore throat.    Cardiovascular:  Positive for claudication.   Respiratory:  Negative for cough, hemoptysis and wheezing.    Gastrointestinal:  Negative for abdominal pain, hematemesis, hematochezia, melena, nausea and vomiting.   Genitourinary:  Negative for dysuria and hematuria.   Neurological:  Negative for headaches.     Objective   Vitals:    07/22/25 1055   BP: 123/65   BP Location: Left arm   Patient Position: Sitting   Cuff Size: Adult   Pulse: 70   SpO2: 96%   Weight: 45.4 kg (100 lb)   Height: 157.5 cm (62\")     Body mass index is 18.29 kg/m².    Constitutional:       Appearance: Well-developed.   Eyes:      Conjunctiva/sclera: Conjunctivae normal.   HENT:      Head: Normocephalic.   Neck:      Thyroid: No thyromegaly.      Vascular: No JVD.      Trachea: No tracheal deviation.   Pulmonary:      Effort: No respiratory distress.      Breath sounds: Normal breath sounds. No wheezing. No rales.   Cardiovascular:      PMI at left midclavicular line. Normal rate. Regular rhythm. Normal S1. Normal S2.       Murmurs: There is no murmur.      No gallop.  No click. No rub.   Pulses:     Carotid: 2+ bilaterally.     Radial: 2+ bilaterally.     Dorsalis pedis: 1+ bilaterally.     Posterior tibial: 1+ bilaterally.  Edema:     Peripheral edema absent.   Abdominal:      General: Bowel sounds are normal.      Palpations: Abdomen is soft. There is no abdominal mass.      Tenderness: There is no abdominal tenderness.   Musculoskeletal:      Cervical back: Normal range of motion and neck supple. Skin:     General: Skin is warm and dry.   Neurological:      Mental Status: Alert and oriented to " person, place, and time.      Cranial Nerves: No cranial nerve deficit.         ECG 12 Lead    Date/Time: 7/22/2025 10:55 AM  Performed by: Salvador Mccarthy MD    Authorized by: Salvador Mccarthy MD  Comparison: compared with previous ECG from 12/1/2024  Similar to previous ECG  Rhythm: sinus rhythm  Conduction: conduction normal  Other findings: non-specific ST-T wave changes  Comments: Appropriate AV sequential pacing with magnet application            Assessment & Plan    Diagnosis Plan   1.  (ASCVD), s/p stenting in 1999, after an MI, repeat stenting in 2005 of the LAD and RCA at [facility] and stenting of the first OM branch of left circumflex on 6/26/2019.Clinically stable.        2. PAD, with occluded right EIA,occluded fem-fem bypass  graft,occluded visualized right SFA and reconstitution of the flow with right CFA and PFA. patent stent in the left CFA with leg claudication.        3. SSS (sick sinus syndrome), s/p permanent pacemaker implantation in 2011 with battery replacement in December 2020 with Abbotts device.        4. Tobacco abuse        5. Dyslipidemia        6. Stage 3b chronic kidney disease  Ambulatory Referral to Nephrology        Recommendations  Continue with current medications  Will refer to nephrologist to help with coordinate further evaluation for PAD with CT angiogram  Will continue to emphasize on smoking cessation.  We have discussed this at different times and the patient understands the risks of continued smoking with worsening of PAD, CAD and lung cancer etc.    Return in about 3 months (around 10/22/2025) for or sooner if needed.      With Best Regards,        Salvador Mccarthy MD, [facility]    Please note that portions of this note were completed with a voice recognition program.

## 2025-07-22 NOTE — PROGRESS NOTES
Lencho uGtierrez MD  Sirisha Valdez  1945  07/22/2025    Patient Active Problem List   Diagnosis     (ASCVD), s/p stenting in 1999, after an MI, repeat stenting in 2005 of the LAD and RCA at [facility] and stenting of the first OM branch of left circumflex on 6/26/2019.Clinically stable.    SSS (sick sinus syndrome), s/p permanent pacemaker implantion,     Palpitations    Dizziness    Dyslipidemia, currently on rosuvastatin 40 mg daily    Essential hypertension, blood pressure is running low associated with fatigue.    PVD (peripheral vascular disease)    Swelling of both lower extremities    Chronic left shoulder pain    S/P arthroscopy of shoulder    Coagulopathy    Bilateral malignant neoplasm of breast in female, estrogen receptor positive    Tobacco abuse    Aromatase inhibitor use    Osteopenia    Abnormal EKG    Abnormal nuclear stress test    Collagenous colitis    Pacemaker battery depletion    Stage 3b chronic kidney disease    Infrarenal abdominal aortic aneurysm (AAA) without rupture         Subjective     Sirisha Valdez is a 79 y.o. female with the problems as listed above, presents    Chief complaint: Follow-up of peripheral arterial disease    History of Present Illness:Ms. Monica Valdez is a pleasant 79-year-old  female with history of known CAD, s/p previous stenting of the LAD and RCA in 1999 and in 2005 respectively, history of sick sinus syndrome, s/p permanent pacemaker implantation 2011 with battery replacement in 2020 with an Abbott device. She also has peripheral arterial disease with previous interventions in both the right and left iliofemoral systems (details unavailable), presents today as a regular follow-up.  She was noted to have significant occlusive disease on previous evaluation with CT scan of the abdomen and pelvis on 3/24/2025 which revealed occluded right external iliac artery,occluded femoral-femoral bypass   Graft,occluded visualized right superficial  femoral artery and reconstitution of the flow with right common femoral artery and profunda femoral artery.  There was patent stent in the left common iliac artery.  She was recently seen by cardiothoracic surgery service and was planned to have CT angiogram of the abdominal aorta with runoff into the lower extremities.  She has not had this done yet due to concerns for worsening of kidney function given her chronic kidney disease.  She still has claudication on walking short distance.  She has no long history of smoking and continues to smoke about a pack a day although she says she has cut down significantly from before.    Allergies   Allergen Reactions    Promethazine      hypotension    Dicyclomine Other (See Comments)     Pt couldn't walk, had stroke like symptoms. Per ER it was the Bentyl     Current Outpatient Medications:     cilostazol (PLETAL) 100 MG tablet, Take 1 tablet by mouth 2 (Two) Times a Day., Disp: 60 tablet, Rfl: 3    metoprolol tartrate (LOPRESSOR) 25 MG tablet, Take 1 tablet by mouth 2 (Two) Times a Day., Disp: 60 tablet, Rfl: 3    rosuvastatin (CRESTOR) 40 MG tablet, Take 1 tablet by mouth Every Night., Disp: 90 tablet, Rfl: 2    aspirin 81 MG EC tablet, Take 1 tablet by mouth Daily., Disp: 30 tablet, Rfl: 5    busPIRone (BUSPAR) 30 MG tablet, Take 1 tablet by mouth 4 (Four) Times a Day., Disp: , Rfl:     clotrimazole (LOTRIMIN) 1 % cream, Apply 1 Application topically to the appropriate area as directed 2 (Two) Times a Day. (Patient not taking: Reported on 6/2/2025), Disp: , Rfl:     diazePAM (VALIUM) 10 MG tablet, Take 1 tablet by mouth Every 12 (Twelve) Hours As Needed., Disp: , Rfl:     empagliflozin (Jardiance) 10 MG tablet tablet, Take 1 tablet by mouth Daily. (Patient not taking: Reported on 6/2/2025), Disp: , Rfl:     HYDROcodone-acetaminophen (NORCO) 7.5-325 MG per tablet, Take 1 tablet by mouth Every 6 (Six) Hours As Needed for Moderate Pain. (Patient not taking: Reported on  6/2/2025), Disp: , Rfl:     levothyroxine (SYNTHROID, LEVOTHROID) 25 MCG tablet, Take 1 tablet by mouth Daily., Disp: , Rfl:     lisinopril (PRINIVIL,ZESTRIL) 5 MG tablet, Take 1 tablet by mouth Daily., Disp: , Rfl:     Multi-Vitamin tablet tablet, Take 1 tablet by mouth Daily., Disp: , Rfl:     mupirocin (BACTROBAN) 2 % ointment, Apply  topically to the appropriate area as directed 3 (Three) Times a Day. (Patient not taking: Reported on 6/2/2025), Disp: , Rfl:     nicotine (Nicoderm CQ) 21 MG/24HR patch, Place 1 patch on the skin as directed by provider Daily., Disp: 30 patch, Rfl: 1    nitroglycerin (NITROSTAT) 0.4 MG SL tablet, 1 under the tongue as needed for angina, may repeat q5mins for up three doses, Disp: 25 tablet, Rfl: 3    pantoprazole (PROTONIX) 40 MG EC tablet, Take 1 tablet by mouth Daily., Disp: , Rfl:     potassium chloride 10 MEQ CR tablet, Take 1 tablet by mouth Daily., Disp: 30 tablet, Rfl: 3    The following portions of the patient's history were reviewed and updated as appropriate: allergies, current medications, past family history, past medical history, past social history, past surgical history and problem list.    Social History     Tobacco Use    Smoking status: Every Day     Current packs/day: 1.00     Average packs/day: 1 pack/day for 64.6 years (64.6 ttl pk-yrs)     Types: Cigarettes     Start date: 1961    Smokeless tobacco: Never    Tobacco comments:     Pt is down to around 1/4 PPD and is trying to quit 6/2025   Vaping Use    Vaping status: Never Used   Substance Use Topics    Alcohol use: No    Drug use: No     Review of Systems   Constitutional: Negative for chills and fever.   HENT:  Negative for nosebleeds and sore throat.    Cardiovascular:  Positive for claudication.   Respiratory:  Negative for cough, hemoptysis and wheezing.    Gastrointestinal:  Negative for abdominal pain, hematemesis, hematochezia, melena, nausea and vomiting.   Genitourinary:  Negative for dysuria and  "hematuria.   Neurological:  Negative for headaches.     Objective   Vitals:    07/22/25 1055   BP: 123/65   BP Location: Left arm   Patient Position: Sitting   Cuff Size: Adult   Pulse: 70   SpO2: 96%   Weight: 45.4 kg (100 lb)   Height: 157.5 cm (62\")     Body mass index is 18.29 kg/m².    Constitutional:       Appearance: Well-developed.   Eyes:      Conjunctiva/sclera: Conjunctivae normal.   HENT:      Head: Normocephalic.   Neck:      Thyroid: No thyromegaly.      Vascular: No JVD.      Trachea: No tracheal deviation.   Pulmonary:      Effort: No respiratory distress.      Breath sounds: Normal breath sounds. No wheezing. No rales.   Cardiovascular:      PMI at left midclavicular line. Normal rate. Regular rhythm. Normal S1. Normal S2.       Murmurs: There is no murmur.      No gallop.  No click. No rub.   Pulses:     Carotid: 2+ bilaterally.     Radial: 2+ bilaterally.     Dorsalis pedis: 1+ bilaterally.     Posterior tibial: 1+ bilaterally.  Edema:     Peripheral edema absent.   Abdominal:      General: Bowel sounds are normal.      Palpations: Abdomen is soft. There is no abdominal mass.      Tenderness: There is no abdominal tenderness.   Musculoskeletal:      Cervical back: Normal range of motion and neck supple. Skin:     General: Skin is warm and dry.   Neurological:      Mental Status: Alert and oriented to person, place, and time.      Cranial Nerves: No cranial nerve deficit.         ECG 12 Lead    Date/Time: 7/22/2025 10:55 AM  Performed by: Salvador Mccarthy MD    Authorized by: Salvador Mccarthy MD  Comparison: compared with previous ECG from 12/1/2024  Similar to previous ECG  Rhythm: sinus rhythm  Conduction: conduction normal  Other findings: non-specific ST-T wave changes  Comments: Appropriate AV sequential pacing with magnet application            Assessment & Plan    Diagnosis Plan   1.  (ASCVD), s/p stenting in 1999, after an MI, repeat stenting in 2005 of the LAD and RCA at [facility] " and stenting of the first OM branch of left circumflex on 6/26/2019.Clinically stable.        2. PAD, with occluded right EIA,occluded fem-fem bypass  graft,occluded visualized right SFA and reconstitution of the flow with right CFA and PFA. patent stent in the left CFA with leg claudication.        3. SSS (sick sinus syndrome), s/p permanent pacemaker implantation in 2011 with battery replacement in December 2020 with Abbotts device.        4. Tobacco abuse        5. Dyslipidemia        6. Stage 3b chronic kidney disease  Ambulatory Referral to Nephrology        Recommendations  Continue with current medications  Will refer to nephrologist to help with coordinate further evaluation for PAD with CT angiogram  Will continue to emphasize on smoking cessation.  We have discussed this at different times and the patient understands the risks of continued smoking with worsening of PAD, CAD and lung cancer etc.    Return in about 3 months (around 10/22/2025) for or sooner if needed.      With Best Regards,        Salvador Mccarthy MD, [facility]    Please note that portions of this note were completed with a voice recognition program.

## 2025-08-13 ENCOUNTER — HOSPITAL ENCOUNTER (OUTPATIENT)
Dept: CT IMAGING | Facility: HOSPITAL | Age: 80
Discharge: HOME | End: 2025-08-13
Payer: MEDICARE

## 2025-08-13 DIAGNOSIS — I70.232 ATHEROSCLEROSIS OF NATIVE ARTERIES OF RIGHT LEG WITH ULCERATION OF CALF: ICD-10-CM

## 2025-08-13 DIAGNOSIS — I73.9 PVD (PERIPHERAL VASCULAR DISEASE): ICD-10-CM

## 2025-08-18 ENCOUNTER — TELEPHONE (OUTPATIENT)
Dept: CARDIAC SURGERY | Facility: CLINIC | Age: 80
End: 2025-08-18
Payer: MEDICARE

## 2025-08-19 ENCOUNTER — TELEPHONE (OUTPATIENT)
Dept: CARDIAC SURGERY | Facility: CLINIC | Age: 80
End: 2025-08-19
Payer: MEDICARE

## (undated) DEVICE — COR PACEMAKER: Brand: MEDLINE INDUSTRIES, INC.

## (undated) DEVICE — DRAPE, RADIAL, STERILE: Brand: MEDLINE

## (undated) DEVICE — GLV SURG TRIUMPH ORTHO W/ALOE PF LTX 8.5 STRL

## (undated) DEVICE — MAT FLR ABSORBENT LG 4FT 10 2.5FT

## (undated) DEVICE — SPNG GZ WOVN 4X4IN 12PLY 10/BX STRL

## (undated) DEVICE — SYR LUERLOK 50ML

## (undated) DEVICE — SUT SILK 2/0 TIES 30IN SA85H

## (undated) DEVICE — SUT VIC 3/0 SH 27IN J416H

## (undated) DEVICE — ENCORE® LATEX MICRO SIZE 7, STERILE LATEX POWDER-FREE SURGICAL GLOVE: Brand: ENCORE

## (undated) DEVICE — ADULT DISPOSABLE SINGLE-PATIENT USE PULSE OXIMETER SENSOR: Brand: NONIN

## (undated) DEVICE — SPNG LAP PREWSH SFTPK 18X18IN STRL PK/5

## (undated) DEVICE — SUT SILK 2/0 SH 30IN K833H

## (undated) DEVICE — ELECTRD BLD EDGE/INSUL1P SFTY SLV 2.75IN

## (undated) DEVICE — Device: Brand: MEDEX

## (undated) DEVICE — STCKNT IMPERV 9X36IN STRL

## (undated) DEVICE — ST EXT IV SMARTSITE 2VLV SP M LL 5ML IV1

## (undated) DEVICE — RUNWAY RADL W/TOP PAD

## (undated) DEVICE — NDL HYPO ECLPS SFTY 22G 1 1/2IN

## (undated) DEVICE — WRP COMPR SHLDR COLD UNIV

## (undated) DEVICE — UNDYED BRAIDED (POLYGLACTIN 910), SYNTHETIC ABSORBABLE SUTURE: Brand: COATED VICRYL

## (undated) DEVICE — CVR PROB 96IN LF STRL

## (undated) DEVICE — INTENDED FOR TISSUE SEPARATION, AND OTHER PROCEDURES THAT REQUIRE A SHARP SURGICAL BLADE TO PUNCTURE OR CUT.: Brand: BARD-PARKER ® STAINLESS STEEL BLADES

## (undated) DEVICE — SHEET,DRAPE,53X77,STERILE: Brand: MEDLINE

## (undated) DEVICE — TR BAND RADIAL ARTERY COMPRESSION DEVICE: Brand: TR BAND

## (undated) DEVICE — TREK CORONARY DILATATION CATHETER 2.50 MM X 20 MM / RAPID-EXCHANGE: Brand: TREK

## (undated) DEVICE — 6F .070 JL3.5 100CM: Brand: CORDIS

## (undated) DEVICE — GLIDESHEATH SLENDER STAINLESS STEEL KIT: Brand: GLIDESHEATH SLENDER

## (undated) DEVICE — HOLDER: Brand: DEROYAL

## (undated) DEVICE — 4-PORT MANIFOLD: Brand: NEPTUNE 2

## (undated) DEVICE — DRSNG SURESITE WNDW 4X4.5

## (undated) DEVICE — PAD GRND REM POLYHESIVE A/ DISP

## (undated) DEVICE — PK BASIC 70

## (undated) DEVICE — KT CVR ULTRASND PROB PULL UP LXF 5X8

## (undated) DEVICE — PLASMABLADE PS200-040 4.0: Brand: PLASMABLADE™

## (undated) DEVICE — NDL SPINE 17G 3 1/2IN TUOHY

## (undated) DEVICE — HI-TORQUE WHISPER MS GUIDE WIRE .014 STRAIGHT TIP 3.0 CM X 190 CM: Brand: HI-TORQUE WHISPER

## (undated) DEVICE — DRAPE,EXTREMITY,89X128,STERILE: Brand: MEDLINE

## (undated) DEVICE — KT INK TISS MARGINMARKER STD 6COLOR

## (undated) DEVICE — SYR LUERLOK 30CC

## (undated) DEVICE — 3M™ COBAN™ STERILE SELF-ADHERENT WRAP, 1584S, 4 IN X 5 YD (10 CM X 4,5 M), 18 ROLLS/CASE: Brand: 3M™ COBAN™

## (undated) DEVICE — DEV TRNSPEC

## (undated) DEVICE — VIOLET BRAIDED (POLYGLACTIN 910), SYNTHETIC ABSORBABLE SUTURE: Brand: COATED VICRYL

## (undated) DEVICE — SKIN PREP TRAY W/CHG: Brand: MEDLINE INDUSTRIES, INC.

## (undated) DEVICE — TBG PUMP ARTHSCP MAIN AR6400 16FT

## (undated) DEVICE — 6F .070 XB 3.5 100CM: Brand: VISTA BRITE TIP

## (undated) DEVICE — DRAPE,T,LAPARO,TRANS,STERILE: Brand: MEDLINE

## (undated) DEVICE — PK SHLDR 70

## (undated) DEVICE — 3M™ STERI-STRIP™ REINFORCED ADHESIVE SKIN CLOSURES, R1547, 1/2 IN X 4 IN (12 MM X 100 MM), 6 STRIPS/ENVELOPE: Brand: 3M™ STERI-STRIP™

## (undated) DEVICE — ADULT, RADIOTRANSPARENT ELEMENT, COMPATIBLE W/ ZOLL: Brand: DEFIBRILLATION ELECTRODES

## (undated) DEVICE — [6-FLUTE BUR, ARTHROSCOPIC SHAVER BLADE,  DO NOT RESTERILIZE,  DO NOT USE IF PACKAGE IS DAMAGED,  KEEP DRY,  KEEP AWAY FROM SUNLIGHT]: Brand: FORMULA, SLAP

## (undated) DEVICE — CONTAINER,SPECIMEN,OR STERILE,4OZ: Brand: MEDLINE

## (undated) DEVICE — DEV INFL MONARCH 25W

## (undated) DEVICE — SHOULDER IMMOBILIZER: Brand: DEROYAL

## (undated) DEVICE — RADIFOCUS OPTITORQUE ANGIOGRAPHIC CATHETER: Brand: OPTITORQUE

## (undated) DEVICE — [TOMCAT CUTTER, ARTHROSCOPIC SHAVER BLADE,  DO NOT RESTERILIZE,  DO NOT USE IF PACKAGE IS DAMAGED,  KEEP DRY,  KEEP AWAY FROM SUNLIGHT]: Brand: FORMULA

## (undated) DEVICE — GW INQW FIX/CORE PTFE J/3MM .035 260CM

## (undated) DEVICE — ST INF PRI SMRTSTE 20DRP 2VLV 24ML 117

## (undated) DEVICE — CANNULA,OXY,ADULT,SUPER SOFT,W/14'TUB,UC: Brand: MEDLINE INDUSTRIES, INC.

## (undated) DEVICE — PK CATH CARD 70

## (undated) DEVICE — SUT ETHLN 3/0 PS2 18 IN 1669H

## (undated) DEVICE — COPILOT BLEEDBACK CONTROL VALVE: Brand: COPILOT

## (undated) DEVICE — APPL CHLORAPREP W/TINT 26ML ORNG

## (undated) DEVICE — DRSNG PAD ABD 8X10IN STRL

## (undated) DEVICE — AMD ANTIMICROBIAL NON-ADHERENT ISLAND DRESSING,0.2% POLYHEXAMETHYLENE BIGUANIDE HCI (PHMB): Brand: TELFA

## (undated) DEVICE — ROSEBUD DISSECTORS: Brand: DEROYAL

## (undated) DEVICE — SUT ETHLN 3/0 FS1 663G

## (undated) DEVICE — TP NDL SCORPION

## (undated) DEVICE — Device

## (undated) DEVICE — LN INJ CONTRST FLXCIL HP F/M LL 1200PSI10

## (undated) DEVICE — CATH F5 INF PIG145 110CM 6SH: Brand: INFINITI